# Patient Record
Sex: MALE | Race: WHITE | Employment: OTHER | URBAN - METROPOLITAN AREA
[De-identification: names, ages, dates, MRNs, and addresses within clinical notes are randomized per-mention and may not be internally consistent; named-entity substitution may affect disease eponyms.]

---

## 2018-04-07 ENCOUNTER — APPOINTMENT (OUTPATIENT)
Dept: GENERAL RADIOLOGY | Age: 53
DRG: 003 | End: 2018-04-07
Payer: MEDICARE

## 2018-04-07 ENCOUNTER — APPOINTMENT (OUTPATIENT)
Dept: CT IMAGING | Age: 53
DRG: 003 | End: 2018-04-07
Payer: MEDICARE

## 2018-04-07 ENCOUNTER — ANESTHESIA (OUTPATIENT)
Dept: OPERATING ROOM | Age: 53
DRG: 003 | End: 2018-04-07
Payer: MEDICARE

## 2018-04-07 ENCOUNTER — ANESTHESIA EVENT (OUTPATIENT)
Dept: OPERATING ROOM | Age: 53
DRG: 003 | End: 2018-04-07
Payer: MEDICARE

## 2018-04-07 ENCOUNTER — HOSPITAL ENCOUNTER (INPATIENT)
Age: 53
LOS: 20 days | Discharge: NURSING, REHAB OR LONG-TERM CARE | DRG: 003 | End: 2018-04-27
Attending: EMERGENCY MEDICINE | Admitting: SURGERY
Payer: MEDICARE

## 2018-04-07 VITALS
OXYGEN SATURATION: 93 % | SYSTOLIC BLOOD PRESSURE: 95 MMHG | DIASTOLIC BLOOD PRESSURE: 56 MMHG | RESPIRATION RATE: 8 BRPM | TEMPERATURE: 97 F

## 2018-04-07 DIAGNOSIS — R52 PAIN: ICD-10-CM
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
## 2018-04-07 DIAGNOSIS — F10.921 ALCOHOL INTOXICATION WITH DELIRIUM (HCC): ICD-10-CM

## 2018-04-07 DIAGNOSIS — S82.102A CLOSED FRACTURE OF PROXIMAL END OF LEFT TIBIA, UNSPECIFIED FRACTURE MORPHOLOGY, INITIAL ENCOUNTER: ICD-10-CM

## 2018-04-07 DIAGNOSIS — T14.8XXA FRACTURE: ICD-10-CM

## 2018-04-07 DIAGNOSIS — J96.00 ACUTE RESPIRATORY FAILURE, UNSPECIFIED WHETHER WITH HYPOXIA OR HYPERCAPNIA (HCC): ICD-10-CM

## 2018-04-07 DIAGNOSIS — T14.90XA TRAUMA: Primary | ICD-10-CM

## 2018-04-07 LAB
% INHIBITION AA: 95.4 %
% INHIBITION ADP: 64.6 %
ABO/RH: NORMAL
ACETAMINOPHEN LEVEL: <15 MCG/ML (ref 10–30)
ALBUMIN SERPL-MCNC: 4.2 G/DL (ref 3.5–5.2)
ALP BLD-CCNC: 89 U/L (ref 40–129)
ALT SERPL-CCNC: 61 U/L (ref 0–40)
ANGLE (CLOT STRENGTH): 68.7 DEGREE (ref 59–74)
ANION GAP SERPL CALCULATED.3IONS-SCNC: 14 MMOL/L (ref 7–16)
ANTIBODY SCREEN: NORMAL
APTT: 24.3 SEC (ref 24.5–35.1)
AST SERPL-CCNC: 43 U/L (ref 0–39)
B.E.: -3.2 MMOL/L (ref -3–3)
BILIRUB SERPL-MCNC: 0.2 MG/DL (ref 0–1.2)
BUN BLDV-MCNC: 11 MG/DL (ref 6–20)
CALCIUM SERPL-MCNC: 9 MG/DL (ref 8.6–10.2)
CHLORIDE BLD-SCNC: 100 MMOL/L (ref 98–107)
CO2: 24 MMOL/L (ref 22–29)
COHB: 8.1 % (ref 0–1.5)
CREAT SERPL-MCNC: 0.9 MG/DL (ref 0.7–1.2)
CRITICAL: ABNORMAL
DATE ANALYZED: ABNORMAL
DATE OF COLLECTION: ABNORMAL
EPL-TEG: 15.4 % (ref 0–15)
ETHANOL: 261 MG/DL (ref 0–0.08)
G-TEG: 5.9 K D/SC (ref 4.5–11)
GFR AFRICAN AMERICAN: >60
GFR NON-AFRICAN AMERICAN: >60 ML/MIN/1.73
GLUCOSE BLD-MCNC: 109 MG/DL (ref 74–109)
HCO3: 21.5 MMOL/L (ref 22–26)
HCT VFR BLD CALC: 39.7 % (ref 37–54)
HEMOGLOBIN: 13.3 G/DL (ref 12.5–16.5)
HHB: 0.6 % (ref 0–5)
INR BLD: 1.1
K (CLOTTING TIME): 1.6 MIN (ref 1–3)
LAB: ABNORMAL
LACTIC ACID: 1.9 MMOL/L (ref 0.5–2.2)
LY30 (FIBRINOLYSIS): 15.4 % (ref 0–8)
Lab: 1910
MA (MAX AMPLITUDE): 54.3 MM (ref 50–70)
MA-AA: 8.8 MM
MA-ACTIVATED: 6.6 MM
MA-ADP: 23.5 MM
MA-TEG BASELINE: 54.3 MM
MCH RBC QN AUTO: 30.9 PG (ref 26–35)
MCHC RBC AUTO-ENTMCNC: 33.5 % (ref 32–34.5)
MCV RBC AUTO: 92.3 FL (ref 80–99.9)
METHB: 0.3 % (ref 0–1.5)
MODE: ABNORMAL
O2 CONTENT: 19 ML/DL
O2 SATURATION: 99.3 % (ref 92–98.5)
O2HB: 91 % (ref 94–97)
OPERATOR ID: 5721
PATIENT TEMP: 37 C
PCO2: 37.4 MMHG (ref 35–45)
PDW BLD-RTO: 13.8 FL (ref 11.5–15)
PH BLOOD GAS: 7.38 (ref 7.35–7.45)
PLATELET # BLD: 275 E9/L (ref 130–450)
PMV BLD AUTO: 10 FL (ref 7–12)
PO2: 277.9 MMHG (ref 60–100)
POTASSIUM SERPL-SCNC: 4.12 MMOL/L (ref 3.3–5.1)
POTASSIUM SERPL-SCNC: 4.5 MMOL/L (ref 3.5–5)
PROTHROMBIN TIME: 11.4 SEC (ref 9.3–12.4)
R (REACTION TIME): 3.3 MIN (ref 5–10)
RBC # BLD: 4.3 E12/L (ref 3.8–5.8)
SALICYLATE, SERUM: 1.9 MG/DL (ref 0–30)
SODIUM BLD-SCNC: 138 MMOL/L (ref 132–146)
SOURCE, BLOOD GAS: ABNORMAL
THB: 14.3 G/DL (ref 11.5–16.5)
TIME ANALYZED: 1912
TOTAL PROTEIN: 6.7 G/DL (ref 6.4–8.3)
TRICYCLIC ANTIDEPRESSANTS SCREEN SERUM: NEGATIVE NG/ML
WBC # BLD: 10.8 E9/L (ref 4.5–11.5)

## 2018-04-07 PROCEDURE — 86850 RBC ANTIBODY SCREEN: CPT

## 2018-04-07 PROCEDURE — 73560 X-RAY EXAM OF KNEE 1 OR 2: CPT

## 2018-04-07 PROCEDURE — G0390 TRAUMA RESPONS W/HOSP CRITI: HCPCS

## 2018-04-07 PROCEDURE — 99222 1ST HOSP IP/OBS MODERATE 55: CPT | Performed by: ORTHOPAEDIC SURGERY

## 2018-04-07 PROCEDURE — 36415 COLL VENOUS BLD VENIPUNCTURE: CPT

## 2018-04-07 PROCEDURE — 77071 MNL APPL STRS JT RADIOGRAPHY: CPT | Performed by: ORTHOPAEDIC SURGERY

## 2018-04-07 PROCEDURE — 83605 ASSAY OF LACTIC ACID: CPT

## 2018-04-07 PROCEDURE — 85610 PROTHROMBIN TIME: CPT

## 2018-04-07 PROCEDURE — 86900 BLOOD TYPING SEROLOGIC ABO: CPT

## 2018-04-07 PROCEDURE — 3700000000 HC ANESTHESIA ATTENDED CARE: Performed by: ORTHOPAEDIC SURGERY

## 2018-04-07 PROCEDURE — 6360000002 HC RX W HCPCS: Performed by: EMERGENCY MEDICINE

## 2018-04-07 PROCEDURE — 0QSKXZZ REPOSITION LEFT FIBULA, EXTERNAL APPROACH: ICD-10-PCS | Performed by: ORTHOPAEDIC SURGERY

## 2018-04-07 PROCEDURE — 2580000003 HC RX 258: Performed by: STUDENT IN AN ORGANIZED HEALTH CARE EDUCATION/TRAINING PROGRAM

## 2018-04-07 PROCEDURE — 99285 EMERGENCY DEPT VISIT HI MDM: CPT

## 2018-04-07 PROCEDURE — 74177 CT ABD & PELVIS W/CONTRAST: CPT

## 2018-04-07 PROCEDURE — 80307 DRUG TEST PRSMV CHEM ANLYZR: CPT

## 2018-04-07 PROCEDURE — 6360000002 HC RX W HCPCS: Performed by: STUDENT IN AN ORGANIZED HEALTH CARE EDUCATION/TRAINING PROGRAM

## 2018-04-07 PROCEDURE — 71045 X-RAY EXAM CHEST 1 VIEW: CPT

## 2018-04-07 PROCEDURE — 3700000001 HC ADD 15 MINUTES (ANESTHESIA): Performed by: ORTHOPAEDIC SURGERY

## 2018-04-07 PROCEDURE — 73551 X-RAY EXAM OF FEMUR 1: CPT

## 2018-04-07 PROCEDURE — 90471 IMMUNIZATION ADMIN: CPT | Performed by: STUDENT IN AN ORGANIZED HEALTH CARE EDUCATION/TRAINING PROGRAM

## 2018-04-07 PROCEDURE — 7100000001 HC PACU RECOVERY - ADDTL 15 MIN: Performed by: ORTHOPAEDIC SURGERY

## 2018-04-07 PROCEDURE — 99223 1ST HOSP IP/OBS HIGH 75: CPT | Performed by: SURGERY

## 2018-04-07 PROCEDURE — 90715 TDAP VACCINE 7 YRS/> IM: CPT | Performed by: STUDENT IN AN ORGANIZED HEALTH CARE EDUCATION/TRAINING PROGRAM

## 2018-04-07 PROCEDURE — 96374 THER/PROPH/DIAG INJ IV PUSH: CPT

## 2018-04-07 PROCEDURE — 85384 FIBRINOGEN ACTIVITY: CPT

## 2018-04-07 PROCEDURE — 6360000002 HC RX W HCPCS

## 2018-04-07 PROCEDURE — 72125 CT NECK SPINE W/O DYE: CPT

## 2018-04-07 PROCEDURE — 2500000003 HC RX 250 WO HCPCS

## 2018-04-07 PROCEDURE — 3600000013 HC SURGERY LEVEL 3 ADDTL 15MIN: Performed by: ORTHOPAEDIC SURGERY

## 2018-04-07 PROCEDURE — 73000 X-RAY EXAM OF COLLAR BONE: CPT

## 2018-04-07 PROCEDURE — 7100000000 HC PACU RECOVERY - FIRST 15 MIN: Performed by: ORTHOPAEDIC SURGERY

## 2018-04-07 PROCEDURE — 2720000010 HC SURG SUPPLY STERILE: Performed by: ORTHOPAEDIC SURGERY

## 2018-04-07 PROCEDURE — 1200000000 HC SEMI PRIVATE

## 2018-04-07 PROCEDURE — 6360000002 HC RX W HCPCS: Performed by: ANESTHESIOLOGY

## 2018-04-07 PROCEDURE — 72170 X-RAY EXAM OF PELVIS: CPT

## 2018-04-07 PROCEDURE — 0QHH35Z INSERTION OF EXTERNAL FIXATION DEVICE INTO LEFT TIBIA, PERCUTANEOUS APPROACH: ICD-10-PCS | Performed by: ORTHOPAEDIC SURGERY

## 2018-04-07 PROCEDURE — 86901 BLOOD TYPING SEROLOGIC RH(D): CPT

## 2018-04-07 PROCEDURE — 70450 CT HEAD/BRAIN W/O DYE: CPT

## 2018-04-07 PROCEDURE — 85730 THROMBOPLASTIN TIME PARTIAL: CPT

## 2018-04-07 PROCEDURE — 36410 VNPNXR 3YR/> PHY/QHP DX/THER: CPT | Performed by: SURGERY

## 2018-04-07 PROCEDURE — 36600 WITHDRAWAL OF ARTERIAL BLOOD: CPT | Performed by: SURGERY

## 2018-04-07 PROCEDURE — 85576 BLOOD PLATELET AGGREGATION: CPT

## 2018-04-07 PROCEDURE — 76000 FLUOROSCOPY <1 HR PHYS/QHP: CPT

## 2018-04-07 PROCEDURE — 71260 CT THORAX DX C+: CPT

## 2018-04-07 PROCEDURE — 84132 ASSAY OF SERUM POTASSIUM: CPT

## 2018-04-07 PROCEDURE — 27780 TREATMENT OF FIBULA FRACTURE: CPT | Performed by: ORTHOPAEDIC SURGERY

## 2018-04-07 PROCEDURE — 2709999900 HC NON-CHARGEABLE SUPPLY: Performed by: ORTHOPAEDIC SURGERY

## 2018-04-07 PROCEDURE — 3600000003 HC SURGERY LEVEL 3 BASE: Performed by: ORTHOPAEDIC SURGERY

## 2018-04-07 PROCEDURE — 82805 BLOOD GASES W/O2 SATURATION: CPT

## 2018-04-07 PROCEDURE — 73590 X-RAY EXAM OF LOWER LEG: CPT

## 2018-04-07 PROCEDURE — 85027 COMPLETE CBC AUTOMATED: CPT

## 2018-04-07 PROCEDURE — 85347 COAGULATION TIME ACTIVATED: CPT

## 2018-04-07 PROCEDURE — C1713 ANCHOR/SCREW BN/BN,TIS/BN: HCPCS | Performed by: ORTHOPAEDIC SURGERY

## 2018-04-07 PROCEDURE — 0QSHXZZ REPOSITION LEFT TIBIA, EXTERNAL APPROACH: ICD-10-PCS | Performed by: ORTHOPAEDIC SURGERY

## 2018-04-07 PROCEDURE — 20690 APPL UNIPLN UNI EXT FIXJ SYS: CPT | Performed by: ORTHOPAEDIC SURGERY

## 2018-04-07 PROCEDURE — 94761 N-INVAS EAR/PLS OXIMETRY MLT: CPT

## 2018-04-07 PROCEDURE — 6360000004 HC RX CONTRAST MEDICATION: Performed by: RADIOLOGY

## 2018-04-07 PROCEDURE — 80053 COMPREHEN METABOLIC PANEL: CPT

## 2018-04-07 DEVICE — SCREW EXT FIX L175MM DIA5MM THRD L60MM S STL SELF DRL SCHNZ: Type: IMPLANTABLE DEVICE | Site: LEG | Status: FUNCTIONAL

## 2018-04-07 DEVICE — SCREW EXT FIX L150MM DIA5MM THRD L150MM S STL SELF DRL MR: Type: IMPLANTABLE DEVICE | Site: LEG | Status: FUNCTIONAL

## 2018-04-07 RX ORDER — GLYCOPYRROLATE 1 MG/5 ML
SYRINGE (ML) INTRAVENOUS PRN
Status: DISCONTINUED | OUTPATIENT
Start: 2018-04-07 | End: 2018-04-07 | Stop reason: SDUPTHER

## 2018-04-07 RX ORDER — ONDANSETRON 2 MG/ML
4 INJECTION INTRAMUSCULAR; INTRAVENOUS
Status: ACTIVE | OUTPATIENT
Start: 2018-04-07 | End: 2018-04-07

## 2018-04-07 RX ORDER — MIDAZOLAM HYDROCHLORIDE 1 MG/ML
INJECTION INTRAMUSCULAR; INTRAVENOUS
Status: COMPLETED
Start: 2018-04-07 | End: 2018-04-07

## 2018-04-07 RX ORDER — DEXAMETHASONE SODIUM PHOSPHATE 10 MG/ML
INJECTION, SOLUTION INTRAMUSCULAR; INTRAVENOUS PRN
Status: DISCONTINUED | OUTPATIENT
Start: 2018-04-07 | End: 2018-04-07 | Stop reason: SDUPTHER

## 2018-04-07 RX ORDER — FENTANYL CITRATE 50 UG/ML
25 INJECTION, SOLUTION INTRAMUSCULAR; INTRAVENOUS EVERY 5 MIN PRN
Status: DISCONTINUED | OUTPATIENT
Start: 2018-04-07 | End: 2018-04-08

## 2018-04-07 RX ORDER — SODIUM CHLORIDE 9 MG/ML
INJECTION, SOLUTION INTRAVENOUS CONTINUOUS
Status: DISCONTINUED | OUTPATIENT
Start: 2018-04-07 | End: 2018-04-08

## 2018-04-07 RX ORDER — FENTANYL CITRATE 50 UG/ML
INJECTION, SOLUTION INTRAMUSCULAR; INTRAVENOUS
Status: COMPLETED
Start: 2018-04-07 | End: 2018-04-07

## 2018-04-07 RX ORDER — DIPHENHYDRAMINE HYDROCHLORIDE 50 MG/ML
12.5 INJECTION INTRAMUSCULAR; INTRAVENOUS
Status: ACTIVE | OUTPATIENT
Start: 2018-04-07 | End: 2018-04-07

## 2018-04-07 RX ORDER — HYDROMORPHONE HCL 110MG/55ML
0.25 PATIENT CONTROLLED ANALGESIA SYRINGE INTRAVENOUS EVERY 5 MIN PRN
Status: DISCONTINUED | OUTPATIENT
Start: 2018-04-07 | End: 2018-04-08

## 2018-04-07 RX ORDER — MEPERIDINE HYDROCHLORIDE 50 MG/ML
12.5 INJECTION INTRAMUSCULAR; INTRAVENOUS; SUBCUTANEOUS EVERY 5 MIN PRN
Status: DISCONTINUED | OUTPATIENT
Start: 2018-04-07 | End: 2018-04-08

## 2018-04-07 RX ORDER — FENTANYL CITRATE 50 UG/ML
50 INJECTION, SOLUTION INTRAMUSCULAR; INTRAVENOUS EVERY 5 MIN PRN
Status: DISCONTINUED | OUTPATIENT
Start: 2018-04-07 | End: 2018-04-12

## 2018-04-07 RX ORDER — PROPOFOL 10 MG/ML
INJECTION, EMULSION INTRAVENOUS PRN
Status: DISCONTINUED | OUTPATIENT
Start: 2018-04-07 | End: 2018-04-07 | Stop reason: SDUPTHER

## 2018-04-07 RX ORDER — FENTANYL CITRATE 50 UG/ML
INJECTION, SOLUTION INTRAMUSCULAR; INTRAVENOUS PRN
Status: DISCONTINUED | OUTPATIENT
Start: 2018-04-07 | End: 2018-04-07 | Stop reason: SDUPTHER

## 2018-04-07 RX ORDER — LABETALOL HYDROCHLORIDE 5 MG/ML
5 INJECTION, SOLUTION INTRAVENOUS EVERY 10 MIN PRN
Status: DISCONTINUED | OUTPATIENT
Start: 2018-04-07 | End: 2018-04-08

## 2018-04-07 RX ORDER — SUCCINYLCHOLINE CHLORIDE 20 MG/ML
INJECTION INTRAMUSCULAR; INTRAVENOUS PRN
Status: DISCONTINUED | OUTPATIENT
Start: 2018-04-07 | End: 2018-04-07 | Stop reason: SDUPTHER

## 2018-04-07 RX ORDER — MORPHINE SULFATE 2 MG/ML
2 INJECTION, SOLUTION INTRAMUSCULAR; INTRAVENOUS
Status: DISCONTINUED | OUTPATIENT
Start: 2018-04-07 | End: 2018-04-08

## 2018-04-07 RX ORDER — ROCURONIUM BROMIDE 10 MG/ML
INJECTION, SOLUTION INTRAVENOUS PRN
Status: DISCONTINUED | OUTPATIENT
Start: 2018-04-07 | End: 2018-04-07 | Stop reason: SDUPTHER

## 2018-04-07 RX ORDER — MIDAZOLAM HYDROCHLORIDE 1 MG/ML
INJECTION INTRAMUSCULAR; INTRAVENOUS PRN
Status: DISCONTINUED | OUTPATIENT
Start: 2018-04-07 | End: 2018-04-07 | Stop reason: SDUPTHER

## 2018-04-07 RX ORDER — OXYCODONE HYDROCHLORIDE 5 MG/1
5 TABLET ORAL EVERY 6 HOURS PRN
Qty: 28 TABLET | Refills: 0 | Status: SHIPPED | OUTPATIENT
Start: 2018-04-07 | End: 2018-04-14

## 2018-04-07 RX ORDER — NEOSTIGMINE METHYLSULFATE 1 MG/ML
INJECTION, SOLUTION INTRAVENOUS PRN
Status: DISCONTINUED | OUTPATIENT
Start: 2018-04-07 | End: 2018-04-07 | Stop reason: SDUPTHER

## 2018-04-07 RX ORDER — FENTANYL CITRATE 50 UG/ML
INJECTION, SOLUTION INTRAMUSCULAR; INTRAVENOUS DAILY PRN
Status: COMPLETED | OUTPATIENT
Start: 2018-04-07 | End: 2018-04-07

## 2018-04-07 RX ORDER — ONDANSETRON 2 MG/ML
4 INJECTION INTRAMUSCULAR; INTRAVENOUS EVERY 6 HOURS PRN
Status: CANCELLED | OUTPATIENT
Start: 2018-04-07

## 2018-04-07 RX ORDER — ONDANSETRON 2 MG/ML
INJECTION INTRAMUSCULAR; INTRAVENOUS PRN
Status: DISCONTINUED | OUTPATIENT
Start: 2018-04-07 | End: 2018-04-07 | Stop reason: SDUPTHER

## 2018-04-07 RX ADMIN — ROCURONIUM BROMIDE 10 MG: 10 INJECTION, SOLUTION INTRAVENOUS at 21:36

## 2018-04-07 RX ADMIN — PHENYLEPHRINE HYDROCHLORIDE 100 MCG: 10 INJECTION INTRAVENOUS at 21:41

## 2018-04-07 RX ADMIN — PROPOFOL 160 MG: 10 INJECTION, EMULSION INTRAVENOUS at 21:36

## 2018-04-07 RX ADMIN — FENTANYL CITRATE 50 MCG: 50 INJECTION, SOLUTION INTRAMUSCULAR; INTRAVENOUS at 22:56

## 2018-04-07 RX ADMIN — FENTANYL CITRATE 100 MCG: 50 INJECTION, SOLUTION INTRAMUSCULAR; INTRAVENOUS at 19:13

## 2018-04-07 RX ADMIN — LIDOCAINE HYDROCHLORIDE 50 MG: 20 INJECTION, SOLUTION INTRAVENOUS at 21:36

## 2018-04-07 RX ADMIN — Medication 3 MG: at 22:02

## 2018-04-07 RX ADMIN — ONDANSETRON 4 MG: 2 INJECTION INTRAMUSCULAR; INTRAVENOUS at 21:36

## 2018-04-07 RX ADMIN — DEXAMETHASONE SODIUM PHOSPHATE 10 MG: 10 INJECTION, SOLUTION INTRAMUSCULAR; INTRAVENOUS at 21:36

## 2018-04-07 RX ADMIN — PHENYLEPHRINE HYDROCHLORIDE 200 MCG: 10 INJECTION INTRAVENOUS at 21:45

## 2018-04-07 RX ADMIN — PHENYLEPHRINE HYDROCHLORIDE 100 MCG: 10 INJECTION INTRAVENOUS at 21:47

## 2018-04-07 RX ADMIN — MIDAZOLAM 2 MG: 1 INJECTION INTRAMUSCULAR; INTRAVENOUS at 21:36

## 2018-04-07 RX ADMIN — SODIUM CHLORIDE: 9 INJECTION, SOLUTION INTRAVENOUS at 20:17

## 2018-04-07 RX ADMIN — MIDAZOLAM 2 MG: 1 INJECTION INTRAMUSCULAR; INTRAVENOUS at 22:29

## 2018-04-07 RX ADMIN — PHENYLEPHRINE HYDROCHLORIDE 100 MCG: 10 INJECTION INTRAVENOUS at 21:55

## 2018-04-07 RX ADMIN — SODIUM CHLORIDE: 9 INJECTION, SOLUTION INTRAVENOUS at 21:32

## 2018-04-07 RX ADMIN — PROPOFOL 40 MG: 10 INJECTION, EMULSION INTRAVENOUS at 21:51

## 2018-04-07 RX ADMIN — TETANUS TOXOID, REDUCED DIPHTHERIA TOXOID AND ACELLULAR PERTUSSIS VACCINE, ADSORBED 0.5 ML: 5; 2.5; 8; 8; 2.5 SUSPENSION INTRAMUSCULAR at 20:19

## 2018-04-07 RX ADMIN — PHENYLEPHRINE HYDROCHLORIDE 100 MCG: 10 INJECTION INTRAVENOUS at 22:03

## 2018-04-07 RX ADMIN — Medication 120 MG: at 21:36

## 2018-04-07 RX ADMIN — PHENYLEPHRINE HYDROCHLORIDE 200 MCG: 10 INJECTION INTRAVENOUS at 21:38

## 2018-04-07 RX ADMIN — PHENYLEPHRINE HYDROCHLORIDE 100 MCG: 10 INJECTION INTRAVENOUS at 21:53

## 2018-04-07 RX ADMIN — ROCURONIUM BROMIDE 10 MG: 10 INJECTION, SOLUTION INTRAVENOUS at 21:51

## 2018-04-07 RX ADMIN — Medication 0.6 MG: at 22:02

## 2018-04-07 RX ADMIN — IOPAMIDOL 110 ML: 755 INJECTION, SOLUTION INTRAVENOUS at 19:47

## 2018-04-07 RX ADMIN — FENTANYL CITRATE 50 MCG: 50 INJECTION, SOLUTION INTRAMUSCULAR; INTRAVENOUS at 22:51

## 2018-04-07 RX ADMIN — FENTANYL CITRATE 100 MCG: 50 INJECTION, SOLUTION INTRAMUSCULAR; INTRAVENOUS at 21:36

## 2018-04-07 ASSESSMENT — PULMONARY FUNCTION TESTS
PIF_VALUE: 14
PIF_VALUE: 18
PIF_VALUE: 4
PIF_VALUE: 23
PIF_VALUE: 23
PIF_VALUE: 17
PIF_VALUE: 24
PIF_VALUE: 3
PIF_VALUE: 22
PIF_VALUE: 41
PIF_VALUE: 25
PIF_VALUE: 23
PIF_VALUE: 3
PIF_VALUE: 2
PIF_VALUE: 5
PIF_VALUE: 27
PIF_VALUE: 16
PIF_VALUE: 24
PIF_VALUE: 25
PIF_VALUE: 3
PIF_VALUE: 23
PIF_VALUE: 0
PIF_VALUE: 14
PIF_VALUE: 25
PIF_VALUE: 17
PIF_VALUE: 3
PIF_VALUE: 23
PIF_VALUE: 24
PIF_VALUE: 13
PIF_VALUE: 3
PIF_VALUE: 1
PIF_VALUE: 19
PIF_VALUE: 24
PIF_VALUE: 0
PIF_VALUE: 23
PIF_VALUE: 1
PIF_VALUE: 25
PIF_VALUE: 26
PIF_VALUE: 17
PIF_VALUE: 0
PIF_VALUE: 23
PIF_VALUE: 4
PIF_VALUE: 25
PIF_VALUE: 20
PIF_VALUE: 21
PIF_VALUE: 21
PIF_VALUE: 2
PIF_VALUE: 0

## 2018-04-07 ASSESSMENT — PAIN DESCRIPTION - LOCATION
LOCATION: LEG
LOCATION: LEG;SHOULDER

## 2018-04-07 ASSESSMENT — PAIN DESCRIPTION - DESCRIPTORS: DESCRIPTORS: ACHING;DISCOMFORT;SORE

## 2018-04-07 ASSESSMENT — PAIN SCALES - GENERAL
PAINLEVEL_OUTOF10: 10
PAINLEVEL_OUTOF10: 6
PAINLEVEL_OUTOF10: 0
PAINLEVEL_OUTOF10: 10
PAINLEVEL_OUTOF10: 7

## 2018-04-07 ASSESSMENT — PAIN DESCRIPTION - PAIN TYPE
TYPE: ACUTE PAIN
TYPE: SURGICAL PAIN

## 2018-04-07 ASSESSMENT — PAIN DESCRIPTION - FREQUENCY: FREQUENCY: CONTINUOUS

## 2018-04-07 ASSESSMENT — PAIN DESCRIPTION - ORIENTATION
ORIENTATION: LEFT

## 2018-04-07 ASSESSMENT — PAIN DESCRIPTION - ONSET: ONSET: ON-GOING

## 2018-04-08 ENCOUNTER — APPOINTMENT (OUTPATIENT)
Dept: GENERAL RADIOLOGY | Age: 53
DRG: 003 | End: 2018-04-08
Payer: MEDICARE

## 2018-04-08 ENCOUNTER — APPOINTMENT (OUTPATIENT)
Dept: CT IMAGING | Age: 53
DRG: 003 | End: 2018-04-08
Payer: MEDICARE

## 2018-04-08 PROBLEM — F10.929 ALCOHOL INTOXICATION (HCC): Status: ACTIVE | Noted: 2018-04-08

## 2018-04-08 PROBLEM — S02.2XXA NASAL BONE FRACTURE: Status: ACTIVE | Noted: 2018-04-08

## 2018-04-08 PROBLEM — S82.202A TIBIA/FIBULA FRACTURE, LEFT, CLOSED, INITIAL ENCOUNTER: Status: ACTIVE | Noted: 2018-04-08

## 2018-04-08 PROBLEM — S82.402A TIBIA/FIBULA FRACTURE, LEFT, CLOSED, INITIAL ENCOUNTER: Status: ACTIVE | Noted: 2018-04-08

## 2018-04-08 PROBLEM — S42.009A CLAVICLE FRACTURE: Status: ACTIVE | Noted: 2018-04-08

## 2018-04-08 PROBLEM — S92.002A CLOSED FRACTURE OF LEFT CALCANEUS: Status: ACTIVE | Noted: 2018-04-08

## 2018-04-08 LAB
ANION GAP SERPL CALCULATED.3IONS-SCNC: 13 MMOL/L (ref 7–16)
BASOPHILS ABSOLUTE: 0.01 E9/L (ref 0–0.2)
BASOPHILS RELATIVE PERCENT: 0.1 % (ref 0–2)
BUN BLDV-MCNC: 10 MG/DL (ref 6–20)
CALCIUM SERPL-MCNC: 8.8 MG/DL (ref 8.6–10.2)
CHLORIDE BLD-SCNC: 97 MMOL/L (ref 98–107)
CO2: 24 MMOL/L (ref 22–29)
CREAT SERPL-MCNC: 0.7 MG/DL (ref 0.7–1.2)
EOSINOPHILS ABSOLUTE: 0.01 E9/L (ref 0.05–0.5)
EOSINOPHILS RELATIVE PERCENT: 0.1 % (ref 0–6)
GFR AFRICAN AMERICAN: >60
GFR NON-AFRICAN AMERICAN: >60 ML/MIN/1.73
GLUCOSE BLD-MCNC: 272 MG/DL (ref 74–109)
HCT VFR BLD CALC: 34.6 % (ref 37–54)
HEMOGLOBIN: 11.4 G/DL (ref 12.5–16.5)
IMMATURE GRANULOCYTES #: 0.1 E9/L
IMMATURE GRANULOCYTES %: 0.8 % (ref 0–5)
LYMPHOCYTES ABSOLUTE: 0.8 E9/L (ref 1.5–4)
LYMPHOCYTES RELATIVE PERCENT: 6.1 % (ref 20–42)
MCH RBC QN AUTO: 30.6 PG (ref 26–35)
MCHC RBC AUTO-ENTMCNC: 32.9 % (ref 32–34.5)
MCV RBC AUTO: 92.8 FL (ref 80–99.9)
METER GLUCOSE: 212 MG/DL (ref 70–110)
METER GLUCOSE: 241 MG/DL (ref 70–110)
METER GLUCOSE: 245 MG/DL (ref 70–110)
MONOCYTES ABSOLUTE: 0.7 E9/L (ref 0.1–0.95)
MONOCYTES RELATIVE PERCENT: 5.3 % (ref 2–12)
NEUTROPHILS ABSOLUTE: 11.6 E9/L (ref 1.8–7.3)
NEUTROPHILS RELATIVE PERCENT: 87.6 % (ref 43–80)
PDW BLD-RTO: 14.1 FL (ref 11.5–15)
PLATELET # BLD: 259 E9/L (ref 130–450)
PMV BLD AUTO: 10.5 FL (ref 7–12)
POTASSIUM REFLEX MAGNESIUM: 4.8 MMOL/L (ref 3.5–5)
RBC # BLD: 3.73 E12/L (ref 3.8–5.8)
SODIUM BLD-SCNC: 134 MMOL/L (ref 132–146)
WBC # BLD: 13.2 E9/L (ref 4.5–11.5)

## 2018-04-08 PROCEDURE — 36415 COLL VENOUS BLD VENIPUNCTURE: CPT

## 2018-04-08 PROCEDURE — 6360000002 HC RX W HCPCS: Performed by: STUDENT IN AN ORGANIZED HEALTH CARE EDUCATION/TRAINING PROGRAM

## 2018-04-08 PROCEDURE — 6370000000 HC RX 637 (ALT 250 FOR IP): Performed by: STUDENT IN AN ORGANIZED HEALTH CARE EDUCATION/TRAINING PROGRAM

## 2018-04-08 PROCEDURE — 73700 CT LOWER EXTREMITY W/O DYE: CPT

## 2018-04-08 PROCEDURE — 70450 CT HEAD/BRAIN W/O DYE: CPT

## 2018-04-08 PROCEDURE — 73590 X-RAY EXAM OF LOWER LEG: CPT

## 2018-04-08 PROCEDURE — 2580000003 HC RX 258: Performed by: STUDENT IN AN ORGANIZED HEALTH CARE EDUCATION/TRAINING PROGRAM

## 2018-04-08 PROCEDURE — 99232 SBSQ HOSP IP/OBS MODERATE 35: CPT | Performed by: SURGERY

## 2018-04-08 PROCEDURE — 6360000002 HC RX W HCPCS

## 2018-04-08 PROCEDURE — 1200000000 HC SEMI PRIVATE

## 2018-04-08 PROCEDURE — 82962 GLUCOSE BLOOD TEST: CPT

## 2018-04-08 PROCEDURE — 73610 X-RAY EXAM OF ANKLE: CPT

## 2018-04-08 PROCEDURE — 80048 BASIC METABOLIC PNL TOTAL CA: CPT

## 2018-04-08 PROCEDURE — 85025 COMPLETE CBC W/AUTO DIFF WBC: CPT

## 2018-04-08 RX ORDER — MORPHINE SULFATE 4 MG/ML
4 INJECTION, SOLUTION INTRAMUSCULAR; INTRAVENOUS EVERY 4 HOURS PRN
Status: DISCONTINUED | OUTPATIENT
Start: 2018-04-08 | End: 2018-04-09

## 2018-04-08 RX ORDER — DOCUSATE SODIUM 100 MG/1
100 CAPSULE, LIQUID FILLED ORAL 2 TIMES DAILY
Status: DISCONTINUED | OUTPATIENT
Start: 2018-04-08 | End: 2018-04-13

## 2018-04-08 RX ORDER — POLYETHYLENE GLYCOL 3350 17 G/17G
0.4 POWDER, FOR SOLUTION ORAL ONCE
Status: COMPLETED | OUTPATIENT
Start: 2018-04-08 | End: 2018-04-08

## 2018-04-08 RX ORDER — NICOTINE 21 MG/24HR
1 PATCH, TRANSDERMAL 24 HOURS TRANSDERMAL DAILY
Status: DISCONTINUED | OUTPATIENT
Start: 2018-04-08 | End: 2018-04-16 | Stop reason: SDUPTHER

## 2018-04-08 RX ORDER — OXYCODONE HYDROCHLORIDE AND ACETAMINOPHEN 5; 325 MG/1; MG/1
1 TABLET ORAL EVERY 4 HOURS PRN
Status: DISCONTINUED | OUTPATIENT
Start: 2018-04-08 | End: 2018-04-08 | Stop reason: SDUPTHER

## 2018-04-08 RX ORDER — OXYCODONE HYDROCHLORIDE 10 MG/1
10 TABLET ORAL EVERY 4 HOURS PRN
Status: DISCONTINUED | OUTPATIENT
Start: 2018-04-08 | End: 2018-04-10

## 2018-04-08 RX ORDER — SODIUM CHLORIDE 0.9 % (FLUSH) 0.9 %
10 SYRINGE (ML) INJECTION PRN
Status: DISCONTINUED | OUTPATIENT
Start: 2018-04-08 | End: 2018-04-08 | Stop reason: SDUPTHER

## 2018-04-08 RX ORDER — FOLIC ACID 1 MG/1
1 TABLET ORAL DAILY
Status: COMPLETED | OUTPATIENT
Start: 2018-04-08 | End: 2018-04-11

## 2018-04-08 RX ORDER — NICOTINE POLACRILEX 4 MG
15 LOZENGE BUCCAL PRN
Status: DISCONTINUED | OUTPATIENT
Start: 2018-04-08 | End: 2018-04-12 | Stop reason: SDUPTHER

## 2018-04-08 RX ORDER — ONDANSETRON 2 MG/ML
4 INJECTION INTRAMUSCULAR; INTRAVENOUS EVERY 6 HOURS PRN
Status: DISCONTINUED | OUTPATIENT
Start: 2018-04-08 | End: 2018-04-24 | Stop reason: SDUPTHER

## 2018-04-08 RX ORDER — MORPHINE SULFATE 2 MG/ML
2 INJECTION, SOLUTION INTRAMUSCULAR; INTRAVENOUS EVERY 4 HOURS PRN
Status: DISCONTINUED | OUTPATIENT
Start: 2018-04-08 | End: 2018-04-08 | Stop reason: CLARIF

## 2018-04-08 RX ORDER — ACETAMINOPHEN 325 MG/1
650 TABLET ORAL EVERY 4 HOURS PRN
Status: DISCONTINUED | OUTPATIENT
Start: 2018-04-08 | End: 2018-04-08 | Stop reason: SDUPTHER

## 2018-04-08 RX ORDER — MORPHINE SULFATE 2 MG/ML
2 INJECTION, SOLUTION INTRAMUSCULAR; INTRAVENOUS ONCE
Status: COMPLETED | OUTPATIENT
Start: 2018-04-08 | End: 2018-04-08

## 2018-04-08 RX ORDER — METHOCARBAMOL 500 MG/1
1000 TABLET, FILM COATED ORAL 4 TIMES DAILY
Status: DISCONTINUED | OUTPATIENT
Start: 2018-04-08 | End: 2018-04-27 | Stop reason: HOSPADM

## 2018-04-08 RX ORDER — SODIUM CHLORIDE 0.9 % (FLUSH) 0.9 %
10 SYRINGE (ML) INJECTION EVERY 12 HOURS SCHEDULED
Status: DISCONTINUED | OUTPATIENT
Start: 2018-04-08 | End: 2018-04-08 | Stop reason: SDUPTHER

## 2018-04-08 RX ORDER — MORPHINE SULFATE 4 MG/ML
4 INJECTION, SOLUTION INTRAMUSCULAR; INTRAVENOUS EVERY 4 HOURS PRN
Status: DISCONTINUED | OUTPATIENT
Start: 2018-04-08 | End: 2018-04-08 | Stop reason: CLARIF

## 2018-04-08 RX ORDER — DEXTROSE MONOHYDRATE 25 G/50ML
12.5 INJECTION, SOLUTION INTRAVENOUS PRN
Status: DISCONTINUED | OUTPATIENT
Start: 2018-04-08 | End: 2018-04-12 | Stop reason: SDUPTHER

## 2018-04-08 RX ORDER — MORPHINE SULFATE 4 MG/ML
INJECTION, SOLUTION INTRAMUSCULAR; INTRAVENOUS
Status: COMPLETED
Start: 2018-04-08 | End: 2018-04-08

## 2018-04-08 RX ORDER — M-VIT,TX,IRON,MINS/CALC/FOLIC 27MG-0.4MG
1 TABLET ORAL DAILY
Status: DISCONTINUED | OUTPATIENT
Start: 2018-04-08 | End: 2018-04-18

## 2018-04-08 RX ORDER — THIAMINE MONONITRATE (VIT B1) 100 MG
100 TABLET ORAL DAILY
Status: COMPLETED | OUTPATIENT
Start: 2018-04-08 | End: 2018-04-11

## 2018-04-08 RX ORDER — MORPHINE SULFATE 2 MG/ML
2 INJECTION, SOLUTION INTRAMUSCULAR; INTRAVENOUS
Status: DISCONTINUED | OUTPATIENT
Start: 2018-04-08 | End: 2018-04-08

## 2018-04-08 RX ORDER — ACETAMINOPHEN 325 MG/1
650 TABLET ORAL EVERY 6 HOURS PRN
Status: DISCONTINUED | OUTPATIENT
Start: 2018-04-08 | End: 2018-04-09

## 2018-04-08 RX ORDER — MORPHINE SULFATE 2 MG/ML
2 INJECTION, SOLUTION INTRAMUSCULAR; INTRAVENOUS EVERY 4 HOURS PRN
Status: DISCONTINUED | OUTPATIENT
Start: 2018-04-08 | End: 2018-04-09

## 2018-04-08 RX ORDER — DEXTROSE MONOHYDRATE 50 MG/ML
100 INJECTION, SOLUTION INTRAVENOUS PRN
Status: DISCONTINUED | OUTPATIENT
Start: 2018-04-08 | End: 2018-04-12 | Stop reason: SDUPTHER

## 2018-04-08 RX ORDER — SODIUM CHLORIDE 0.9 % (FLUSH) 0.9 %
10 SYRINGE (ML) INJECTION PRN
Status: DISCONTINUED | OUTPATIENT
Start: 2018-04-08 | End: 2018-04-16

## 2018-04-08 RX ORDER — OXYCODONE HYDROCHLORIDE 5 MG/1
5 TABLET ORAL EVERY 4 HOURS PRN
Status: DISCONTINUED | OUTPATIENT
Start: 2018-04-08 | End: 2018-04-12

## 2018-04-08 RX ORDER — SENNA AND DOCUSATE SODIUM 50; 8.6 MG/1; MG/1
1 TABLET, FILM COATED ORAL DAILY
Status: DISCONTINUED | OUTPATIENT
Start: 2018-04-08 | End: 2018-04-27 | Stop reason: HOSPADM

## 2018-04-08 RX ORDER — MORPHINE SULFATE 4 MG/ML
4 INJECTION, SOLUTION INTRAMUSCULAR; INTRAVENOUS
Status: DISCONTINUED | OUTPATIENT
Start: 2018-04-08 | End: 2018-04-08

## 2018-04-08 RX ORDER — SODIUM CHLORIDE 0.9 % (FLUSH) 0.9 %
10 SYRINGE (ML) INJECTION EVERY 12 HOURS SCHEDULED
Status: DISCONTINUED | OUTPATIENT
Start: 2018-04-08 | End: 2018-04-16

## 2018-04-08 RX ORDER — OXYCODONE HYDROCHLORIDE AND ACETAMINOPHEN 5; 325 MG/1; MG/1
2 TABLET ORAL EVERY 4 HOURS PRN
Status: DISCONTINUED | OUTPATIENT
Start: 2018-04-08 | End: 2018-04-08 | Stop reason: SDUPTHER

## 2018-04-08 RX ORDER — POLYETHYLENE GLYCOL 3350 17 G/17G
17 POWDER, FOR SOLUTION ORAL DAILY
Status: DISCONTINUED | OUTPATIENT
Start: 2018-04-09 | End: 2018-04-27 | Stop reason: HOSPADM

## 2018-04-08 RX ORDER — BISACODYL 10 MG
10 SUPPOSITORY, RECTAL RECTAL DAILY PRN
Status: DISCONTINUED | OUTPATIENT
Start: 2018-04-08 | End: 2018-04-27 | Stop reason: HOSPADM

## 2018-04-08 RX ORDER — BISACODYL 10 MG
10 SUPPOSITORY, RECTAL RECTAL DAILY
Status: DISCONTINUED | OUTPATIENT
Start: 2018-04-08 | End: 2018-04-21

## 2018-04-08 RX ORDER — SODIUM CHLORIDE 9 MG/ML
INJECTION, SOLUTION INTRAVENOUS CONTINUOUS
Status: DISCONTINUED | OUTPATIENT
Start: 2018-04-08 | End: 2018-04-08

## 2018-04-08 RX ADMIN — OXYCODONE HYDROCHLORIDE 10 MG: 10 TABLET ORAL at 22:36

## 2018-04-08 RX ADMIN — METHOCARBAMOL 1000 MG: 500 TABLET ORAL at 13:42

## 2018-04-08 RX ADMIN — Medication 100 MG: at 09:18

## 2018-04-08 RX ADMIN — CEFAZOLIN SODIUM 2 G: 2 SOLUTION INTRAVENOUS at 13:39

## 2018-04-08 RX ADMIN — Medication 10 ML: at 20:08

## 2018-04-08 RX ADMIN — DOCUSATE SODIUM 100 MG: 100 CAPSULE, LIQUID FILLED ORAL at 09:17

## 2018-04-08 RX ADMIN — METHOCARBAMOL 1000 MG: 500 TABLET ORAL at 16:02

## 2018-04-08 RX ADMIN — METHOCARBAMOL 1000 MG: 500 TABLET ORAL at 10:11

## 2018-04-08 RX ADMIN — MORPHINE SULFATE 2 MG: 2 INJECTION, SOLUTION INTRAMUSCULAR; INTRAVENOUS at 12:26

## 2018-04-08 RX ADMIN — MORPHINE SULFATE 2 MG: 2 INJECTION, SOLUTION INTRAMUSCULAR; INTRAVENOUS at 07:58

## 2018-04-08 RX ADMIN — OXYCODONE HYDROCHLORIDE 10 MG: 10 TABLET ORAL at 10:12

## 2018-04-08 RX ADMIN — MORPHINE SULFATE 2 MG: 2 INJECTION, SOLUTION INTRAMUSCULAR; INTRAVENOUS at 03:52

## 2018-04-08 RX ADMIN — STANDARDIZED SENNA CONCENTRATE AND DOCUSATE SODIUM 1 TABLET: 8.6; 5 TABLET, FILM COATED ORAL at 09:17

## 2018-04-08 RX ADMIN — OXYCODONE HYDROCHLORIDE 10 MG: 10 TABLET ORAL at 18:47

## 2018-04-08 RX ADMIN — INSULIN LISPRO 1 UNITS: 100 INJECTION, SOLUTION INTRAVENOUS; SUBCUTANEOUS at 20:11

## 2018-04-08 RX ADMIN — OXYCODONE HYDROCHLORIDE 10 MG: 10 TABLET ORAL at 01:03

## 2018-04-08 RX ADMIN — INSULIN LISPRO 3 UNITS: 100 INJECTION, SOLUTION INTRAVENOUS; SUBCUTANEOUS at 09:23

## 2018-04-08 RX ADMIN — Medication 10 ML: at 12:26

## 2018-04-08 RX ADMIN — MORPHINE SULFATE 2 MG: 2 INJECTION, SOLUTION INTRAMUSCULAR; INTRAVENOUS at 14:11

## 2018-04-08 RX ADMIN — INSULIN LISPRO 2 UNITS: 100 INJECTION, SOLUTION INTRAVENOUS; SUBCUTANEOUS at 12:41

## 2018-04-08 RX ADMIN — POLYETHYLENE GLYCOL 3350 34 G: 17 POWDER, FOR SOLUTION ORAL at 13:39

## 2018-04-08 RX ADMIN — INSULIN LISPRO 2 UNITS: 100 INJECTION, SOLUTION INTRAVENOUS; SUBCUTANEOUS at 16:00

## 2018-04-08 RX ADMIN — Medication 4 MG: at 23:17

## 2018-04-08 RX ADMIN — MULTIPLE VITAMINS W/ MINERALS TAB 1 TABLET: TAB at 09:18

## 2018-04-08 RX ADMIN — SODIUM CHLORIDE: 9 INJECTION, SOLUTION INTRAVENOUS at 06:33

## 2018-04-08 RX ADMIN — DOCUSATE SODIUM 100 MG: 100 CAPSULE, LIQUID FILLED ORAL at 01:03

## 2018-04-08 RX ADMIN — Medication 1 MG: at 09:18

## 2018-04-08 RX ADMIN — OXYCODONE HYDROCHLORIDE 10 MG: 10 TABLET ORAL at 05:54

## 2018-04-08 RX ADMIN — MORPHINE SULFATE 4 MG: 4 INJECTION, SOLUTION INTRAMUSCULAR; INTRAVENOUS at 15:44

## 2018-04-08 RX ADMIN — MORPHINE SULFATE 4 MG: 4 INJECTION, SOLUTION INTRAMUSCULAR; INTRAVENOUS at 19:05

## 2018-04-08 RX ADMIN — OXYCODONE HYDROCHLORIDE 10 MG: 10 TABLET ORAL at 14:44

## 2018-04-08 RX ADMIN — DOCUSATE SODIUM 100 MG: 100 CAPSULE, LIQUID FILLED ORAL at 20:08

## 2018-04-08 RX ADMIN — METHOCARBAMOL 1000 MG: 500 TABLET ORAL at 20:08

## 2018-04-08 RX ADMIN — CEFAZOLIN SODIUM 2 G: 2 SOLUTION INTRAVENOUS at 06:28

## 2018-04-08 RX ADMIN — ENOXAPARIN SODIUM 30 MG: 30 INJECTION SUBCUTANEOUS at 20:08

## 2018-04-08 ASSESSMENT — PAIN DESCRIPTION - PAIN TYPE
TYPE: SURGICAL PAIN
TYPE: ACUTE PAIN
TYPE: SURGICAL PAIN
TYPE: ACUTE PAIN

## 2018-04-08 ASSESSMENT — PAIN SCALES - GENERAL
PAINLEVEL_OUTOF10: 10
PAINLEVEL_OUTOF10: 6
PAINLEVEL_OUTOF10: 10
PAINLEVEL_OUTOF10: 7
PAINLEVEL_OUTOF10: 8
PAINLEVEL_OUTOF10: 7
PAINLEVEL_OUTOF10: 6
PAINLEVEL_OUTOF10: 6
PAINLEVEL_OUTOF10: 7
PAINLEVEL_OUTOF10: 10
PAINLEVEL_OUTOF10: 9
PAINLEVEL_OUTOF10: 8
PAINLEVEL_OUTOF10: 3
PAINLEVEL_OUTOF10: 10
PAINLEVEL_OUTOF10: 6
PAINLEVEL_OUTOF10: 9
PAINLEVEL_OUTOF10: 8

## 2018-04-08 ASSESSMENT — PAIN DESCRIPTION - LOCATION
LOCATION: ANKLE;FOOT;LEG
LOCATION: ARM;LEG;FOOT
LOCATION: LEG
LOCATION: LEG;FOOT
LOCATION: FOOT;LEG
LOCATION: LEG
LOCATION: ARM;LEG;FOOT
LOCATION: LEG

## 2018-04-08 ASSESSMENT — PAIN DESCRIPTION - ORIENTATION
ORIENTATION: RIGHT;LEFT
ORIENTATION: LEFT
ORIENTATION: RIGHT;LEFT
ORIENTATION: LEFT
ORIENTATION: RIGHT;LEFT
ORIENTATION: LEFT
ORIENTATION: RIGHT;LEFT
ORIENTATION: RIGHT;LEFT

## 2018-04-08 ASSESSMENT — PAIN DESCRIPTION - FREQUENCY
FREQUENCY: CONTINUOUS

## 2018-04-08 ASSESSMENT — PAIN DESCRIPTION - PROGRESSION
CLINICAL_PROGRESSION: NOT CHANGED
CLINICAL_PROGRESSION: GRADUALLY WORSENING
CLINICAL_PROGRESSION: GRADUALLY WORSENING

## 2018-04-08 ASSESSMENT — PAIN DESCRIPTION - ONSET
ONSET: ON-GOING

## 2018-04-08 ASSESSMENT — PAIN DESCRIPTION - DESCRIPTORS
DESCRIPTORS: CONSTANT;STABBING;THROBBING
DESCRIPTORS: CONSTANT;STABBING;THROBBING
DESCRIPTORS: BURNING;STABBING;TIGHTNESS
DESCRIPTORS: POUNDING;SHOOTING
DESCRIPTORS: BURNING;STABBING;TIGHTNESS
DESCRIPTORS: CONSTANT;STABBING;THROBBING
DESCRIPTORS: SHARP;SHOOTING

## 2018-04-09 LAB
BASOPHILS ABSOLUTE: 0.04 E9/L (ref 0–0.2)
BASOPHILS RELATIVE PERCENT: 0.3 % (ref 0–2)
EOSINOPHILS ABSOLUTE: 0.01 E9/L (ref 0.05–0.5)
EOSINOPHILS RELATIVE PERCENT: 0.1 % (ref 0–6)
HCT VFR BLD CALC: 26.9 % (ref 37–54)
HEMOGLOBIN: 9.1 G/DL (ref 12.5–16.5)
IMMATURE GRANULOCYTES #: 0.07 E9/L
IMMATURE GRANULOCYTES %: 0.5 % (ref 0–5)
LYMPHOCYTES ABSOLUTE: 2.76 E9/L (ref 1.5–4)
LYMPHOCYTES RELATIVE PERCENT: 20.7 % (ref 20–42)
MCH RBC QN AUTO: 30.8 PG (ref 26–35)
MCHC RBC AUTO-ENTMCNC: 33.8 % (ref 32–34.5)
MCV RBC AUTO: 91.2 FL (ref 80–99.9)
METER GLUCOSE: 138 MG/DL (ref 70–110)
METER GLUCOSE: 171 MG/DL (ref 70–110)
METER GLUCOSE: 177 MG/DL (ref 70–110)
METER GLUCOSE: 191 MG/DL (ref 70–110)
MONOCYTES ABSOLUTE: 1.28 E9/L (ref 0.1–0.95)
MONOCYTES RELATIVE PERCENT: 9.6 % (ref 2–12)
NEUTROPHILS ABSOLUTE: 9.2 E9/L (ref 1.8–7.3)
NEUTROPHILS RELATIVE PERCENT: 68.8 % (ref 43–80)
PDW BLD-RTO: 14.2 FL (ref 11.5–15)
PLATELET # BLD: 182 E9/L (ref 130–450)
PMV BLD AUTO: 10.5 FL (ref 7–12)
RBC # BLD: 2.95 E12/L (ref 3.8–5.8)
WBC # BLD: 13.4 E9/L (ref 4.5–11.5)

## 2018-04-09 PROCEDURE — 97530 THERAPEUTIC ACTIVITIES: CPT

## 2018-04-09 PROCEDURE — 2580000003 HC RX 258: Performed by: STUDENT IN AN ORGANIZED HEALTH CARE EDUCATION/TRAINING PROGRAM

## 2018-04-09 PROCEDURE — 82962 GLUCOSE BLOOD TEST: CPT

## 2018-04-09 PROCEDURE — 6370000000 HC RX 637 (ALT 250 FOR IP): Performed by: STUDENT IN AN ORGANIZED HEALTH CARE EDUCATION/TRAINING PROGRAM

## 2018-04-09 PROCEDURE — 6360000002 HC RX W HCPCS: Performed by: STUDENT IN AN ORGANIZED HEALTH CARE EDUCATION/TRAINING PROGRAM

## 2018-04-09 PROCEDURE — G8987 SELF CARE CURRENT STATUS: HCPCS

## 2018-04-09 PROCEDURE — 99232 SBSQ HOSP IP/OBS MODERATE 35: CPT | Performed by: SURGERY

## 2018-04-09 PROCEDURE — 1200000000 HC SEMI PRIVATE

## 2018-04-09 PROCEDURE — G8988 SELF CARE GOAL STATUS: HCPCS

## 2018-04-09 PROCEDURE — 85025 COMPLETE CBC W/AUTO DIFF WBC: CPT

## 2018-04-09 PROCEDURE — 97166 OT EVAL MOD COMPLEX 45 MIN: CPT

## 2018-04-09 PROCEDURE — G8979 MOBILITY GOAL STATUS: HCPCS

## 2018-04-09 PROCEDURE — 97162 PT EVAL MOD COMPLEX 30 MIN: CPT

## 2018-04-09 PROCEDURE — 36415 COLL VENOUS BLD VENIPUNCTURE: CPT

## 2018-04-09 PROCEDURE — G8978 MOBILITY CURRENT STATUS: HCPCS

## 2018-04-09 RX ORDER — LOSARTAN POTASSIUM 100 MG/1
100 TABLET ORAL DAILY
Status: ON HOLD | COMMUNITY
End: 2020-12-13

## 2018-04-09 RX ORDER — VERAPAMIL HYDROCHLORIDE 240 MG/1
240 CAPSULE, EXTENDED RELEASE ORAL
COMMUNITY
End: 2021-03-17

## 2018-04-09 RX ORDER — LORAZEPAM 0.5 MG/1
0.5 TABLET ORAL EVERY 4 HOURS
Status: DISCONTINUED | OUTPATIENT
Start: 2018-04-09 | End: 2018-04-10

## 2018-04-09 RX ORDER — ACETAMINOPHEN 500 MG
500 TABLET ORAL EVERY 4 HOURS
Status: DISCONTINUED | OUTPATIENT
Start: 2018-04-09 | End: 2018-04-14

## 2018-04-09 RX ORDER — PRAVASTATIN SODIUM 20 MG
20 TABLET ORAL NIGHTLY
Status: DISCONTINUED | OUTPATIENT
Start: 2018-04-09 | End: 2018-04-27 | Stop reason: HOSPADM

## 2018-04-09 RX ORDER — METHYLPHENIDATE HYDROCHLORIDE 10 MG/1
20 TABLET ORAL 2 TIMES DAILY
Status: ON HOLD | COMMUNITY
End: 2018-04-27 | Stop reason: HOSPADM

## 2018-04-09 RX ORDER — DIPHENHYDRAMINE HYDROCHLORIDE 50 MG/ML
25 INJECTION INTRAMUSCULAR; INTRAVENOUS EVERY 6 HOURS PRN
Status: DISCONTINUED | OUTPATIENT
Start: 2018-04-09 | End: 2018-04-16

## 2018-04-09 RX ORDER — CARVEDILOL 25 MG/1
25 TABLET ORAL 2 TIMES DAILY WITH MEALS
COMMUNITY
End: 2021-03-17 | Stop reason: SDUPTHER

## 2018-04-09 RX ORDER — PRAVASTATIN SODIUM 20 MG
20 TABLET ORAL NIGHTLY
Status: ON HOLD | COMMUNITY
End: 2020-12-15 | Stop reason: HOSPADM

## 2018-04-09 RX ORDER — CARVEDILOL 25 MG/1
25 TABLET ORAL 2 TIMES DAILY WITH MEALS
Status: DISCONTINUED | OUTPATIENT
Start: 2018-04-09 | End: 2018-04-27 | Stop reason: HOSPADM

## 2018-04-09 RX ORDER — VERAPAMIL HYDROCHLORIDE 240 MG/1
240 TABLET, FILM COATED, EXTENDED RELEASE ORAL
Status: DISCONTINUED | OUTPATIENT
Start: 2018-04-10 | End: 2018-04-16

## 2018-04-09 RX ORDER — MORPHINE SULFATE 2 MG/ML
2 INJECTION, SOLUTION INTRAMUSCULAR; INTRAVENOUS EVERY 4 HOURS PRN
Status: DISCONTINUED | OUTPATIENT
Start: 2018-04-09 | End: 2018-04-10

## 2018-04-09 RX ORDER — PHENOBARBITAL 32.4 MG/1
64.8 TABLET ORAL 3 TIMES DAILY
Status: DISCONTINUED | OUTPATIENT
Start: 2018-04-09 | End: 2018-04-10

## 2018-04-09 RX ORDER — LOSARTAN POTASSIUM 50 MG/1
100 TABLET ORAL DAILY
Status: DISCONTINUED | OUTPATIENT
Start: 2018-04-09 | End: 2018-04-27 | Stop reason: HOSPADM

## 2018-04-09 RX ORDER — PREGABALIN 75 MG/1
75 CAPSULE ORAL 2 TIMES DAILY
Status: DISCONTINUED | OUTPATIENT
Start: 2018-04-09 | End: 2018-04-21

## 2018-04-09 RX ADMIN — OXYCODONE HYDROCHLORIDE 10 MG: 10 TABLET ORAL at 21:40

## 2018-04-09 RX ADMIN — PREGABALIN 75 MG: 75 CAPSULE ORAL at 09:15

## 2018-04-09 RX ADMIN — PREGABALIN 75 MG: 75 CAPSULE ORAL at 21:40

## 2018-04-09 RX ADMIN — LORAZEPAM 0.5 MG: 0.5 TABLET ORAL at 14:32

## 2018-04-09 RX ADMIN — Medication 1 MG: at 09:16

## 2018-04-09 RX ADMIN — POLYETHYLENE GLYCOL 3350 17 G: 17 POWDER, FOR SOLUTION ORAL at 09:15

## 2018-04-09 RX ADMIN — ACETAMINOPHEN 500 MG: 500 TABLET, COATED ORAL at 07:06

## 2018-04-09 RX ADMIN — OXYCODONE HYDROCHLORIDE 10 MG: 10 TABLET ORAL at 02:31

## 2018-04-09 RX ADMIN — STANDARDIZED SENNA CONCENTRATE AND DOCUSATE SODIUM 1 TABLET: 8.6; 5 TABLET, FILM COATED ORAL at 09:15

## 2018-04-09 RX ADMIN — INSULIN LISPRO 1 UNITS: 100 INJECTION, SOLUTION INTRAVENOUS; SUBCUTANEOUS at 16:21

## 2018-04-09 RX ADMIN — Medication 10 ML: at 21:40

## 2018-04-09 RX ADMIN — ACETAMINOPHEN 500 MG: 500 TABLET, COATED ORAL at 09:15

## 2018-04-09 RX ADMIN — LOSARTAN POTASSIUM 100 MG: 50 TABLET, FILM COATED ORAL at 16:18

## 2018-04-09 RX ADMIN — BISACODYL 10 MG: 10 SUPPOSITORY RECTAL at 09:16

## 2018-04-09 RX ADMIN — METHOCARBAMOL 1000 MG: 500 TABLET ORAL at 13:32

## 2018-04-09 RX ADMIN — MORPHINE SULFATE 2 MG: 2 INJECTION, SOLUTION INTRAMUSCULAR; INTRAVENOUS at 09:18

## 2018-04-09 RX ADMIN — METHOCARBAMOL 1000 MG: 500 TABLET ORAL at 21:39

## 2018-04-09 RX ADMIN — Medication 10 ML: at 14:32

## 2018-04-09 RX ADMIN — CARVEDILOL 25 MG: 25 TABLET, FILM COATED ORAL at 16:18

## 2018-04-09 RX ADMIN — LORAZEPAM 0.5 MG: 0.5 TABLET ORAL at 21:40

## 2018-04-09 RX ADMIN — Medication 100 MG: at 09:16

## 2018-04-09 RX ADMIN — PRAVASTATIN SODIUM 20 MG: 20 TABLET ORAL at 21:39

## 2018-04-09 RX ADMIN — METHOCARBAMOL 1000 MG: 500 TABLET ORAL at 09:16

## 2018-04-09 RX ADMIN — PHENOBARBITAL 64.8 MG: 32.4 TABLET ORAL at 13:33

## 2018-04-09 RX ADMIN — ENOXAPARIN SODIUM 30 MG: 30 INJECTION SUBCUTANEOUS at 09:17

## 2018-04-09 RX ADMIN — DOCUSATE SODIUM 100 MG: 100 CAPSULE, LIQUID FILLED ORAL at 09:17

## 2018-04-09 RX ADMIN — ACETAMINOPHEN 500 MG: 500 TABLET, COATED ORAL at 21:39

## 2018-04-09 RX ADMIN — INSULIN LISPRO 1 UNITS: 100 INJECTION, SOLUTION INTRAVENOUS; SUBCUTANEOUS at 20:55

## 2018-04-09 RX ADMIN — OXYCODONE HYDROCHLORIDE 10 MG: 10 TABLET ORAL at 11:22

## 2018-04-09 RX ADMIN — Medication 10 ML: at 09:17

## 2018-04-09 RX ADMIN — LORAZEPAM 0.5 MG: 0.5 TABLET ORAL at 17:52

## 2018-04-09 RX ADMIN — ENOXAPARIN SODIUM 30 MG: 30 INJECTION SUBCUTANEOUS at 21:40

## 2018-04-09 RX ADMIN — MULTIPLE VITAMINS W/ MINERALS TAB 1 TABLET: TAB at 09:17

## 2018-04-09 RX ADMIN — PHENOBARBITAL 64.8 MG: 32.4 TABLET ORAL at 09:15

## 2018-04-09 RX ADMIN — MORPHINE SULFATE 2 MG: 2 INJECTION, SOLUTION INTRAMUSCULAR; INTRAVENOUS at 14:32

## 2018-04-09 RX ADMIN — ACETAMINOPHEN 500 MG: 500 TABLET, COATED ORAL at 14:32

## 2018-04-09 RX ADMIN — METHOCARBAMOL 1000 MG: 500 TABLET ORAL at 16:18

## 2018-04-09 RX ADMIN — DOCUSATE SODIUM 100 MG: 100 CAPSULE, LIQUID FILLED ORAL at 21:39

## 2018-04-09 RX ADMIN — INSULIN LISPRO 1 UNITS: 100 INJECTION, SOLUTION INTRAVENOUS; SUBCUTANEOUS at 09:45

## 2018-04-09 RX ADMIN — OXYCODONE HYDROCHLORIDE 10 MG: 10 TABLET ORAL at 16:19

## 2018-04-09 RX ADMIN — OXYCODONE HYDROCHLORIDE 10 MG: 10 TABLET ORAL at 07:08

## 2018-04-09 RX ADMIN — Medication 4 MG: at 03:48

## 2018-04-09 RX ADMIN — ACETAMINOPHEN 500 MG: 500 TABLET, COATED ORAL at 17:52

## 2018-04-09 RX ADMIN — PHENOBARBITAL 64.8 MG: 32.4 TABLET ORAL at 21:39

## 2018-04-09 ASSESSMENT — PAIN DESCRIPTION - LOCATION
LOCATION: LEG
LOCATION: LEG
LOCATION: ARM;FOOT;LEG
LOCATION: LEG
LOCATION: LEG
LOCATION: ARM;FOOT;LEG

## 2018-04-09 ASSESSMENT — PAIN SCALES - GENERAL
PAINLEVEL_OUTOF10: 8
PAINLEVEL_OUTOF10: 10
PAINLEVEL_OUTOF10: 8
PAINLEVEL_OUTOF10: 0
PAINLEVEL_OUTOF10: 10
PAINLEVEL_OUTOF10: 0
PAINLEVEL_OUTOF10: 8
PAINLEVEL_OUTOF10: 7
PAINLEVEL_OUTOF10: 7
PAINLEVEL_OUTOF10: 8
PAINLEVEL_OUTOF10: 0
PAINLEVEL_OUTOF10: 3
PAINLEVEL_OUTOF10: 6
PAINLEVEL_OUTOF10: 9
PAINLEVEL_OUTOF10: 0
PAINLEVEL_OUTOF10: 10
PAINLEVEL_OUTOF10: 10
PAINLEVEL_OUTOF10: 8

## 2018-04-09 ASSESSMENT — PAIN DESCRIPTION - PAIN TYPE
TYPE: SURGICAL PAIN
TYPE: SURGICAL PAIN
TYPE: ACUTE PAIN;SURGICAL PAIN

## 2018-04-09 ASSESSMENT — PAIN DESCRIPTION - ORIENTATION
ORIENTATION: RIGHT;LEFT

## 2018-04-09 ASSESSMENT — PAIN DESCRIPTION - DESCRIPTORS
DESCRIPTORS: CONSTANT;DISCOMFORT;THROBBING
DESCRIPTORS: ACHING;DISCOMFORT;SORE
DESCRIPTORS: CONSTANT;DISCOMFORT;THROBBING
DESCRIPTORS: ACHING;CONSTANT;DISCOMFORT;SHARP
DESCRIPTORS: ACHING;BURNING;CONSTANT;SHARP
DESCRIPTORS: CONSTANT;DISCOMFORT;THROBBING

## 2018-04-09 ASSESSMENT — PAIN DESCRIPTION - FREQUENCY
FREQUENCY: CONTINUOUS
FREQUENCY: CONTINUOUS

## 2018-04-10 LAB
BASOPHILS ABSOLUTE: 0.04 E9/L (ref 0–0.2)
BASOPHILS RELATIVE PERCENT: 0.4 % (ref 0–2)
EOSINOPHILS ABSOLUTE: 0.07 E9/L (ref 0.05–0.5)
EOSINOPHILS RELATIVE PERCENT: 0.7 % (ref 0–6)
HCT VFR BLD CALC: 22 % (ref 37–54)
HEMOGLOBIN: 7.4 G/DL (ref 12.5–16.5)
IMMATURE GRANULOCYTES #: 0.09 E9/L
IMMATURE GRANULOCYTES %: 0.9 % (ref 0–5)
LYMPHOCYTES ABSOLUTE: 2.62 E9/L (ref 1.5–4)
LYMPHOCYTES RELATIVE PERCENT: 26.7 % (ref 20–42)
MCH RBC QN AUTO: 31.2 PG (ref 26–35)
MCHC RBC AUTO-ENTMCNC: 33.6 % (ref 32–34.5)
MCV RBC AUTO: 92.8 FL (ref 80–99.9)
METER GLUCOSE: 129 MG/DL (ref 70–110)
METER GLUCOSE: 130 MG/DL (ref 70–110)
METER GLUCOSE: 131 MG/DL (ref 70–110)
METER GLUCOSE: 136 MG/DL (ref 70–110)
MONOCYTES ABSOLUTE: 0.99 E9/L (ref 0.1–0.95)
MONOCYTES RELATIVE PERCENT: 10.1 % (ref 2–12)
NEUTROPHILS ABSOLUTE: 6.02 E9/L (ref 1.8–7.3)
NEUTROPHILS RELATIVE PERCENT: 61.2 % (ref 43–80)
PDW BLD-RTO: 14.2 FL (ref 11.5–15)
PLATELET # BLD: 150 E9/L (ref 130–450)
PMV BLD AUTO: 10.7 FL (ref 7–12)
RBC # BLD: 2.37 E12/L (ref 3.8–5.8)
WBC # BLD: 9.8 E9/L (ref 4.5–11.5)

## 2018-04-10 PROCEDURE — 6370000000 HC RX 637 (ALT 250 FOR IP): Performed by: STUDENT IN AN ORGANIZED HEALTH CARE EDUCATION/TRAINING PROGRAM

## 2018-04-10 PROCEDURE — 1200000000 HC SEMI PRIVATE

## 2018-04-10 PROCEDURE — 6370000000 HC RX 637 (ALT 250 FOR IP): Performed by: NURSE PRACTITIONER

## 2018-04-10 PROCEDURE — 97530 THERAPEUTIC ACTIVITIES: CPT

## 2018-04-10 PROCEDURE — 6360000002 HC RX W HCPCS: Performed by: STUDENT IN AN ORGANIZED HEALTH CARE EDUCATION/TRAINING PROGRAM

## 2018-04-10 PROCEDURE — 82962 GLUCOSE BLOOD TEST: CPT

## 2018-04-10 PROCEDURE — 99232 SBSQ HOSP IP/OBS MODERATE 35: CPT | Performed by: SURGERY

## 2018-04-10 PROCEDURE — 97112 NEUROMUSCULAR REEDUCATION: CPT

## 2018-04-10 PROCEDURE — 85025 COMPLETE CBC W/AUTO DIFF WBC: CPT

## 2018-04-10 PROCEDURE — 2580000003 HC RX 258: Performed by: STUDENT IN AN ORGANIZED HEALTH CARE EDUCATION/TRAINING PROGRAM

## 2018-04-10 PROCEDURE — 36415 COLL VENOUS BLD VENIPUNCTURE: CPT

## 2018-04-10 PROCEDURE — 97535 SELF CARE MNGMENT TRAINING: CPT

## 2018-04-10 RX ORDER — ONDANSETRON 2 MG/ML
4 INJECTION INTRAMUSCULAR; INTRAVENOUS EVERY 6 HOURS PRN
Qty: 84 ML | Status: CANCELLED | DISCHARGE
Start: 2018-04-10

## 2018-04-10 RX ORDER — NICOTINE 21 MG/24HR
1 PATCH, TRANSDERMAL 24 HOURS TRANSDERMAL DAILY
Qty: 30 PATCH | Refills: 3 | Status: CANCELLED | DISCHARGE
Start: 2018-04-11

## 2018-04-10 RX ORDER — FOLIC ACID 1 MG/1
1 TABLET ORAL DAILY
Qty: 30 TABLET | Refills: 3 | Status: CANCELLED | DISCHARGE
Start: 2018-04-11

## 2018-04-10 RX ORDER — PSEUDOEPHEDRINE HCL 30 MG
100 TABLET ORAL 2 TIMES DAILY
Status: CANCELLED | DISCHARGE
Start: 2018-04-10

## 2018-04-10 RX ORDER — SENNA AND DOCUSATE SODIUM 50; 8.6 MG/1; MG/1
1 TABLET, FILM COATED ORAL DAILY
Status: CANCELLED | DISCHARGE
Start: 2018-04-11

## 2018-04-10 RX ORDER — ACETAMINOPHEN 500 MG
500 TABLET ORAL EVERY 4 HOURS
Qty: 120 TABLET | Refills: 3 | Status: CANCELLED | DISCHARGE
Start: 2018-04-10

## 2018-04-10 RX ORDER — BISACODYL 10 MG
10 SUPPOSITORY, RECTAL RECTAL DAILY PRN
Status: CANCELLED | DISCHARGE
Start: 2018-04-10 | End: 2018-05-10

## 2018-04-10 RX ORDER — PREGABALIN 75 MG/1
75 CAPSULE ORAL 2 TIMES DAILY
Qty: 60 CAPSULE | Refills: 0 | Status: CANCELLED | DISCHARGE
Start: 2018-04-10 | End: 2018-04-20

## 2018-04-10 RX ORDER — M-VIT,TX,IRON,MINS/CALC/FOLIC 27MG-0.4MG
1 TABLET ORAL DAILY
Status: CANCELLED | DISCHARGE
Start: 2018-04-11

## 2018-04-10 RX ORDER — PHENOBARBITAL 64.8 MG/1
130 TABLET ORAL 3 TIMES DAILY
Status: CANCELLED | DISCHARGE
Start: 2018-04-10 | End: 2018-04-24

## 2018-04-10 RX ORDER — PHENOBARBITAL 32.4 MG/1
130 TABLET ORAL 3 TIMES DAILY
Status: DISCONTINUED | OUTPATIENT
Start: 2018-04-10 | End: 2018-04-12

## 2018-04-10 RX ORDER — METHOCARBAMOL 500 MG/1
1000 TABLET, FILM COATED ORAL 4 TIMES DAILY
Qty: 80 TABLET | Refills: 0 | Status: CANCELLED | DISCHARGE
Start: 2018-04-10 | End: 2018-04-20

## 2018-04-10 RX ORDER — LANOLIN ALCOHOL/MO/W.PET/CERES
100 CREAM (GRAM) TOPICAL DAILY
Qty: 30 TABLET | Refills: 3 | Status: CANCELLED | DISCHARGE
Start: 2018-04-11

## 2018-04-10 RX ADMIN — OXYCODONE HYDROCHLORIDE 10 MG: 10 TABLET ORAL at 13:50

## 2018-04-10 RX ADMIN — ACETAMINOPHEN 500 MG: 500 TABLET, COATED ORAL at 02:18

## 2018-04-10 RX ADMIN — CARVEDILOL 25 MG: 25 TABLET, FILM COATED ORAL at 16:55

## 2018-04-10 RX ADMIN — PREGABALIN 75 MG: 75 CAPSULE ORAL at 21:47

## 2018-04-10 RX ADMIN — LORAZEPAM 0.5 MG: 0.5 TABLET ORAL at 02:19

## 2018-04-10 RX ADMIN — ENOXAPARIN SODIUM 30 MG: 30 INJECTION SUBCUTANEOUS at 09:24

## 2018-04-10 RX ADMIN — LORAZEPAM 0.5 MG: 0.5 TABLET ORAL at 13:50

## 2018-04-10 RX ADMIN — OXYCODONE HYDROCHLORIDE 10 MG: 10 TABLET ORAL at 06:42

## 2018-04-10 RX ADMIN — METHOCARBAMOL 1000 MG: 500 TABLET ORAL at 13:49

## 2018-04-10 RX ADMIN — ACETAMINOPHEN 500 MG: 500 TABLET, COATED ORAL at 13:50

## 2018-04-10 RX ADMIN — LOSARTAN POTASSIUM 100 MG: 50 TABLET, FILM COATED ORAL at 09:22

## 2018-04-10 RX ADMIN — PRAVASTATIN SODIUM 20 MG: 20 TABLET ORAL at 21:47

## 2018-04-10 RX ADMIN — Medication 1 MG: at 09:22

## 2018-04-10 RX ADMIN — METHOCARBAMOL 1000 MG: 500 TABLET ORAL at 21:47

## 2018-04-10 RX ADMIN — PREGABALIN 75 MG: 75 CAPSULE ORAL at 09:23

## 2018-04-10 RX ADMIN — ACETAMINOPHEN 500 MG: 500 TABLET, COATED ORAL at 10:11

## 2018-04-10 RX ADMIN — VERAPAMIL HYDROCHLORIDE 240 MG: 240 TABLET, FILM COATED, EXTENDED RELEASE ORAL at 09:23

## 2018-04-10 RX ADMIN — LORAZEPAM 0.5 MG: 0.5 TABLET ORAL at 10:11

## 2018-04-10 RX ADMIN — ENOXAPARIN SODIUM 30 MG: 30 INJECTION SUBCUTANEOUS at 21:48

## 2018-04-10 RX ADMIN — METHOCARBAMOL 1000 MG: 500 TABLET ORAL at 16:54

## 2018-04-10 RX ADMIN — Medication 100 MG: at 09:23

## 2018-04-10 RX ADMIN — DOCUSATE SODIUM 100 MG: 100 CAPSULE, LIQUID FILLED ORAL at 09:23

## 2018-04-10 RX ADMIN — MULTIPLE VITAMINS W/ MINERALS TAB 1 TABLET: TAB at 09:22

## 2018-04-10 RX ADMIN — Medication 10 ML: at 09:24

## 2018-04-10 RX ADMIN — ACETAMINOPHEN 500 MG: 500 TABLET, COATED ORAL at 21:47

## 2018-04-10 RX ADMIN — DOCUSATE SODIUM 100 MG: 100 CAPSULE, LIQUID FILLED ORAL at 21:48

## 2018-04-10 RX ADMIN — CARVEDILOL 25 MG: 25 TABLET, FILM COATED ORAL at 09:22

## 2018-04-10 RX ADMIN — PHENOBARBITAL 64.8 MG: 32.4 TABLET ORAL at 09:23

## 2018-04-10 RX ADMIN — Medication 10 ML: at 21:48

## 2018-04-10 RX ADMIN — PHENOBARBITAL 130 MG: 32.4 TABLET ORAL at 21:48

## 2018-04-10 RX ADMIN — PHENOBARBITAL 130 MG: 32.4 TABLET ORAL at 13:49

## 2018-04-10 RX ADMIN — OXYCODONE HYDROCHLORIDE 10 MG: 10 TABLET ORAL at 02:24

## 2018-04-10 RX ADMIN — STANDARDIZED SENNA CONCENTRATE AND DOCUSATE SODIUM 1 TABLET: 8.6; 5 TABLET, FILM COATED ORAL at 09:23

## 2018-04-10 RX ADMIN — ACETAMINOPHEN 500 MG: 500 TABLET, COATED ORAL at 06:40

## 2018-04-10 RX ADMIN — LORAZEPAM 0.5 MG: 0.5 TABLET ORAL at 06:41

## 2018-04-10 RX ADMIN — OXYCODONE HYDROCHLORIDE 5 MG: 5 TABLET ORAL at 19:26

## 2018-04-10 RX ADMIN — OXYCODONE HYDROCHLORIDE 5 MG: 5 TABLET ORAL at 23:39

## 2018-04-10 RX ADMIN — METHOCARBAMOL 1000 MG: 500 TABLET ORAL at 09:22

## 2018-04-10 RX ADMIN — ACETAMINOPHEN 500 MG: 500 TABLET, COATED ORAL at 19:26

## 2018-04-10 ASSESSMENT — PAIN DESCRIPTION - ONSET
ONSET: ON-GOING
ONSET: ON-GOING

## 2018-04-10 ASSESSMENT — PAIN DESCRIPTION - FREQUENCY
FREQUENCY: CONTINUOUS
FREQUENCY: CONTINUOUS

## 2018-04-10 ASSESSMENT — PAIN DESCRIPTION - PAIN TYPE
TYPE: ACUTE PAIN
TYPE: ACUTE PAIN

## 2018-04-10 ASSESSMENT — PAIN DESCRIPTION - ORIENTATION
ORIENTATION: RIGHT;LEFT
ORIENTATION: LEFT

## 2018-04-10 ASSESSMENT — PAIN SCALES - GENERAL
PAINLEVEL_OUTOF10: 0
PAINLEVEL_OUTOF10: 4
PAINLEVEL_OUTOF10: 8
PAINLEVEL_OUTOF10: 7
PAINLEVEL_OUTOF10: 4
PAINLEVEL_OUTOF10: 4
PAINLEVEL_OUTOF10: 8
PAINLEVEL_OUTOF10: 0
PAINLEVEL_OUTOF10: 0
PAINLEVEL_OUTOF10: 10
PAINLEVEL_OUTOF10: 7

## 2018-04-10 ASSESSMENT — PAIN DESCRIPTION - DESCRIPTORS
DESCRIPTORS: ACHING
DESCRIPTORS: ACHING;CONSTANT;DISCOMFORT

## 2018-04-10 ASSESSMENT — PAIN DESCRIPTION - LOCATION
LOCATION: SHOULDER
LOCATION: LEG

## 2018-04-10 ASSESSMENT — PAIN DESCRIPTION - PROGRESSION
CLINICAL_PROGRESSION: NOT CHANGED
CLINICAL_PROGRESSION: NOT CHANGED

## 2018-04-11 ENCOUNTER — APPOINTMENT (OUTPATIENT)
Dept: GENERAL RADIOLOGY | Age: 53
DRG: 003 | End: 2018-04-11
Payer: MEDICARE

## 2018-04-11 LAB
ALBUMIN SERPL-MCNC: 3.3 G/DL (ref 3.5–5.2)
ALP BLD-CCNC: 60 U/L (ref 40–129)
ALT SERPL-CCNC: 34 U/L (ref 0–40)
ANION GAP SERPL CALCULATED.3IONS-SCNC: 15 MMOL/L (ref 7–16)
APTT: 25.3 SEC (ref 24.5–35.1)
AST SERPL-CCNC: 63 U/L (ref 0–39)
BASOPHILS ABSOLUTE: 0.04 E9/L (ref 0–0.2)
BASOPHILS RELATIVE PERCENT: 0.4 % (ref 0–2)
BILIRUB SERPL-MCNC: 0.4 MG/DL (ref 0–1.2)
BUN BLDV-MCNC: 16 MG/DL (ref 6–20)
CALCIUM SERPL-MCNC: 8.8 MG/DL (ref 8.6–10.2)
CHLORIDE BLD-SCNC: 95 MMOL/L (ref 98–107)
CO2: 28 MMOL/L (ref 22–29)
CREAT SERPL-MCNC: 1 MG/DL (ref 0.7–1.2)
EOSINOPHILS ABSOLUTE: 0.08 E9/L (ref 0.05–0.5)
EOSINOPHILS RELATIVE PERCENT: 0.8 % (ref 0–6)
GFR AFRICAN AMERICAN: >60
GFR NON-AFRICAN AMERICAN: >60 ML/MIN/1.73
GLUCOSE BLD-MCNC: 144 MG/DL (ref 74–109)
HCT VFR BLD CALC: 21.1 % (ref 37–54)
HCT VFR BLD CALC: 24.3 % (ref 37–54)
HEMOGLOBIN: 7.1 G/DL (ref 12.5–16.5)
HEMOGLOBIN: 7.9 G/DL (ref 12.5–16.5)
IMMATURE GRANULOCYTES #: 0.12 E9/L
IMMATURE GRANULOCYTES %: 1.1 % (ref 0–5)
INR BLD: 1.1
LYMPHOCYTES ABSOLUTE: 1.83 E9/L (ref 1.5–4)
LYMPHOCYTES RELATIVE PERCENT: 17.4 % (ref 20–42)
MCH RBC QN AUTO: 30.9 PG (ref 26–35)
MCH RBC QN AUTO: 31.1 PG (ref 26–35)
MCHC RBC AUTO-ENTMCNC: 32.5 % (ref 32–34.5)
MCHC RBC AUTO-ENTMCNC: 33.6 % (ref 32–34.5)
MCV RBC AUTO: 92.5 FL (ref 80–99.9)
MCV RBC AUTO: 94.9 FL (ref 80–99.9)
METER GLUCOSE: 123 MG/DL (ref 70–110)
METER GLUCOSE: 135 MG/DL (ref 70–110)
METER GLUCOSE: 149 MG/DL (ref 70–110)
METER GLUCOSE: 158 MG/DL (ref 70–110)
MONOCYTES ABSOLUTE: 0.93 E9/L (ref 0.1–0.95)
MONOCYTES RELATIVE PERCENT: 8.8 % (ref 2–12)
NEUTROPHILS ABSOLUTE: 7.54 E9/L (ref 1.8–7.3)
NEUTROPHILS RELATIVE PERCENT: 71.5 % (ref 43–80)
PDW BLD-RTO: 14.2 FL (ref 11.5–15)
PDW BLD-RTO: 14.4 FL (ref 11.5–15)
PLATELET # BLD: 186 E9/L (ref 130–450)
PLATELET # BLD: 189 E9/L (ref 130–450)
PMV BLD AUTO: 10 FL (ref 7–12)
PMV BLD AUTO: 10.4 FL (ref 7–12)
POTASSIUM SERPL-SCNC: 4.2 MMOL/L (ref 3.5–5)
PROTHROMBIN TIME: 12 SEC (ref 9.3–12.4)
RBC # BLD: 2.28 E12/L (ref 3.8–5.8)
RBC # BLD: 2.56 E12/L (ref 3.8–5.8)
SODIUM BLD-SCNC: 138 MMOL/L (ref 132–146)
TOTAL PROTEIN: 5.8 G/DL (ref 6.4–8.3)
WBC # BLD: 10.5 E9/L (ref 4.5–11.5)
WBC # BLD: 7.4 E9/L (ref 4.5–11.5)

## 2018-04-11 PROCEDURE — 99232 SBSQ HOSP IP/OBS MODERATE 35: CPT | Performed by: SURGERY

## 2018-04-11 PROCEDURE — 6360000002 HC RX W HCPCS: Performed by: STUDENT IN AN ORGANIZED HEALTH CARE EDUCATION/TRAINING PROGRAM

## 2018-04-11 PROCEDURE — 85025 COMPLETE CBC W/AUTO DIFF WBC: CPT

## 2018-04-11 PROCEDURE — 36415 COLL VENOUS BLD VENIPUNCTURE: CPT

## 2018-04-11 PROCEDURE — 80053 COMPREHEN METABOLIC PANEL: CPT

## 2018-04-11 PROCEDURE — 86901 BLOOD TYPING SEROLOGIC RH(D): CPT

## 2018-04-11 PROCEDURE — 85610 PROTHROMBIN TIME: CPT

## 2018-04-11 PROCEDURE — 1200000000 HC SEMI PRIVATE

## 2018-04-11 PROCEDURE — 86900 BLOOD TYPING SEROLOGIC ABO: CPT

## 2018-04-11 PROCEDURE — 85730 THROMBOPLASTIN TIME PARTIAL: CPT

## 2018-04-11 PROCEDURE — 6370000000 HC RX 637 (ALT 250 FOR IP)

## 2018-04-11 PROCEDURE — 6360000002 HC RX W HCPCS: Performed by: NURSE ANESTHETIST, CERTIFIED REGISTERED

## 2018-04-11 PROCEDURE — 6360000002 HC RX W HCPCS

## 2018-04-11 PROCEDURE — 82962 GLUCOSE BLOOD TEST: CPT

## 2018-04-11 PROCEDURE — 6370000000 HC RX 637 (ALT 250 FOR IP): Performed by: STUDENT IN AN ORGANIZED HEALTH CARE EDUCATION/TRAINING PROGRAM

## 2018-04-11 PROCEDURE — 86850 RBC ANTIBODY SCREEN: CPT

## 2018-04-11 PROCEDURE — 6370000000 HC RX 637 (ALT 250 FOR IP): Performed by: NURSE PRACTITIONER

## 2018-04-11 PROCEDURE — 6360000002 HC RX W HCPCS: Performed by: ORTHOPAEDIC SURGERY

## 2018-04-11 PROCEDURE — 71045 X-RAY EXAM CHEST 1 VIEW: CPT

## 2018-04-11 PROCEDURE — 85027 COMPLETE CBC AUTOMATED: CPT

## 2018-04-11 RX ORDER — MORPHINE SULFATE 2 MG/ML
INJECTION, SOLUTION INTRAMUSCULAR; INTRAVENOUS
Status: COMPLETED
Start: 2018-04-11 | End: 2018-04-11

## 2018-04-11 RX ORDER — LORAZEPAM 1 MG/1
1 TABLET ORAL EVERY 6 HOURS PRN
Status: DISCONTINUED | OUTPATIENT
Start: 2018-04-11 | End: 2018-04-12

## 2018-04-11 RX ORDER — PROMETHAZINE HYDROCHLORIDE 25 MG/ML
25 INJECTION, SOLUTION INTRAMUSCULAR; INTRAVENOUS ONCE
Status: COMPLETED | OUTPATIENT
Start: 2018-04-11 | End: 2018-04-11

## 2018-04-11 RX ORDER — MORPHINE SULFATE 4 MG/ML
5 INJECTION, SOLUTION INTRAMUSCULAR; INTRAVENOUS ONCE
Status: COMPLETED | OUTPATIENT
Start: 2018-04-11 | End: 2018-04-11

## 2018-04-11 RX ORDER — PROMETHAZINE HYDROCHLORIDE 25 MG/ML
INJECTION, SOLUTION INTRAMUSCULAR; INTRAVENOUS
Status: COMPLETED
Start: 2018-04-11 | End: 2018-04-11

## 2018-04-11 RX ORDER — LORAZEPAM 1 MG/1
TABLET ORAL
Status: COMPLETED
Start: 2018-04-11 | End: 2018-04-11

## 2018-04-11 RX ORDER — HALOPERIDOL 5 MG/ML
2 INJECTION INTRAMUSCULAR EVERY 6 HOURS PRN
Status: DISCONTINUED | OUTPATIENT
Start: 2018-04-11 | End: 2018-04-12

## 2018-04-11 RX ORDER — LORAZEPAM 0.5 MG/1
0.5 TABLET ORAL EVERY 6 HOURS PRN
Status: DISCONTINUED | OUTPATIENT
Start: 2018-04-11 | End: 2018-04-11

## 2018-04-11 RX ADMIN — HALOPERIDOL LACTATE 5 MG: 5 INJECTION INTRAMUSCULAR at 23:51

## 2018-04-11 RX ADMIN — PREGABALIN 75 MG: 75 CAPSULE ORAL at 19:48

## 2018-04-11 RX ADMIN — PHENOBARBITAL 130 MG: 32.4 TABLET ORAL at 09:14

## 2018-04-11 RX ADMIN — LORAZEPAM 0.5 MG: 0.5 TABLET ORAL at 05:37

## 2018-04-11 RX ADMIN — PRAVASTATIN SODIUM 20 MG: 20 TABLET ORAL at 19:48

## 2018-04-11 RX ADMIN — MORPHINE SULFATE 5 MG: 4 INJECTION, SOLUTION INTRAMUSCULAR; INTRAVENOUS at 06:22

## 2018-04-11 RX ADMIN — METHOCARBAMOL 1000 MG: 500 TABLET ORAL at 16:31

## 2018-04-11 RX ADMIN — OXYCODONE HYDROCHLORIDE 5 MG: 5 TABLET ORAL at 08:59

## 2018-04-11 RX ADMIN — Medication 100 MG: at 09:01

## 2018-04-11 RX ADMIN — MULTIPLE VITAMINS W/ MINERALS TAB 1 TABLET: TAB at 09:00

## 2018-04-11 RX ADMIN — PROMETHAZINE HYDROCHLORIDE 25 MG: 25 INJECTION INTRAMUSCULAR; INTRAVENOUS at 06:23

## 2018-04-11 RX ADMIN — ENOXAPARIN SODIUM 30 MG: 30 INJECTION SUBCUTANEOUS at 09:00

## 2018-04-11 RX ADMIN — CARVEDILOL 25 MG: 25 TABLET, FILM COATED ORAL at 16:28

## 2018-04-11 RX ADMIN — ACETAMINOPHEN 500 MG: 500 TABLET, COATED ORAL at 12:49

## 2018-04-11 RX ADMIN — OXYCODONE HYDROCHLORIDE 5 MG: 5 TABLET ORAL at 20:34

## 2018-04-11 RX ADMIN — OXYCODONE HYDROCHLORIDE 5 MG: 5 TABLET ORAL at 03:54

## 2018-04-11 RX ADMIN — VERAPAMIL HYDROCHLORIDE 240 MG: 240 TABLET, FILM COATED, EXTENDED RELEASE ORAL at 09:01

## 2018-04-11 RX ADMIN — STANDARDIZED SENNA CONCENTRATE AND DOCUSATE SODIUM 1 TABLET: 8.6; 5 TABLET, FILM COATED ORAL at 09:02

## 2018-04-11 RX ADMIN — POLYETHYLENE GLYCOL 3350 17 G: 17 POWDER, FOR SOLUTION ORAL at 09:00

## 2018-04-11 RX ADMIN — PREGABALIN 75 MG: 75 CAPSULE ORAL at 09:00

## 2018-04-11 RX ADMIN — METHOCARBAMOL 1000 MG: 500 TABLET ORAL at 14:19

## 2018-04-11 RX ADMIN — DOCUSATE SODIUM 100 MG: 100 CAPSULE, LIQUID FILLED ORAL at 19:47

## 2018-04-11 RX ADMIN — DOCUSATE SODIUM 100 MG: 100 CAPSULE, LIQUID FILLED ORAL at 09:00

## 2018-04-11 RX ADMIN — SUCCINYLCHOLINE CHLORIDE 170 MG: 20 INJECTION, SOLUTION INTRAMUSCULAR; INTRAVENOUS at 23:55

## 2018-04-11 RX ADMIN — LOSARTAN POTASSIUM 100 MG: 50 TABLET, FILM COATED ORAL at 09:00

## 2018-04-11 RX ADMIN — MORPHINE SULFATE 5 MG: 2 INJECTION, SOLUTION INTRAMUSCULAR; INTRAVENOUS at 06:22

## 2018-04-11 RX ADMIN — PHENOBARBITAL 130 MG: 32.4 TABLET ORAL at 15:19

## 2018-04-11 RX ADMIN — INSULIN LISPRO 1 UNITS: 100 INJECTION, SOLUTION INTRAVENOUS; SUBCUTANEOUS at 11:44

## 2018-04-11 RX ADMIN — LORAZEPAM 0.5 MG: 0.5 TABLET ORAL at 12:53

## 2018-04-11 RX ADMIN — ACETAMINOPHEN 500 MG: 500 TABLET, COATED ORAL at 16:26

## 2018-04-11 RX ADMIN — METHOCARBAMOL 1000 MG: 500 TABLET ORAL at 19:48

## 2018-04-11 RX ADMIN — LORAZEPAM 1 MG: 1 TABLET ORAL at 22:22

## 2018-04-11 RX ADMIN — INSULIN LISPRO 2 UNITS: 100 INJECTION, SOLUTION INTRAVENOUS; SUBCUTANEOUS at 19:56

## 2018-04-11 RX ADMIN — LORAZEPAM 0.5 MG: 0.5 TABLET ORAL at 18:55

## 2018-04-11 RX ADMIN — PROPOFOL 100 MG: 10 INJECTION, EMULSION INTRAVENOUS at 23:55

## 2018-04-11 RX ADMIN — METHOCARBAMOL 1000 MG: 500 TABLET ORAL at 09:01

## 2018-04-11 RX ADMIN — ACETAMINOPHEN 500 MG: 500 TABLET, COATED ORAL at 09:00

## 2018-04-11 RX ADMIN — Medication 1 MG: at 09:01

## 2018-04-11 RX ADMIN — OXYCODONE HYDROCHLORIDE 5 MG: 5 TABLET ORAL at 16:38

## 2018-04-11 RX ADMIN — PHENOBARBITAL 130 MG: 32.4 TABLET ORAL at 19:48

## 2018-04-11 RX ADMIN — ACETAMINOPHEN 500 MG: 500 TABLET, COATED ORAL at 03:54

## 2018-04-11 RX ADMIN — ACETAMINOPHEN 500 MG: 500 TABLET, COATED ORAL at 19:48

## 2018-04-11 RX ADMIN — CARVEDILOL 25 MG: 25 TABLET, FILM COATED ORAL at 09:01

## 2018-04-11 ASSESSMENT — PAIN DESCRIPTION - DESCRIPTORS
DESCRIPTORS: ACHING
DESCRIPTORS: ACHING;CONSTANT;DISCOMFORT
DESCRIPTORS: ACHING;DISCOMFORT;DULL
DESCRIPTORS: ACHING;DISCOMFORT;DULL

## 2018-04-11 ASSESSMENT — PAIN SCALES - GENERAL
PAINLEVEL_OUTOF10: 8
PAINLEVEL_OUTOF10: 7
PAINLEVEL_OUTOF10: 10
PAINLEVEL_OUTOF10: 10
PAINLEVEL_OUTOF10: 6
PAINLEVEL_OUTOF10: 10
PAINLEVEL_OUTOF10: 10
PAINLEVEL_OUTOF10: 7
PAINLEVEL_OUTOF10: 0
PAINLEVEL_OUTOF10: 10
PAINLEVEL_OUTOF10: 8

## 2018-04-11 ASSESSMENT — PAIN DESCRIPTION - FREQUENCY
FREQUENCY: CONTINUOUS

## 2018-04-11 ASSESSMENT — PAIN DESCRIPTION - ORIENTATION
ORIENTATION: LEFT
ORIENTATION: RIGHT;LEFT

## 2018-04-11 ASSESSMENT — PAIN DESCRIPTION - ONSET
ONSET: ON-GOING

## 2018-04-11 ASSESSMENT — PAIN DESCRIPTION - PAIN TYPE
TYPE: ACUTE PAIN
TYPE: SURGICAL PAIN
TYPE: SURGICAL PAIN

## 2018-04-11 ASSESSMENT — PAIN DESCRIPTION - PROGRESSION
CLINICAL_PROGRESSION: GRADUALLY WORSENING
CLINICAL_PROGRESSION: GRADUALLY WORSENING
CLINICAL_PROGRESSION: NOT CHANGED

## 2018-04-11 ASSESSMENT — PAIN DESCRIPTION - LOCATION
LOCATION: LEG

## 2018-04-12 ENCOUNTER — APPOINTMENT (OUTPATIENT)
Dept: GENERAL RADIOLOGY | Age: 53
DRG: 003 | End: 2018-04-12
Payer: MEDICARE

## 2018-04-12 ENCOUNTER — ANESTHESIA EVENT (OUTPATIENT)
Dept: OPERATING ROOM | Age: 53
DRG: 003 | End: 2018-04-12
Payer: MEDICARE

## 2018-04-12 ENCOUNTER — ANESTHESIA (OUTPATIENT)
Dept: SURGICAL ICU | Age: 53
DRG: 003 | End: 2018-04-12
Payer: MEDICARE

## 2018-04-12 ENCOUNTER — ANESTHESIA (OUTPATIENT)
Dept: OPERATING ROOM | Age: 53
DRG: 003 | End: 2018-04-12
Payer: MEDICARE

## 2018-04-12 ENCOUNTER — ANESTHESIA EVENT (OUTPATIENT)
Dept: SURGICAL ICU | Age: 53
DRG: 003 | End: 2018-04-12
Payer: MEDICARE

## 2018-04-12 VITALS
SYSTOLIC BLOOD PRESSURE: 115 MMHG | RESPIRATION RATE: 14 BRPM | OXYGEN SATURATION: 100 % | DIASTOLIC BLOOD PRESSURE: 64 MMHG | TEMPERATURE: 99.3 F

## 2018-04-12 LAB
AADO2: 93.9 MMHG
ABO/RH: NORMAL
ALBUMIN SERPL-MCNC: 2.8 G/DL (ref 3.5–5.2)
ALP BLD-CCNC: 53 U/L (ref 40–129)
ALT SERPL-CCNC: 31 U/L (ref 0–40)
ANION GAP SERPL CALCULATED.3IONS-SCNC: 12 MMOL/L (ref 7–16)
ANION GAP SERPL CALCULATED.3IONS-SCNC: 14 MMOL/L (ref 7–16)
ANTIBODY SCREEN: NORMAL
AST SERPL-CCNC: 53 U/L (ref 0–39)
B.E.: 3.7 MMOL/L (ref -3–3)
BASOPHILS ABSOLUTE: 0.02 E9/L (ref 0–0.2)
BASOPHILS RELATIVE PERCENT: 0.3 % (ref 0–2)
BILIRUB SERPL-MCNC: 0.4 MG/DL (ref 0–1.2)
BUN BLDV-MCNC: 11 MG/DL (ref 6–20)
BUN BLDV-MCNC: 14 MG/DL (ref 6–20)
CALCIUM IONIZED: 1.16 MMOL/L (ref 1.15–1.33)
CALCIUM SERPL-MCNC: 8 MG/DL (ref 8.6–10.2)
CALCIUM SERPL-MCNC: 8.3 MG/DL (ref 8.6–10.2)
CHLORIDE BLD-SCNC: 97 MMOL/L (ref 98–107)
CHLORIDE BLD-SCNC: 98 MMOL/L (ref 98–107)
CO2: 26 MMOL/L (ref 22–29)
CO2: 28 MMOL/L (ref 22–29)
COHB: 2.3 % (ref 0–1.5)
CREAT SERPL-MCNC: 0.6 MG/DL (ref 0.7–1.2)
CREAT SERPL-MCNC: 0.8 MG/DL (ref 0.7–1.2)
CRITICAL: ABNORMAL
DATE ANALYZED: ABNORMAL
DATE OF COLLECTION: ABNORMAL
EKG ATRIAL RATE: 83 BPM
EKG P AXIS: 63 DEGREES
EKG P-R INTERVAL: 154 MS
EKG Q-T INTERVAL: 380 MS
EKG QRS DURATION: 102 MS
EKG QTC CALCULATION (BAZETT): 446 MS
EKG R AXIS: 17 DEGREES
EKG T AXIS: 12 DEGREES
EKG VENTRICULAR RATE: 83 BPM
EOSINOPHILS ABSOLUTE: 0.06 E9/L (ref 0.05–0.5)
EOSINOPHILS RELATIVE PERCENT: 0.8 % (ref 0–6)
FIO2: 40 %
GFR AFRICAN AMERICAN: >60
GFR AFRICAN AMERICAN: >60
GFR NON-AFRICAN AMERICAN: >60 ML/MIN/1.73
GFR NON-AFRICAN AMERICAN: >60 ML/MIN/1.73
GLUCOSE BLD-MCNC: 118 MG/DL (ref 74–109)
GLUCOSE BLD-MCNC: 147 MG/DL (ref 74–109)
HCO3: 28.5 MMOL/L (ref 22–26)
HCT VFR BLD CALC: 20.2 % (ref 37–54)
HCT VFR BLD CALC: 26 % (ref 37–54)
HEMOGLOBIN: 6.4 G/DL (ref 12.5–16.5)
HEMOGLOBIN: 8.5 G/DL (ref 12.5–16.5)
HHB: 0.6 % (ref 0–5)
IMMATURE GRANULOCYTES #: 0.08 E9/L
IMMATURE GRANULOCYTES %: 1.1 % (ref 0–5)
LAB: ABNORMAL
LACTIC ACID: 0.9 MMOL/L (ref 0.5–2.2)
LYMPHOCYTES ABSOLUTE: 1.43 E9/L (ref 1.5–4)
LYMPHOCYTES RELATIVE PERCENT: 19.4 % (ref 20–42)
Lab: 620
MAGNESIUM: 2.2 MG/DL (ref 1.6–2.6)
MCH RBC QN AUTO: 30 PG (ref 26–35)
MCH RBC QN AUTO: 30.5 PG (ref 26–35)
MCHC RBC AUTO-ENTMCNC: 31.7 % (ref 32–34.5)
MCHC RBC AUTO-ENTMCNC: 32.7 % (ref 32–34.5)
MCV RBC AUTO: 91.9 FL (ref 80–99.9)
MCV RBC AUTO: 96.2 FL (ref 80–99.9)
METER GLUCOSE: 103 MG/DL (ref 70–110)
METER GLUCOSE: 120 MG/DL (ref 70–110)
METER GLUCOSE: 138 MG/DL (ref 70–110)
METER GLUCOSE: 138 MG/DL (ref 70–110)
METER GLUCOSE: 155 MG/DL (ref 70–110)
METER GLUCOSE: 157 MG/DL (ref 70–110)
METER GLUCOSE: 180 MG/DL (ref 70–110)
METHB: 0.3 % (ref 0–1.5)
MODE: AC
MONOCYTES ABSOLUTE: 0.79 E9/L (ref 0.1–0.95)
MONOCYTES RELATIVE PERCENT: 10.7 % (ref 2–12)
NEUTROPHILS ABSOLUTE: 4.99 E9/L (ref 1.8–7.3)
NEUTROPHILS RELATIVE PERCENT: 67.7 % (ref 43–80)
O2 CONTENT: 9.7 ML/DL
O2 SATURATION: 99.4 % (ref 92–98.5)
O2HB: 96.8 % (ref 94–97)
OPERATOR ID: ABNORMAL
PATIENT TEMP: 37 C
PCO2: 44.8 MMHG (ref 35–45)
PDW BLD-RTO: 14.4 FL (ref 11.5–15)
PDW BLD-RTO: 15.2 FL (ref 11.5–15)
PEEP/CPAP: 5 CMH?O
PFO2: 3.23 MMHG/%
PH BLOOD GAS: 7.42 (ref 7.35–7.45)
PHOSPHORUS: 3.1 MG/DL (ref 2.5–4.5)
PLATELET # BLD: 184 E9/L (ref 130–450)
PLATELET # BLD: 185 E9/L (ref 130–450)
PMV BLD AUTO: 9.4 FL (ref 7–12)
PMV BLD AUTO: 9.5 FL (ref 7–12)
PO2: 129.4 MMHG (ref 60–100)
POTASSIUM REFLEX MAGNESIUM: 4.2 MMOL/L (ref 3.5–5)
POTASSIUM SERPL-SCNC: 4.2 MMOL/L (ref 3.5–5)
RBC # BLD: 2.1 E12/L (ref 3.8–5.8)
RBC # BLD: 2.83 E12/L (ref 3.8–5.8)
RI(T): 73 %
RR MECHANICAL: 14 B/MIN
SODIUM BLD-SCNC: 136 MMOL/L (ref 132–146)
SODIUM BLD-SCNC: 139 MMOL/L (ref 132–146)
SOURCE, BLOOD GAS: ABNORMAL
THB: 6.9 G/DL (ref 11.5–16.5)
TIME ANALYZED: 623
TOTAL PROTEIN: 5.5 G/DL (ref 6.4–8.3)
VT MECHANICAL: 550 ML
WBC # BLD: 7.4 E9/L (ref 4.5–11.5)
WBC # BLD: 8.6 E9/L (ref 4.5–11.5)

## 2018-04-12 PROCEDURE — 31500 INSERT EMERGENCY AIRWAY: CPT | Performed by: ANESTHESIOLOGY

## 2018-04-12 PROCEDURE — 93010 ELECTROCARDIOGRAM REPORT: CPT | Performed by: INTERNAL MEDICINE

## 2018-04-12 PROCEDURE — 86923 COMPATIBILITY TEST ELECTRIC: CPT

## 2018-04-12 PROCEDURE — 3600000015 HC SURGERY LEVEL 5 ADDTL 15MIN: Performed by: ORTHOPAEDIC SURGERY

## 2018-04-12 PROCEDURE — 87081 CULTURE SCREEN ONLY: CPT

## 2018-04-12 PROCEDURE — 76000 FLUOROSCOPY <1 HR PHYS/QHP: CPT

## 2018-04-12 PROCEDURE — 6360000002 HC RX W HCPCS

## 2018-04-12 PROCEDURE — P9016 RBC LEUKOCYTES REDUCED: HCPCS

## 2018-04-12 PROCEDURE — 36415 COLL VENOUS BLD VENIPUNCTURE: CPT

## 2018-04-12 PROCEDURE — 0QPHX5Z REMOVAL OF EXTERNAL FIXATION DEVICE FROM LEFT TIBIA, EXTERNAL APPROACH: ICD-10-PCS | Performed by: ORTHOPAEDIC SURGERY

## 2018-04-12 PROCEDURE — 6370000000 HC RX 637 (ALT 250 FOR IP): Performed by: STUDENT IN AN ORGANIZED HEALTH CARE EDUCATION/TRAINING PROGRAM

## 2018-04-12 PROCEDURE — 71045 X-RAY EXAM CHEST 1 VIEW: CPT

## 2018-04-12 PROCEDURE — 2580000003 HC RX 258: Performed by: STUDENT IN AN ORGANIZED HEALTH CARE EDUCATION/TRAINING PROGRAM

## 2018-04-12 PROCEDURE — 94002 VENT MGMT INPAT INIT DAY: CPT

## 2018-04-12 PROCEDURE — 27540 TREAT KNEE FRACTURE: CPT | Performed by: ORTHOPAEDIC SURGERY

## 2018-04-12 PROCEDURE — 73562 X-RAY EXAM OF KNEE 3: CPT

## 2018-04-12 PROCEDURE — 2500000003 HC RX 250 WO HCPCS

## 2018-04-12 PROCEDURE — 73000 X-RAY EXAM OF COLLAR BONE: CPT

## 2018-04-12 PROCEDURE — 36556 INSERT NON-TUNNEL CV CATH: CPT

## 2018-04-12 PROCEDURE — 2000000000 HC ICU R&B

## 2018-04-12 PROCEDURE — 0PSB04Z REPOSITION LEFT CLAVICLE WITH INTERNAL FIXATION DEVICE, OPEN APPROACH: ICD-10-PCS | Performed by: ORTHOPAEDIC SURGERY

## 2018-04-12 PROCEDURE — 6360000002 HC RX W HCPCS: Performed by: NURSE ANESTHETIST, CERTIFIED REGISTERED

## 2018-04-12 PROCEDURE — 6360000002 HC RX W HCPCS: Performed by: STUDENT IN AN ORGANIZED HEALTH CARE EDUCATION/TRAINING PROGRAM

## 2018-04-12 PROCEDURE — 3700000001 HC ADD 15 MINUTES (ANESTHESIA): Performed by: ORTHOPAEDIC SURGERY

## 2018-04-12 PROCEDURE — 82962 GLUCOSE BLOOD TEST: CPT

## 2018-04-12 PROCEDURE — 0QSH04Z REPOSITION LEFT TIBIA WITH INTERNAL FIXATION DEVICE, OPEN APPROACH: ICD-10-PCS | Performed by: ORTHOPAEDIC SURGERY

## 2018-04-12 PROCEDURE — 23515 OPTX CLAVICULAR FX W/INT FIX: CPT | Performed by: ORTHOPAEDIC SURGERY

## 2018-04-12 PROCEDURE — 36430 TRANSFUSION BLD/BLD COMPNT: CPT

## 2018-04-12 PROCEDURE — C1713 ANCHOR/SCREW BN/BN,TIS/BN: HCPCS | Performed by: ORTHOPAEDIC SURGERY

## 2018-04-12 PROCEDURE — 84100 ASSAY OF PHOSPHORUS: CPT

## 2018-04-12 PROCEDURE — 85025 COMPLETE CBC W/AUTO DIFF WBC: CPT

## 2018-04-12 PROCEDURE — 93005 ELECTROCARDIOGRAM TRACING: CPT | Performed by: STUDENT IN AN ORGANIZED HEALTH CARE EDUCATION/TRAINING PROGRAM

## 2018-04-12 PROCEDURE — 80053 COMPREHEN METABOLIC PANEL: CPT

## 2018-04-12 PROCEDURE — 2500000003 HC RX 250 WO HCPCS: Performed by: STUDENT IN AN ORGANIZED HEALTH CARE EDUCATION/TRAINING PROGRAM

## 2018-04-12 PROCEDURE — 27536 TREAT KNEE FRACTURE: CPT | Performed by: ORTHOPAEDIC SURGERY

## 2018-04-12 PROCEDURE — 73590 X-RAY EXAM OF LOWER LEG: CPT

## 2018-04-12 PROCEDURE — 6370000000 HC RX 637 (ALT 250 FOR IP): Performed by: NURSE PRACTITIONER

## 2018-04-12 PROCEDURE — 0BH17EZ INSERTION OF ENDOTRACHEAL AIRWAY INTO TRACHEA, VIA NATURAL OR ARTIFICIAL OPENING: ICD-10-PCS | Performed by: SURGERY

## 2018-04-12 PROCEDURE — 82330 ASSAY OF CALCIUM: CPT

## 2018-04-12 PROCEDURE — 2580000003 HC RX 258

## 2018-04-12 PROCEDURE — 5A1955Z RESPIRATORY VENTILATION, GREATER THAN 96 CONSECUTIVE HOURS: ICD-10-PCS | Performed by: SURGERY

## 2018-04-12 PROCEDURE — 85027 COMPLETE CBC AUTOMATED: CPT

## 2018-04-12 PROCEDURE — 31500 INSERT EMERGENCY AIRWAY: CPT

## 2018-04-12 PROCEDURE — 27758 TREATMENT OF TIBIA FRACTURE: CPT | Performed by: ORTHOPAEDIC SURGERY

## 2018-04-12 PROCEDURE — 82805 BLOOD GASES W/O2 SATURATION: CPT

## 2018-04-12 PROCEDURE — 3700000000 HC ANESTHESIA ATTENDED CARE: Performed by: ORTHOPAEDIC SURGERY

## 2018-04-12 PROCEDURE — C1769 GUIDE WIRE: HCPCS | Performed by: ORTHOPAEDIC SURGERY

## 2018-04-12 PROCEDURE — 2500000003 HC RX 250 WO HCPCS: Performed by: ORTHOPAEDIC SURGERY

## 2018-04-12 PROCEDURE — 83735 ASSAY OF MAGNESIUM: CPT

## 2018-04-12 PROCEDURE — 20694 RMVL EXT FIXJ SYS UNDER ANES: CPT | Performed by: ORTHOPAEDIC SURGERY

## 2018-04-12 PROCEDURE — C9113 INJ PANTOPRAZOLE SODIUM, VIA: HCPCS | Performed by: STUDENT IN AN ORGANIZED HEALTH CARE EDUCATION/TRAINING PROGRAM

## 2018-04-12 PROCEDURE — 2720000010 HC SURG SUPPLY STERILE: Performed by: ORTHOPAEDIC SURGERY

## 2018-04-12 PROCEDURE — 80048 BASIC METABOLIC PNL TOTAL CA: CPT

## 2018-04-12 PROCEDURE — 83605 ASSAY OF LACTIC ACID: CPT

## 2018-04-12 PROCEDURE — 3600000005 HC SURGERY LEVEL 5 BASE: Performed by: ORTHOPAEDIC SURGERY

## 2018-04-12 DEVICE — IMPLANTABLE DEVICE: Type: IMPLANTABLE DEVICE | Status: FUNCTIONAL

## 2018-04-12 DEVICE — SCREW BNE L26MM DIA4.5MM PROX CORT TIB S STL ST LOK FULL: Type: IMPLANTABLE DEVICE | Status: FUNCTIONAL

## 2018-04-12 DEVICE — SCREW BNE L34MM DIA4.5MM PROX CORT TIB S STL ST LOK FULL: Type: IMPLANTABLE DEVICE | Status: FUNCTIONAL

## 2018-04-12 DEVICE — SCREW BNE L40MM DIA4.5MM PROX CORT TIB S STL ST LOK FULL: Type: IMPLANTABLE DEVICE | Status: FUNCTIONAL

## 2018-04-12 DEVICE — SCREW BNE L80MM DIA5MM S STL ST LOK FULL THRD T25 STARDRV: Type: IMPLANTABLE DEVICE | Status: FUNCTIONAL

## 2018-04-12 DEVICE — SCREW BNE L50MM DIA4.5MM PROX CORT TIB S STL ST LOK FULL: Type: IMPLANTABLE DEVICE | Status: FUNCTIONAL

## 2018-04-12 DEVICE — SCREW BNE L14MM DIA3.5MM CORT S STL ST NONCANNULATED LOK: Type: IMPLANTABLE DEVICE | Status: FUNCTIONAL

## 2018-04-12 DEVICE — SCREW CORTCL SLFTP FTHRD 2.7X24MM: Type: IMPLANTABLE DEVICE | Status: FUNCTIONAL

## 2018-04-12 DEVICE — SCREW BNE L85MM DIA5MM S STL ST LOK FULL THRD T25 STARDRV: Type: IMPLANTABLE DEVICE | Status: FUNCTIONAL

## 2018-04-12 DEVICE — SCREW BNE L44MM DIA3.5MM CORT S STL ST NONCANNULATED LOK: Type: IMPLANTABLE DEVICE | Status: FUNCTIONAL

## 2018-04-12 DEVICE — SCREW BNE L16MM DIA3.5MM CORT S STL ST NONCANNULATED LOK: Type: IMPLANTABLE DEVICE | Status: FUNCTIONAL

## 2018-04-12 DEVICE — SCREW CORTX SLFTP FTHRD 3.5X18MM: Type: IMPLANTABLE DEVICE | Status: FUNCTIONAL

## 2018-04-12 DEVICE — SCREW BNE L90MM DIA5MM S STL ST LOK FULL THRD T25 STARDRV: Type: IMPLANTABLE DEVICE | Status: FUNCTIONAL

## 2018-04-12 DEVICE — SCREW BNE L70MM DIA5MM S STL ST LOK FULL THRD T25 STARDRV: Type: IMPLANTABLE DEVICE | Status: FUNCTIONAL

## 2018-04-12 RX ORDER — VECURONIUM BROMIDE 1 MG/ML
INJECTION, POWDER, LYOPHILIZED, FOR SOLUTION INTRAVENOUS
Status: COMPLETED
Start: 2018-04-12 | End: 2018-04-12

## 2018-04-12 RX ORDER — MORPHINE SULFATE 2 MG/ML
2 INJECTION, SOLUTION INTRAMUSCULAR; INTRAVENOUS
Status: DISCONTINUED | OUTPATIENT
Start: 2018-04-12 | End: 2018-04-16

## 2018-04-12 RX ORDER — PROPOFOL 10 MG/ML
10 INJECTION, EMULSION INTRAVENOUS
Status: DISCONTINUED | OUTPATIENT
Start: 2018-04-12 | End: 2018-04-16

## 2018-04-12 RX ORDER — VECURONIUM BROMIDE 1 MG/ML
10 INJECTION, POWDER, LYOPHILIZED, FOR SOLUTION INTRAVENOUS
Status: COMPLETED | OUTPATIENT
Start: 2018-04-12 | End: 2018-04-12

## 2018-04-12 RX ORDER — SODIUM CHLORIDE 9 MG/ML
INJECTION, SOLUTION INTRAVENOUS CONTINUOUS
Status: DISCONTINUED | OUTPATIENT
Start: 2018-04-12 | End: 2018-04-12

## 2018-04-12 RX ORDER — BUPIVACAINE HYDROCHLORIDE AND EPINEPHRINE 5; 5 MG/ML; UG/ML
INJECTION, SOLUTION EPIDURAL; INTRACAUDAL; PERINEURAL PRN
Status: DISCONTINUED | OUTPATIENT
Start: 2018-04-12 | End: 2018-04-12 | Stop reason: HOSPADM

## 2018-04-12 RX ORDER — NICOTINE POLACRILEX 4 MG
15 LOZENGE BUCCAL PRN
Status: DISCONTINUED | OUTPATIENT
Start: 2018-04-12 | End: 2018-04-27 | Stop reason: HOSPADM

## 2018-04-12 RX ORDER — 0.9 % SODIUM CHLORIDE 0.9 %
250 INTRAVENOUS SOLUTION INTRAVENOUS ONCE
Status: COMPLETED | OUTPATIENT
Start: 2018-04-12 | End: 2018-04-13

## 2018-04-12 RX ORDER — CHLORHEXIDINE GLUCONATE 0.12 MG/ML
15 RINSE ORAL 2 TIMES DAILY
Status: DISCONTINUED | OUTPATIENT
Start: 2018-04-12 | End: 2018-04-23

## 2018-04-12 RX ORDER — SODIUM CHLORIDE 0.9 % (FLUSH) 0.9 %
10 SYRINGE (ML) INJECTION PRN
Status: DISCONTINUED | OUTPATIENT
Start: 2018-04-12 | End: 2018-04-27 | Stop reason: HOSPADM

## 2018-04-12 RX ORDER — SUCCINYLCHOLINE CHLORIDE 20 MG/ML
INJECTION INTRAMUSCULAR; INTRAVENOUS
Status: COMPLETED
Start: 2018-04-12 | End: 2018-04-11

## 2018-04-12 RX ORDER — LORAZEPAM 2 MG/ML
INJECTION INTRAMUSCULAR
Status: COMPLETED
Start: 2018-04-12 | End: 2018-04-12

## 2018-04-12 RX ORDER — PROPOFOL 10 MG/ML
10 INJECTION, EMULSION INTRAVENOUS
Status: DISCONTINUED | OUTPATIENT
Start: 2018-04-12 | End: 2018-04-12 | Stop reason: CLARIF

## 2018-04-12 RX ORDER — SODIUM CHLORIDE 0.9 % (FLUSH) 0.9 %
10 SYRINGE (ML) INJECTION EVERY 12 HOURS SCHEDULED
Status: DISCONTINUED | OUTPATIENT
Start: 2018-04-12 | End: 2018-04-24 | Stop reason: SDUPTHER

## 2018-04-12 RX ORDER — FENTANYL CITRATE 50 UG/ML
INJECTION, SOLUTION INTRAMUSCULAR; INTRAVENOUS PRN
Status: DISCONTINUED | OUTPATIENT
Start: 2018-04-12 | End: 2018-04-12 | Stop reason: SDUPTHER

## 2018-04-12 RX ORDER — MINERAL OIL AND WHITE PETROLATUM 150; 830 MG/G; MG/G
OINTMENT OPHTHALMIC
Status: DISCONTINUED | OUTPATIENT
Start: 2018-04-12 | End: 2018-04-26

## 2018-04-12 RX ORDER — DEXAMETHASONE SODIUM PHOSPHATE 10 MG/ML
INJECTION, SOLUTION INTRAMUSCULAR; INTRAVENOUS PRN
Status: DISCONTINUED | OUTPATIENT
Start: 2018-04-12 | End: 2018-04-12 | Stop reason: SDUPTHER

## 2018-04-12 RX ORDER — DEXTROSE MONOHYDRATE 50 MG/ML
100 INJECTION, SOLUTION INTRAVENOUS PRN
Status: DISCONTINUED | OUTPATIENT
Start: 2018-04-12 | End: 2018-04-27 | Stop reason: HOSPADM

## 2018-04-12 RX ORDER — PROPOFOL 10 MG/ML
INJECTION, EMULSION INTRAVENOUS
Status: COMPLETED
Start: 2018-04-12 | End: 2018-04-11

## 2018-04-12 RX ORDER — ACETAMINOPHEN 325 MG/1
650 TABLET ORAL EVERY 6 HOURS PRN
Status: DISCONTINUED | OUTPATIENT
Start: 2018-04-12 | End: 2018-04-19 | Stop reason: SDUPTHER

## 2018-04-12 RX ORDER — SUCCINYLCHOLINE CHLORIDE 20 MG/ML
INJECTION INTRAMUSCULAR; INTRAVENOUS PRN
Status: DISCONTINUED | OUTPATIENT
Start: 2018-04-11 | End: 2018-04-12 | Stop reason: SDUPTHER

## 2018-04-12 RX ORDER — 0.9 % SODIUM CHLORIDE 0.9 %
10 VIAL (ML) INJECTION DAILY
Status: DISCONTINUED | OUTPATIENT
Start: 2018-04-12 | End: 2018-04-16

## 2018-04-12 RX ORDER — OXYCODONE HCL 5 MG/5 ML
5 SOLUTION, ORAL ORAL EVERY 4 HOURS PRN
Status: DISCONTINUED | OUTPATIENT
Start: 2018-04-12 | End: 2018-04-20

## 2018-04-12 RX ORDER — PHENOBARBITAL SODIUM 130 MG/ML
260 INJECTION INTRAMUSCULAR EVERY 6 HOURS SCHEDULED
Status: COMPLETED | OUTPATIENT
Start: 2018-04-12 | End: 2018-04-13

## 2018-04-12 RX ORDER — ONDANSETRON 2 MG/ML
INJECTION INTRAMUSCULAR; INTRAVENOUS PRN
Status: DISCONTINUED | OUTPATIENT
Start: 2018-04-12 | End: 2018-04-12 | Stop reason: SDUPTHER

## 2018-04-12 RX ORDER — SUCCINYLCHOLINE CHLORIDE 20 MG/ML
200 INJECTION INTRAMUSCULAR; INTRAVENOUS ONCE
Status: COMPLETED | OUTPATIENT
Start: 2018-04-12 | End: 2018-04-12

## 2018-04-12 RX ORDER — PANTOPRAZOLE SODIUM 40 MG/10ML
40 INJECTION, POWDER, LYOPHILIZED, FOR SOLUTION INTRAVENOUS DAILY
Status: DISCONTINUED | OUTPATIENT
Start: 2018-04-12 | End: 2018-04-16

## 2018-04-12 RX ORDER — PHENOBARBITAL SODIUM 130 MG/ML
130 INJECTION INTRAMUSCULAR EVERY 6 HOURS SCHEDULED
Status: DISCONTINUED | OUTPATIENT
Start: 2018-04-13 | End: 2018-04-16

## 2018-04-12 RX ORDER — LORAZEPAM 2 MG/ML
2 INJECTION INTRAMUSCULAR EVERY 4 HOURS
Status: DISCONTINUED | OUTPATIENT
Start: 2018-04-12 | End: 2018-04-15

## 2018-04-12 RX ORDER — DEXTROSE MONOHYDRATE 25 G/50ML
12.5 INJECTION, SOLUTION INTRAVENOUS PRN
Status: DISCONTINUED | OUTPATIENT
Start: 2018-04-12 | End: 2018-04-27 | Stop reason: HOSPADM

## 2018-04-12 RX ADMIN — PHENOBARBITAL SODIUM 260 MG: 130 INJECTION INTRAMUSCULAR; INTRAVENOUS at 18:18

## 2018-04-12 RX ADMIN — VECURONIUM BROMIDE 4 MG: 1 INJECTION, POWDER, LYOPHILIZED, FOR SOLUTION INTRAVENOUS at 14:55

## 2018-04-12 RX ADMIN — INSULIN LISPRO 1 UNITS: 100 INJECTION, SOLUTION INTRAVENOUS; SUBCUTANEOUS at 23:43

## 2018-04-12 RX ADMIN — INSULIN LISPRO 1 UNITS: 100 INJECTION, SOLUTION INTRAVENOUS; SUBCUTANEOUS at 01:48

## 2018-04-12 RX ADMIN — PHENOBARBITAL 130 MG: 32.4 TABLET ORAL at 09:09

## 2018-04-12 RX ADMIN — PROPOFOL 30 MCG/KG/MIN: 10 INJECTION, EMULSION INTRAVENOUS at 04:59

## 2018-04-12 RX ADMIN — FENTANYL CITRATE 150 MCG: 50 INJECTION, SOLUTION INTRAMUSCULAR; INTRAVENOUS at 13:45

## 2018-04-12 RX ADMIN — LORAZEPAM 2 MG: 2 INJECTION INTRAMUSCULAR; INTRAVENOUS at 16:42

## 2018-04-12 RX ADMIN — Medication 10 ML: at 20:52

## 2018-04-12 RX ADMIN — BISACODYL 10 MG: 10 SUPPOSITORY RECTAL at 09:09

## 2018-04-12 RX ADMIN — FENTANYL CITRATE 50 MCG: 50 INJECTION, SOLUTION INTRAMUSCULAR; INTRAVENOUS at 14:45

## 2018-04-12 RX ADMIN — PROPOFOL 40 MCG/KG/MIN: 10 INJECTION, EMULSION INTRAVENOUS at 09:13

## 2018-04-12 RX ADMIN — SODIUM CHLORIDE: 9 INJECTION, SOLUTION INTRAVENOUS at 16:38

## 2018-04-12 RX ADMIN — LORAZEPAM 2 MG: 2 INJECTION INTRAMUSCULAR; INTRAVENOUS at 05:03

## 2018-04-12 RX ADMIN — INSULIN LISPRO 1 UNITS: 100 INJECTION, SOLUTION INTRAVENOUS; SUBCUTANEOUS at 19:58

## 2018-04-12 RX ADMIN — METHOCARBAMOL 1000 MG: 500 TABLET ORAL at 16:39

## 2018-04-12 RX ADMIN — ACETAMINOPHEN 500 MG: 500 TABLET, COATED ORAL at 16:33

## 2018-04-12 RX ADMIN — ACETAMINOPHEN 500 MG: 500 TABLET, COATED ORAL at 09:20

## 2018-04-12 RX ADMIN — DOCUSATE SODIUM 100 MG: 100 CAPSULE, LIQUID FILLED ORAL at 20:00

## 2018-04-12 RX ADMIN — PREGABALIN 75 MG: 75 CAPSULE ORAL at 20:00

## 2018-04-12 RX ADMIN — Medication 10 ML: at 09:10

## 2018-04-12 RX ADMIN — STANDARDIZED SENNA CONCENTRATE AND DOCUSATE SODIUM 1 TABLET: 8.6; 5 TABLET, FILM COATED ORAL at 09:21

## 2018-04-12 RX ADMIN — CEFAZOLIN SODIUM 2 G: 2 SOLUTION INTRAVENOUS at 20:01

## 2018-04-12 RX ADMIN — DEXAMETHASONE SODIUM PHOSPHATE 10 MG: 10 INJECTION, SOLUTION INTRAMUSCULAR; INTRAVENOUS at 14:55

## 2018-04-12 RX ADMIN — PREGABALIN 75 MG: 75 CAPSULE ORAL at 09:21

## 2018-04-12 RX ADMIN — PROPOFOL 30 MCG/KG/MIN: 10 INJECTION, EMULSION INTRAVENOUS at 23:52

## 2018-04-12 RX ADMIN — FENTANYL CITRATE 50 MCG: 50 INJECTION, SOLUTION INTRAMUSCULAR; INTRAVENOUS at 14:40

## 2018-04-12 RX ADMIN — WATER 10 ML: 1 INJECTION INTRAMUSCULAR; INTRAVENOUS; SUBCUTANEOUS at 01:00

## 2018-04-12 RX ADMIN — PROPOFOL 30 MCG/KG/MIN: 10 INJECTION, EMULSION INTRAVENOUS at 01:36

## 2018-04-12 RX ADMIN — ENOXAPARIN SODIUM 30 MG: 30 INJECTION SUBCUTANEOUS at 20:01

## 2018-04-12 RX ADMIN — CHLORHEXIDINE GLUCONATE 15 ML: 1.2 RINSE ORAL at 09:09

## 2018-04-12 RX ADMIN — Medication 2 MG: at 00:21

## 2018-04-12 RX ADMIN — ACETAMINOPHEN 500 MG: 500 TABLET, COATED ORAL at 04:28

## 2018-04-12 RX ADMIN — FENTANYL CITRATE 100 MCG: 50 INJECTION, SOLUTION INTRAMUSCULAR; INTRAVENOUS at 13:06

## 2018-04-12 RX ADMIN — PHENOBARBITAL SODIUM 260 MG: 130 INJECTION INTRAMUSCULAR; INTRAVENOUS at 11:28

## 2018-04-12 RX ADMIN — PANTOPRAZOLE SODIUM 40 MG: 40 INJECTION, POWDER, FOR SOLUTION INTRAVENOUS at 09:06

## 2018-04-12 RX ADMIN — ACETAMINOPHEN 500 MG: 500 TABLET, COATED ORAL at 20:01

## 2018-04-12 RX ADMIN — VECURONIUM BROMIDE 10 MG: 1 INJECTION, POWDER, LYOPHILIZED, FOR SOLUTION INTRAVENOUS at 13:06

## 2018-04-12 RX ADMIN — PHENOBARBITAL SODIUM 260 MG: 130 INJECTION INTRAMUSCULAR; INTRAVENOUS at 23:43

## 2018-04-12 RX ADMIN — MORPHINE SULFATE 2 MG: 2 INJECTION, SOLUTION INTRAMUSCULAR; INTRAVENOUS at 10:52

## 2018-04-12 RX ADMIN — ONDANSETRON 4 MG: 2 INJECTION INTRAMUSCULAR; INTRAVENOUS at 14:55

## 2018-04-12 RX ADMIN — LORAZEPAM 2 MG: 2 INJECTION INTRAMUSCULAR; INTRAVENOUS at 20:02

## 2018-04-12 RX ADMIN — CEFAZOLIN SODIUM 2 G: 2 SOLUTION INTRAVENOUS at 13:04

## 2018-04-12 RX ADMIN — CHLORHEXIDINE GLUCONATE 15 ML: 1.2 RINSE ORAL at 01:41

## 2018-04-12 RX ADMIN — METHOCARBAMOL 1000 MG: 500 TABLET ORAL at 09:21

## 2018-04-12 RX ADMIN — Medication 10 ML: at 20:53

## 2018-04-12 RX ADMIN — Medication 10 ML: at 09:16

## 2018-04-12 RX ADMIN — PRAVASTATIN SODIUM 20 MG: 20 TABLET ORAL at 20:52

## 2018-04-12 RX ADMIN — SODIUM CHLORIDE: 9 INJECTION, SOLUTION INTRAVENOUS at 01:11

## 2018-04-12 RX ADMIN — MULTIPLE VITAMINS W/ MINERALS TAB 1 TABLET: TAB at 09:20

## 2018-04-12 RX ADMIN — LORAZEPAM 2 MG: 2 INJECTION INTRAMUSCULAR; INTRAVENOUS at 09:05

## 2018-04-12 RX ADMIN — VECURONIUM BROMIDE 10 MG: 1 INJECTION, POWDER, LYOPHILIZED, FOR SOLUTION INTRAVENOUS at 01:00

## 2018-04-12 RX ADMIN — METHOCARBAMOL 1000 MG: 500 TABLET ORAL at 20:52

## 2018-04-12 RX ADMIN — SODIUM CHLORIDE 250 ML: 9 INJECTION, SOLUTION INTRAVENOUS at 11:36

## 2018-04-12 RX ADMIN — ENOXAPARIN SODIUM 30 MG: 30 INJECTION SUBCUTANEOUS at 09:06

## 2018-04-12 RX ADMIN — Medication 4 MG: at 00:05

## 2018-04-12 RX ADMIN — SUCCINYLCHOLINE CHLORIDE 200 MG: 20 INJECTION, SOLUTION INTRAMUSCULAR; INTRAVENOUS at 00:45

## 2018-04-12 RX ADMIN — CARVEDILOL 25 MG: 25 TABLET, FILM COATED ORAL at 16:39

## 2018-04-12 RX ADMIN — CHLORHEXIDINE GLUCONATE 15 ML: 1.2 RINSE ORAL at 20:01

## 2018-04-12 ASSESSMENT — PULMONARY FUNCTION TESTS
PIF_VALUE: 22
PIF_VALUE: 21
PIF_VALUE: 24
PIF_VALUE: 21
PIF_VALUE: 24
PIF_VALUE: 24
PIF_VALUE: 25
PIF_VALUE: 24
PIF_VALUE: 23
PIF_VALUE: 21
PIF_VALUE: 20
PIF_VALUE: 23
PIF_VALUE: 23
PIF_VALUE: 2
PIF_VALUE: 20
PIF_VALUE: 24
PIF_VALUE: 23
PIF_VALUE: 21
PIF_VALUE: 21
PIF_VALUE: 22
PIF_VALUE: 24
PIF_VALUE: 23
PIF_VALUE: 3
PIF_VALUE: 22
PIF_VALUE: 21
PIF_VALUE: 22
PIF_VALUE: 25
PIF_VALUE: 22
PIF_VALUE: 21
PIF_VALUE: 22
PIF_VALUE: 24
PIF_VALUE: 24
PIF_VALUE: 23
PIF_VALUE: 22
PIF_VALUE: 22
PIF_VALUE: 24
PIF_VALUE: 22
PIF_VALUE: 23
PIF_VALUE: 22
PIF_VALUE: 23
PIF_VALUE: 25
PIF_VALUE: 22
PIF_VALUE: 22
PIF_VALUE: 21
PIF_VALUE: 24
PIF_VALUE: 3
PIF_VALUE: 25
PIF_VALUE: 22
PIF_VALUE: 25
PIF_VALUE: 24
PIF_VALUE: 23
PIF_VALUE: 22
PIF_VALUE: 21
PIF_VALUE: 24
PIF_VALUE: 24
PIF_VALUE: 27
PIF_VALUE: 24
PIF_VALUE: 24
PIF_VALUE: 21
PIF_VALUE: 22
PIF_VALUE: 24
PIF_VALUE: 21
PIF_VALUE: 22
PIF_VALUE: 23
PIF_VALUE: 25
PIF_VALUE: 22
PIF_VALUE: 24
PIF_VALUE: 21
PIF_VALUE: 22
PIF_VALUE: 22
PIF_VALUE: 24
PIF_VALUE: 25
PIF_VALUE: 24
PIF_VALUE: 24
PIF_VALUE: 21
PIF_VALUE: 22
PIF_VALUE: 24
PIF_VALUE: 24
PIF_VALUE: 21
PIF_VALUE: 24
PIF_VALUE: 23
PIF_VALUE: 24
PIF_VALUE: 24
PIF_VALUE: 23
PIF_VALUE: 22
PIF_VALUE: 21
PIF_VALUE: 22
PIF_VALUE: 23
PIF_VALUE: 25
PIF_VALUE: 20
PIF_VALUE: 22
PIF_VALUE: 23
PIF_VALUE: 22
PIF_VALUE: 24
PIF_VALUE: 21
PIF_VALUE: 24
PIF_VALUE: 25
PIF_VALUE: 22
PIF_VALUE: 23
PIF_VALUE: 22
PIF_VALUE: 3
PIF_VALUE: 24
PIF_VALUE: 22
PIF_VALUE: 22
PIF_VALUE: 3
PIF_VALUE: 22
PIF_VALUE: 24
PIF_VALUE: 23
PIF_VALUE: 25
PIF_VALUE: 24
PIF_VALUE: 24
PIF_VALUE: 22
PIF_VALUE: 23
PIF_VALUE: 22
PIF_VALUE: 24
PIF_VALUE: 22
PIF_VALUE: 24
PIF_VALUE: 25
PIF_VALUE: 21
PIF_VALUE: 22
PIF_VALUE: 22
PIF_VALUE: 21
PIF_VALUE: 21
PIF_VALUE: 3
PIF_VALUE: 25
PIF_VALUE: 24
PIF_VALUE: 24
PIF_VALUE: 25
PIF_VALUE: 22
PIF_VALUE: 25
PIF_VALUE: 22
PIF_VALUE: 24
PIF_VALUE: 20
PIF_VALUE: 3
PIF_VALUE: 3
PIF_VALUE: 22
PIF_VALUE: 25
PIF_VALUE: 1
PIF_VALUE: 25
PIF_VALUE: 22
PIF_VALUE: 23
PIF_VALUE: 22

## 2018-04-12 ASSESSMENT — PAIN SCALES - GENERAL
PAINLEVEL_OUTOF10: 10
PAINLEVEL_OUTOF10: 0

## 2018-04-13 ENCOUNTER — APPOINTMENT (OUTPATIENT)
Dept: GENERAL RADIOLOGY | Age: 53
DRG: 003 | End: 2018-04-13
Payer: MEDICARE

## 2018-04-13 PROBLEM — J96.01 ACUTE RESPIRATORY FAILURE WITH HYPOXIA AND HYPERCAPNIA (HCC): Status: ACTIVE | Noted: 2018-04-13

## 2018-04-13 PROBLEM — J96.02 ACUTE RESPIRATORY FAILURE WITH HYPOXIA AND HYPERCAPNIA (HCC): Status: ACTIVE | Noted: 2018-04-13

## 2018-04-13 LAB
AADO2: 116.7 MMHG
ALBUMIN SERPL-MCNC: 2.6 G/DL (ref 3.5–5.2)
ALP BLD-CCNC: 50 U/L (ref 40–129)
ALT SERPL-CCNC: 28 U/L (ref 0–40)
ANION GAP SERPL CALCULATED.3IONS-SCNC: 13 MMOL/L (ref 7–16)
AST SERPL-CCNC: 40 U/L (ref 0–39)
B.E.: 2.2 MMOL/L (ref -3–3)
BASOPHILIC STIPPLING: ABNORMAL
BASOPHILS ABSOLUTE: 0 E9/L (ref 0–0.2)
BASOPHILS RELATIVE PERCENT: 0.1 % (ref 0–2)
BILIRUB SERPL-MCNC: 0.5 MG/DL (ref 0–1.2)
BUN BLDV-MCNC: 11 MG/DL (ref 6–20)
CALCIUM IONIZED: 1.19 MMOL/L (ref 1.15–1.33)
CALCIUM SERPL-MCNC: 7.7 MG/DL (ref 8.6–10.2)
CHLORIDE BLD-SCNC: 98 MMOL/L (ref 98–107)
CO2: 26 MMOL/L (ref 22–29)
COHB: 0.2 % (ref 0–1.5)
CREAT SERPL-MCNC: 0.7 MG/DL (ref 0.7–1.2)
CRITICAL: ABNORMAL
DATE ANALYZED: ABNORMAL
DATE OF COLLECTION: ABNORMAL
EOSINOPHILS ABSOLUTE: 0 E9/L (ref 0.05–0.5)
EOSINOPHILS RELATIVE PERCENT: 0 % (ref 0–6)
FIO2: 40 %
GFR AFRICAN AMERICAN: >60
GFR NON-AFRICAN AMERICAN: >60 ML/MIN/1.73
GLUCOSE BLD-MCNC: 135 MG/DL (ref 74–109)
HCO3: 26.1 MMOL/L (ref 22–26)
HCT VFR BLD CALC: 22.5 % (ref 37–54)
HEMOGLOBIN: 7.4 G/DL (ref 12.5–16.5)
HHB: 1.8 % (ref 0–5)
HYPOCHROMIA: ABNORMAL
LAB: ABNORMAL
LYMPHOCYTES ABSOLUTE: 0.44 E9/L (ref 1.5–4)
LYMPHOCYTES RELATIVE PERCENT: 6.1 % (ref 20–42)
Lab: 509
MAGNESIUM: 2.5 MG/DL (ref 1.6–2.6)
MCH RBC QN AUTO: 30.2 PG (ref 26–35)
MCHC RBC AUTO-ENTMCNC: 32.9 % (ref 32–34.5)
MCV RBC AUTO: 91.8 FL (ref 80–99.9)
METER GLUCOSE: 101 MG/DL (ref 70–110)
METER GLUCOSE: 128 MG/DL (ref 70–110)
METER GLUCOSE: 142 MG/DL (ref 70–110)
METER GLUCOSE: 149 MG/DL (ref 70–110)
METER GLUCOSE: 80 MG/DL (ref 70–110)
METHB: 0.3 % (ref 0–1.5)
MODE: AC
MONOCYTES ABSOLUTE: 0.44 E9/L (ref 0.1–0.95)
MONOCYTES RELATIVE PERCENT: 6.1 % (ref 2–12)
MRSA CULTURE ONLY: NORMAL
MYELOCYTE PERCENT: 0.9 % (ref 0–0)
NEUTROPHILS ABSOLUTE: 6.51 E9/L (ref 1.8–7.3)
NEUTROPHILS RELATIVE PERCENT: 87 % (ref 43–80)
O2 CONTENT: 11.6 ML/DL
O2 SATURATION: 98.2 % (ref 92–98.5)
O2HB: 97.7 % (ref 94–97)
OPERATOR ID: 2464
PATIENT TEMP: 37 C
PCO2: 37.5 MMHG (ref 35–45)
PDW BLD-RTO: 15 FL (ref 11.5–15)
PEEP/CPAP: 5 CMH?O
PFO2: 2.88 MMHG/%
PH BLOOD GAS: 7.46 (ref 7.35–7.45)
PHOSPHORUS: 2.8 MG/DL (ref 2.5–4.5)
PLATELET # BLD: 193 E9/L (ref 130–450)
PMV BLD AUTO: 10.1 FL (ref 7–12)
PO2: 115.4 MMHG (ref 60–100)
POLYCHROMASIA: ABNORMAL
POTASSIUM REFLEX MAGNESIUM: 4.2 MMOL/L (ref 3.5–5)
RBC # BLD: 2.45 E12/L (ref 3.8–5.8)
RI(T): 101 %
RR MECHANICAL: 14 B/MIN
SODIUM BLD-SCNC: 137 MMOL/L (ref 132–146)
SOURCE, BLOOD GAS: ABNORMAL
TARGET CELLS: ABNORMAL
THB: 8.3 G/DL (ref 11.5–16.5)
TIME ANALYZED: 510
TOTAL PROTEIN: 5.3 G/DL (ref 6.4–8.3)
VT MECHANICAL: 550 ML
WBC # BLD: 7.4 E9/L (ref 4.5–11.5)

## 2018-04-13 PROCEDURE — 94003 VENT MGMT INPAT SUBQ DAY: CPT

## 2018-04-13 PROCEDURE — 83735 ASSAY OF MAGNESIUM: CPT

## 2018-04-13 PROCEDURE — 82330 ASSAY OF CALCIUM: CPT

## 2018-04-13 PROCEDURE — 71045 X-RAY EXAM CHEST 1 VIEW: CPT

## 2018-04-13 PROCEDURE — 6370000000 HC RX 637 (ALT 250 FOR IP): Performed by: STUDENT IN AN ORGANIZED HEALTH CARE EDUCATION/TRAINING PROGRAM

## 2018-04-13 PROCEDURE — 84100 ASSAY OF PHOSPHORUS: CPT

## 2018-04-13 PROCEDURE — 2580000003 HC RX 258: Performed by: STUDENT IN AN ORGANIZED HEALTH CARE EDUCATION/TRAINING PROGRAM

## 2018-04-13 PROCEDURE — 82805 BLOOD GASES W/O2 SATURATION: CPT

## 2018-04-13 PROCEDURE — C9113 INJ PANTOPRAZOLE SODIUM, VIA: HCPCS | Performed by: STUDENT IN AN ORGANIZED HEALTH CARE EDUCATION/TRAINING PROGRAM

## 2018-04-13 PROCEDURE — 85025 COMPLETE CBC W/AUTO DIFF WBC: CPT

## 2018-04-13 PROCEDURE — 2000000000 HC ICU R&B

## 2018-04-13 PROCEDURE — 6360000002 HC RX W HCPCS: Performed by: STUDENT IN AN ORGANIZED HEALTH CARE EDUCATION/TRAINING PROGRAM

## 2018-04-13 PROCEDURE — 82962 GLUCOSE BLOOD TEST: CPT

## 2018-04-13 PROCEDURE — 99291 CRITICAL CARE FIRST HOUR: CPT | Performed by: SURGERY

## 2018-04-13 PROCEDURE — 80053 COMPREHEN METABOLIC PANEL: CPT

## 2018-04-13 PROCEDURE — 36415 COLL VENOUS BLD VENIPUNCTURE: CPT

## 2018-04-13 RX ORDER — DOCUSATE SODIUM 50 MG/5ML
100 LIQUID ORAL 2 TIMES DAILY
Status: DISCONTINUED | OUTPATIENT
Start: 2018-04-13 | End: 2018-04-26

## 2018-04-13 RX ADMIN — PHENOBARBITAL SODIUM 130 MG: 130 INJECTION INTRAMUSCULAR; INTRAVENOUS at 18:31

## 2018-04-13 RX ADMIN — PROPOFOL 35 MCG/KG/MIN: 10 INJECTION, EMULSION INTRAVENOUS at 23:57

## 2018-04-13 RX ADMIN — Medication 10 ML: at 09:05

## 2018-04-13 RX ADMIN — PHENOBARBITAL SODIUM 130 MG: 130 INJECTION INTRAMUSCULAR; INTRAVENOUS at 11:59

## 2018-04-13 RX ADMIN — PHENOBARBITAL SODIUM 260 MG: 130 INJECTION INTRAMUSCULAR; INTRAVENOUS at 06:23

## 2018-04-13 RX ADMIN — METHOCARBAMOL 1000 MG: 500 TABLET ORAL at 20:26

## 2018-04-13 RX ADMIN — ACETAMINOPHEN 500 MG: 500 TABLET, COATED ORAL at 20:28

## 2018-04-13 RX ADMIN — PROPOFOL 40 MCG/KG/MIN: 10 INJECTION, EMULSION INTRAVENOUS at 10:42

## 2018-04-13 RX ADMIN — ACETAMINOPHEN 500 MG: 500 TABLET, COATED ORAL at 04:46

## 2018-04-13 RX ADMIN — DOCUSATE SODIUM 100 MG: 50 LIQUID ORAL at 09:09

## 2018-04-13 RX ADMIN — LORAZEPAM 2 MG: 2 INJECTION INTRAMUSCULAR; INTRAVENOUS at 16:33

## 2018-04-13 RX ADMIN — INSULIN LISPRO 1 UNITS: 100 INJECTION, SOLUTION INTRAVENOUS; SUBCUTANEOUS at 20:23

## 2018-04-13 RX ADMIN — PREGABALIN 75 MG: 75 CAPSULE ORAL at 09:04

## 2018-04-13 RX ADMIN — DOCUSATE SODIUM 100 MG: 50 LIQUID ORAL at 20:25

## 2018-04-13 RX ADMIN — PREGABALIN 75 MG: 75 CAPSULE ORAL at 20:28

## 2018-04-13 RX ADMIN — STANDARDIZED SENNA CONCENTRATE AND DOCUSATE SODIUM 1 TABLET: 8.6; 5 TABLET, FILM COATED ORAL at 09:02

## 2018-04-13 RX ADMIN — METHOCARBAMOL 1000 MG: 500 TABLET ORAL at 12:41

## 2018-04-13 RX ADMIN — POLYETHYLENE GLYCOL 3350 17 G: 17 POWDER, FOR SOLUTION ORAL at 09:03

## 2018-04-13 RX ADMIN — INSULIN LISPRO 1 UNITS: 100 INJECTION, SOLUTION INTRAVENOUS; SUBCUTANEOUS at 04:48

## 2018-04-13 RX ADMIN — PRAVASTATIN SODIUM 20 MG: 20 TABLET ORAL at 20:29

## 2018-04-13 RX ADMIN — METHOCARBAMOL 1000 MG: 500 TABLET ORAL at 16:28

## 2018-04-13 RX ADMIN — ACETAMINOPHEN 500 MG: 500 TABLET, COATED ORAL at 07:55

## 2018-04-13 RX ADMIN — CHLORHEXIDINE GLUCONATE 15 ML: 1.2 RINSE ORAL at 20:20

## 2018-04-13 RX ADMIN — OXYCODONE HYDROCHLORIDE 5 MG: 5 SOLUTION ORAL at 08:56

## 2018-04-13 RX ADMIN — LORAZEPAM 2 MG: 2 INJECTION INTRAMUSCULAR; INTRAVENOUS at 20:27

## 2018-04-13 RX ADMIN — PROPOFOL 35 MCG/KG/MIN: 10 INJECTION, EMULSION INTRAVENOUS at 14:42

## 2018-04-13 RX ADMIN — LORAZEPAM 2 MG: 2 INJECTION INTRAMUSCULAR; INTRAVENOUS at 12:41

## 2018-04-13 RX ADMIN — Medication 10 ML: at 09:03

## 2018-04-13 RX ADMIN — ACETAMINOPHEN 500 MG: 500 TABLET, COATED ORAL at 11:59

## 2018-04-13 RX ADMIN — CHLORHEXIDINE GLUCONATE 15 ML: 1.2 RINSE ORAL at 09:03

## 2018-04-13 RX ADMIN — METHOCARBAMOL 1000 MG: 500 TABLET ORAL at 09:04

## 2018-04-13 RX ADMIN — ENOXAPARIN SODIUM 30 MG: 30 INJECTION SUBCUTANEOUS at 09:04

## 2018-04-13 RX ADMIN — CARVEDILOL 25 MG: 25 TABLET, FILM COATED ORAL at 16:27

## 2018-04-13 RX ADMIN — Medication 10 ML: at 20:29

## 2018-04-13 RX ADMIN — ENOXAPARIN SODIUM 30 MG: 30 INJECTION SUBCUTANEOUS at 20:25

## 2018-04-13 RX ADMIN — LOSARTAN POTASSIUM 100 MG: 50 TABLET, FILM COATED ORAL at 09:03

## 2018-04-13 RX ADMIN — ACETAMINOPHEN 500 MG: 500 TABLET, COATED ORAL at 16:26

## 2018-04-13 RX ADMIN — LORAZEPAM 2 MG: 2 INJECTION INTRAMUSCULAR; INTRAVENOUS at 09:02

## 2018-04-13 RX ADMIN — MULTIPLE VITAMINS W/ MINERALS TAB 1 TABLET: TAB at 09:02

## 2018-04-13 RX ADMIN — PROPOFOL 40 MCG/KG/MIN: 10 INJECTION, EMULSION INTRAVENOUS at 19:30

## 2018-04-13 RX ADMIN — PANTOPRAZOLE SODIUM 40 MG: 40 INJECTION, POWDER, FOR SOLUTION INTRAVENOUS at 09:02

## 2018-04-13 RX ADMIN — CEFAZOLIN SODIUM 2 G: 2 SOLUTION INTRAVENOUS at 04:47

## 2018-04-13 RX ADMIN — LORAZEPAM 2 MG: 2 INJECTION INTRAMUSCULAR; INTRAVENOUS at 04:47

## 2018-04-13 RX ADMIN — LORAZEPAM 2 MG: 2 INJECTION INTRAMUSCULAR; INTRAVENOUS at 01:54

## 2018-04-13 RX ADMIN — ACETAMINOPHEN 500 MG: 500 TABLET, COATED ORAL at 01:50

## 2018-04-13 ASSESSMENT — PAIN SCALES - GENERAL
PAINLEVEL_OUTOF10: 0
PAINLEVEL_OUTOF10: 10
PAINLEVEL_OUTOF10: 0

## 2018-04-13 ASSESSMENT — PULMONARY FUNCTION TESTS
PIF_VALUE: 23
PIF_VALUE: 25
PIF_VALUE: 24
PIF_VALUE: 21
PIF_VALUE: 28
PIF_VALUE: 23

## 2018-04-14 ENCOUNTER — APPOINTMENT (OUTPATIENT)
Dept: GENERAL RADIOLOGY | Age: 53
DRG: 003 | End: 2018-04-14
Payer: MEDICARE

## 2018-04-14 LAB
AADO2: 150.3 MMHG
ALBUMIN SERPL-MCNC: 2.6 G/DL (ref 3.5–5.2)
ALP BLD-CCNC: 61 U/L (ref 40–129)
ALT SERPL-CCNC: 28 U/L (ref 0–40)
ANION GAP SERPL CALCULATED.3IONS-SCNC: 13 MMOL/L (ref 7–16)
AST SERPL-CCNC: 39 U/L (ref 0–39)
B.E.: 4.1 MMOL/L (ref -3–3)
BASOPHILS ABSOLUTE: 0.02 E9/L (ref 0–0.2)
BASOPHILS RELATIVE PERCENT: 0.2 % (ref 0–2)
BILIRUB SERPL-MCNC: 0.4 MG/DL (ref 0–1.2)
BUN BLDV-MCNC: 12 MG/DL (ref 6–20)
CALCIUM IONIZED: 1.19 MMOL/L (ref 1.15–1.33)
CALCIUM SERPL-MCNC: 7.9 MG/DL (ref 8.6–10.2)
CHLORIDE BLD-SCNC: 98 MMOL/L (ref 98–107)
CO2: 28 MMOL/L (ref 22–29)
COHB: 1.3 % (ref 0–1.5)
CREAT SERPL-MCNC: 0.6 MG/DL (ref 0.7–1.2)
CRITICAL: ABNORMAL
DATE ANALYZED: ABNORMAL
DATE OF COLLECTION: ABNORMAL
EOSINOPHILS ABSOLUTE: 0.04 E9/L (ref 0.05–0.5)
EOSINOPHILS RELATIVE PERCENT: 0.3 % (ref 0–6)
FIO2: 40 %
GFR AFRICAN AMERICAN: >60
GFR NON-AFRICAN AMERICAN: >60 ML/MIN/1.73
GLUCOSE BLD-MCNC: 115 MG/DL (ref 74–109)
HCO3: 27 MMOL/L (ref 22–26)
HCT VFR BLD CALC: 25.2 % (ref 37–54)
HEMOGLOBIN: 8.2 G/DL (ref 12.5–16.5)
HHB: 2.5 % (ref 0–5)
IMMATURE GRANULOCYTES #: 0.1 E9/L
IMMATURE GRANULOCYTES %: 0.9 % (ref 0–5)
LAB: ABNORMAL
LYMPHOCYTES ABSOLUTE: 1.56 E9/L (ref 1.5–4)
LYMPHOCYTES RELATIVE PERCENT: 13.6 % (ref 20–42)
Lab: 610
MAGNESIUM: 2.4 MG/DL (ref 1.6–2.6)
MCH RBC QN AUTO: 30.3 PG (ref 26–35)
MCHC RBC AUTO-ENTMCNC: 32.5 % (ref 32–34.5)
MCV RBC AUTO: 93 FL (ref 80–99.9)
METER GLUCOSE: 123 MG/DL (ref 70–110)
METER GLUCOSE: 138 MG/DL (ref 70–110)
METER GLUCOSE: 142 MG/DL (ref 70–110)
METER GLUCOSE: 155 MG/DL (ref 70–110)
METER GLUCOSE: 156 MG/DL (ref 70–110)
METHB: 0.1 % (ref 0–1.5)
MODE: AC
MONOCYTES ABSOLUTE: 1.33 E9/L (ref 0.1–0.95)
MONOCYTES RELATIVE PERCENT: 11.6 % (ref 2–12)
NEUTROPHILS ABSOLUTE: 8.45 E9/L (ref 1.8–7.3)
NEUTROPHILS RELATIVE PERCENT: 73.4 % (ref 43–80)
O2 CONTENT: 11.6 ML/DL
O2 SATURATION: 97.5 % (ref 92–98.5)
O2HB: 96.1 % (ref 94–97)
OPERATOR ID: ABNORMAL
PATIENT TEMP: 37 C
PCO2: 33.4 MMHG (ref 35–45)
PDW BLD-RTO: 15 FL (ref 11.5–15)
PEEP/CPAP: 5 CMH?O
PFO2: 2.13 MMHG/%
PH BLOOD GAS: 7.53 (ref 7.35–7.45)
PHOSPHORUS: 2.1 MG/DL (ref 2.5–4.5)
PLATELET # BLD: 260 E9/L (ref 130–450)
PMV BLD AUTO: 9.9 FL (ref 7–12)
PO2: 85.3 MMHG (ref 60–100)
POTASSIUM REFLEX MAGNESIUM: 4 MMOL/L (ref 3.5–5)
RBC # BLD: 2.71 E12/L (ref 3.8–5.8)
RI(T): 176 %
RR MECHANICAL: 14 B/MIN
SODIUM BLD-SCNC: 139 MMOL/L (ref 132–146)
SOURCE, BLOOD GAS: ABNORMAL
THB: 8.5 G/DL (ref 11.5–16.5)
TIME ANALYZED: 613
TOTAL PROTEIN: 5.6 G/DL (ref 6.4–8.3)
VT MECHANICAL: 550 ML
WBC # BLD: 11.5 E9/L (ref 4.5–11.5)

## 2018-04-14 PROCEDURE — 6360000002 HC RX W HCPCS: Performed by: STUDENT IN AN ORGANIZED HEALTH CARE EDUCATION/TRAINING PROGRAM

## 2018-04-14 PROCEDURE — 36415 COLL VENOUS BLD VENIPUNCTURE: CPT

## 2018-04-14 PROCEDURE — 6370000000 HC RX 637 (ALT 250 FOR IP): Performed by: STUDENT IN AN ORGANIZED HEALTH CARE EDUCATION/TRAINING PROGRAM

## 2018-04-14 PROCEDURE — 83735 ASSAY OF MAGNESIUM: CPT

## 2018-04-14 PROCEDURE — 2000000000 HC ICU R&B

## 2018-04-14 PROCEDURE — 99291 CRITICAL CARE FIRST HOUR: CPT | Performed by: SURGERY

## 2018-04-14 PROCEDURE — 84100 ASSAY OF PHOSPHORUS: CPT

## 2018-04-14 PROCEDURE — 82330 ASSAY OF CALCIUM: CPT

## 2018-04-14 PROCEDURE — C9113 INJ PANTOPRAZOLE SODIUM, VIA: HCPCS | Performed by: STUDENT IN AN ORGANIZED HEALTH CARE EDUCATION/TRAINING PROGRAM

## 2018-04-14 PROCEDURE — 71045 X-RAY EXAM CHEST 1 VIEW: CPT

## 2018-04-14 PROCEDURE — 2580000003 HC RX 258: Performed by: STUDENT IN AN ORGANIZED HEALTH CARE EDUCATION/TRAINING PROGRAM

## 2018-04-14 PROCEDURE — 82805 BLOOD GASES W/O2 SATURATION: CPT

## 2018-04-14 PROCEDURE — 85025 COMPLETE CBC W/AUTO DIFF WBC: CPT

## 2018-04-14 PROCEDURE — 80053 COMPREHEN METABOLIC PANEL: CPT

## 2018-04-14 PROCEDURE — 82962 GLUCOSE BLOOD TEST: CPT

## 2018-04-14 PROCEDURE — 94003 VENT MGMT INPAT SUBQ DAY: CPT

## 2018-04-14 RX ORDER — ACETAMINOPHEN 160 MG/5ML
650 SOLUTION ORAL
Status: DISCONTINUED | OUTPATIENT
Start: 2018-04-14 | End: 2018-04-16

## 2018-04-14 RX ADMIN — ENOXAPARIN SODIUM 30 MG: 30 INJECTION SUBCUTANEOUS at 08:08

## 2018-04-14 RX ADMIN — INSULIN LISPRO 1 UNITS: 100 INJECTION, SOLUTION INTRAVENOUS; SUBCUTANEOUS at 12:43

## 2018-04-14 RX ADMIN — ACETAMINOPHEN 500 MG: 500 TABLET, COATED ORAL at 12:33

## 2018-04-14 RX ADMIN — METHOCARBAMOL 1000 MG: 500 TABLET ORAL at 16:31

## 2018-04-14 RX ADMIN — Medication 10 ML: at 12:34

## 2018-04-14 RX ADMIN — Medication 10 ML: at 20:33

## 2018-04-14 RX ADMIN — MULTIPLE VITAMINS W/ MINERALS TAB 1 TABLET: TAB at 08:07

## 2018-04-14 RX ADMIN — PHENOBARBITAL SODIUM 130 MG: 130 INJECTION INTRAMUSCULAR; INTRAVENOUS at 00:52

## 2018-04-14 RX ADMIN — ACETAMINOPHEN 500 MG: 500 TABLET, COATED ORAL at 08:08

## 2018-04-14 RX ADMIN — PREGABALIN 75 MG: 75 CAPSULE ORAL at 20:31

## 2018-04-14 RX ADMIN — PROPOFOL 30 MCG/KG/MIN: 10 INJECTION, EMULSION INTRAVENOUS at 10:53

## 2018-04-14 RX ADMIN — PROPOFOL 30 MCG/KG/MIN: 10 INJECTION, EMULSION INTRAVENOUS at 04:20

## 2018-04-14 RX ADMIN — POLYETHYLENE GLYCOL 3350 17 G: 17 POWDER, FOR SOLUTION ORAL at 08:07

## 2018-04-14 RX ADMIN — ACETAMINOPHEN 500 MG: 500 TABLET, COATED ORAL at 00:28

## 2018-04-14 RX ADMIN — CARVEDILOL 25 MG: 25 TABLET, FILM COATED ORAL at 16:31

## 2018-04-14 RX ADMIN — PHENOBARBITAL SODIUM 130 MG: 130 INJECTION INTRAMUSCULAR; INTRAVENOUS at 23:54

## 2018-04-14 RX ADMIN — Medication 10 ML: at 23:54

## 2018-04-14 RX ADMIN — ACETAMINOPHEN 650 MG: 650 SOLUTION ORAL at 20:31

## 2018-04-14 RX ADMIN — LORAZEPAM 2 MG: 2 INJECTION INTRAMUSCULAR; INTRAVENOUS at 16:31

## 2018-04-14 RX ADMIN — PHENOBARBITAL SODIUM 130 MG: 130 INJECTION INTRAMUSCULAR; INTRAVENOUS at 06:03

## 2018-04-14 RX ADMIN — PREGABALIN 75 MG: 75 CAPSULE ORAL at 08:08

## 2018-04-14 RX ADMIN — METHOCARBAMOL 1000 MG: 500 TABLET ORAL at 08:06

## 2018-04-14 RX ADMIN — LORAZEPAM 2 MG: 2 INJECTION INTRAMUSCULAR; INTRAVENOUS at 20:32

## 2018-04-14 RX ADMIN — CHLORHEXIDINE GLUCONATE 15 ML: 1.2 RINSE ORAL at 20:31

## 2018-04-14 RX ADMIN — PHENOBARBITAL SODIUM 130 MG: 130 INJECTION INTRAMUSCULAR; INTRAVENOUS at 12:21

## 2018-04-14 RX ADMIN — MINERAL OIL AND WHITE PETROLATUM: 150; 830 OINTMENT OPHTHALMIC at 00:01

## 2018-04-14 RX ADMIN — PHENOBARBITAL SODIUM 130 MG: 130 INJECTION INTRAMUSCULAR; INTRAVENOUS at 17:01

## 2018-04-14 RX ADMIN — DOCUSATE SODIUM 100 MG: 50 LIQUID ORAL at 20:31

## 2018-04-14 RX ADMIN — Medication 10 ML: at 00:54

## 2018-04-14 RX ADMIN — CARVEDILOL 25 MG: 25 TABLET, FILM COATED ORAL at 08:06

## 2018-04-14 RX ADMIN — METHOCARBAMOL 1000 MG: 500 TABLET ORAL at 20:31

## 2018-04-14 RX ADMIN — INSULIN LISPRO 1 UNITS: 100 INJECTION, SOLUTION INTRAVENOUS; SUBCUTANEOUS at 00:49

## 2018-04-14 RX ADMIN — INSULIN LISPRO 1 UNITS: 100 INJECTION, SOLUTION INTRAVENOUS; SUBCUTANEOUS at 08:14

## 2018-04-14 RX ADMIN — ACETAMINOPHEN 650 MG: 650 SOLUTION ORAL at 23:54

## 2018-04-14 RX ADMIN — PROPOFOL 30 MCG/KG/MIN: 10 INJECTION, EMULSION INTRAVENOUS at 18:31

## 2018-04-14 RX ADMIN — LORAZEPAM 2 MG: 2 INJECTION INTRAMUSCULAR; INTRAVENOUS at 08:47

## 2018-04-14 RX ADMIN — ENOXAPARIN SODIUM 30 MG: 30 INJECTION SUBCUTANEOUS at 20:32

## 2018-04-14 RX ADMIN — DOCUSATE SODIUM 100 MG: 50 LIQUID ORAL at 08:07

## 2018-04-14 RX ADMIN — PRAVASTATIN SODIUM 20 MG: 20 TABLET ORAL at 20:31

## 2018-04-14 RX ADMIN — BISACODYL 10 MG: 10 SUPPOSITORY RECTAL at 08:07

## 2018-04-14 RX ADMIN — LORAZEPAM 2 MG: 2 INJECTION INTRAMUSCULAR; INTRAVENOUS at 12:35

## 2018-04-14 RX ADMIN — VERAPAMIL HYDROCHLORIDE 240 MG: 240 TABLET, FILM COATED, EXTENDED RELEASE ORAL at 08:06

## 2018-04-14 RX ADMIN — METHOCARBAMOL 1000 MG: 500 TABLET ORAL at 12:33

## 2018-04-14 RX ADMIN — LOSARTAN POTASSIUM 100 MG: 50 TABLET, FILM COATED ORAL at 08:09

## 2018-04-14 RX ADMIN — ACETAMINOPHEN 650 MG: 650 SOLUTION ORAL at 16:37

## 2018-04-14 RX ADMIN — CHLORHEXIDINE GLUCONATE 15 ML: 1.2 RINSE ORAL at 08:08

## 2018-04-14 RX ADMIN — Medication 10 ML: at 08:08

## 2018-04-14 RX ADMIN — STANDARDIZED SENNA CONCENTRATE AND DOCUSATE SODIUM 1 TABLET: 8.6; 5 TABLET, FILM COATED ORAL at 08:11

## 2018-04-14 RX ADMIN — PANTOPRAZOLE SODIUM 40 MG: 40 INJECTION, POWDER, FOR SOLUTION INTRAVENOUS at 08:08

## 2018-04-14 RX ADMIN — LORAZEPAM 2 MG: 2 INJECTION INTRAMUSCULAR; INTRAVENOUS at 05:28

## 2018-04-14 RX ADMIN — LORAZEPAM 2 MG: 2 INJECTION INTRAMUSCULAR; INTRAVENOUS at 00:52

## 2018-04-14 ASSESSMENT — PAIN SCALES - GENERAL
PAINLEVEL_OUTOF10: 0
PAINLEVEL_OUTOF10: 2

## 2018-04-14 ASSESSMENT — PULMONARY FUNCTION TESTS
PIF_VALUE: 19
PIF_VALUE: 18
PIF_VALUE: 13
PIF_VALUE: 20
PIF_VALUE: 20

## 2018-04-15 ENCOUNTER — APPOINTMENT (OUTPATIENT)
Dept: GENERAL RADIOLOGY | Age: 53
DRG: 003 | End: 2018-04-15
Payer: MEDICARE

## 2018-04-15 LAB
AADO2: 159.4 MMHG
ALBUMIN SERPL-MCNC: 2.8 G/DL (ref 3.5–5.2)
ALP BLD-CCNC: 64 U/L (ref 40–129)
ALT SERPL-CCNC: 30 U/L (ref 0–40)
ANION GAP SERPL CALCULATED.3IONS-SCNC: 11 MMOL/L (ref 7–16)
AST SERPL-CCNC: 33 U/L (ref 0–39)
B.E.: 1.4 MMOL/L (ref -3–3)
BASOPHILS ABSOLUTE: 0.02 E9/L (ref 0–0.2)
BASOPHILS RELATIVE PERCENT: 0.3 % (ref 0–2)
BILIRUB SERPL-MCNC: 0.4 MG/DL (ref 0–1.2)
BLOOD BANK DISPENSE STATUS: NORMAL
BLOOD BANK PRODUCT CODE: NORMAL
BPU ID: NORMAL
BUN BLDV-MCNC: 15 MG/DL (ref 6–20)
CALCIUM IONIZED: 1.21 MMOL/L (ref 1.15–1.33)
CALCIUM SERPL-MCNC: 8 MG/DL (ref 8.6–10.2)
CHLORIDE BLD-SCNC: 93 MMOL/L (ref 98–107)
CO2: 28 MMOL/L (ref 22–29)
COHB: 0.7 % (ref 0–1.5)
CREAT SERPL-MCNC: 0.6 MG/DL (ref 0.7–1.2)
CRITICAL: ABNORMAL
DATE ANALYZED: ABNORMAL
DATE OF COLLECTION: ABNORMAL
DESCRIPTION BLOOD BANK: NORMAL
EOSINOPHILS ABSOLUTE: 0.05 E9/L (ref 0.05–0.5)
EOSINOPHILS RELATIVE PERCENT: 0.7 % (ref 0–6)
FIO2: 40 %
GFR AFRICAN AMERICAN: >60
GFR NON-AFRICAN AMERICAN: >60 ML/MIN/1.73
GLUCOSE BLD-MCNC: 131 MG/DL (ref 74–109)
HCO3: 25.1 MMOL/L (ref 22–26)
HCT VFR BLD CALC: 24.4 % (ref 37–54)
HEMOGLOBIN: 7.9 G/DL (ref 12.5–16.5)
HHB: 4.8 % (ref 0–5)
IMMATURE GRANULOCYTES #: 0.13 E9/L
IMMATURE GRANULOCYTES %: 1.7 % (ref 0–5)
LAB: ABNORMAL
LYMPHOCYTES ABSOLUTE: 1.3 E9/L (ref 1.5–4)
LYMPHOCYTES RELATIVE PERCENT: 16.9 % (ref 20–42)
Lab: 522
MAGNESIUM: 2.3 MG/DL (ref 1.6–2.6)
MCH RBC QN AUTO: 30.3 PG (ref 26–35)
MCHC RBC AUTO-ENTMCNC: 32.4 % (ref 32–34.5)
MCV RBC AUTO: 93.5 FL (ref 80–99.9)
METER GLUCOSE: 132 MG/DL (ref 70–110)
METER GLUCOSE: 136 MG/DL (ref 70–110)
METER GLUCOSE: 138 MG/DL (ref 70–110)
METER GLUCOSE: 143 MG/DL (ref 70–110)
METER GLUCOSE: 146 MG/DL (ref 70–110)
METER GLUCOSE: 173 MG/DL (ref 70–110)
METHB: 0.2 % (ref 0–1.5)
MODE: ABNORMAL
MONOCYTES ABSOLUTE: 0.83 E9/L (ref 0.1–0.95)
MONOCYTES RELATIVE PERCENT: 10.8 % (ref 2–12)
NEUTROPHILS ABSOLUTE: 5.34 E9/L (ref 1.8–7.3)
NEUTROPHILS RELATIVE PERCENT: 69.6 % (ref 43–80)
O2 CONTENT: 12.9 ML/DL
O2 SATURATION: 95.2 % (ref 92–98.5)
O2HB: 94.3 % (ref 94–97)
OPERATOR ID: 2577
PATIENT TEMP: 37 C
PCO2: 35.8 MMHG (ref 35–45)
PDW BLD-RTO: 14.6 FL (ref 11.5–15)
PEEP/CPAP: 5 CMH?O
PFO2: 1.87 MMHG/%
PH BLOOD GAS: 7.46 (ref 7.35–7.45)
PHENOBARBITAL LEVEL: 35.9 MCG/ML (ref 15–40)
PHOSPHORUS: 3 MG/DL (ref 2.5–4.5)
PLATELET # BLD: 271 E9/L (ref 130–450)
PMV BLD AUTO: 9.7 FL (ref 7–12)
PO2: 74.6 MMHG (ref 60–100)
POTASSIUM REFLEX MAGNESIUM: 3.7 MMOL/L (ref 3.5–5)
PS: 8 CMH20
RBC # BLD: 2.61 E12/L (ref 3.8–5.8)
RI(T): 214 %
SODIUM BLD-SCNC: 132 MMOL/L (ref 132–146)
SOURCE, BLOOD GAS: ABNORMAL
THB: 9.7 G/DL (ref 11.5–16.5)
TIME ANALYZED: 522
TOTAL PROTEIN: 5.8 G/DL (ref 6.4–8.3)
WBC # BLD: 7.7 E9/L (ref 4.5–11.5)

## 2018-04-15 PROCEDURE — 82962 GLUCOSE BLOOD TEST: CPT

## 2018-04-15 PROCEDURE — 6360000002 HC RX W HCPCS: Performed by: STUDENT IN AN ORGANIZED HEALTH CARE EDUCATION/TRAINING PROGRAM

## 2018-04-15 PROCEDURE — 6370000000 HC RX 637 (ALT 250 FOR IP): Performed by: STUDENT IN AN ORGANIZED HEALTH CARE EDUCATION/TRAINING PROGRAM

## 2018-04-15 PROCEDURE — 84100 ASSAY OF PHOSPHORUS: CPT

## 2018-04-15 PROCEDURE — 94003 VENT MGMT INPAT SUBQ DAY: CPT

## 2018-04-15 PROCEDURE — 82805 BLOOD GASES W/O2 SATURATION: CPT

## 2018-04-15 PROCEDURE — 83735 ASSAY OF MAGNESIUM: CPT

## 2018-04-15 PROCEDURE — 2580000003 HC RX 258: Performed by: STUDENT IN AN ORGANIZED HEALTH CARE EDUCATION/TRAINING PROGRAM

## 2018-04-15 PROCEDURE — 36415 COLL VENOUS BLD VENIPUNCTURE: CPT

## 2018-04-15 PROCEDURE — C9113 INJ PANTOPRAZOLE SODIUM, VIA: HCPCS | Performed by: STUDENT IN AN ORGANIZED HEALTH CARE EDUCATION/TRAINING PROGRAM

## 2018-04-15 PROCEDURE — 99291 CRITICAL CARE FIRST HOUR: CPT | Performed by: SURGERY

## 2018-04-15 PROCEDURE — 80184 ASSAY OF PHENOBARBITAL: CPT

## 2018-04-15 PROCEDURE — 82330 ASSAY OF CALCIUM: CPT

## 2018-04-15 PROCEDURE — 85025 COMPLETE CBC W/AUTO DIFF WBC: CPT

## 2018-04-15 PROCEDURE — 2000000000 HC ICU R&B

## 2018-04-15 PROCEDURE — 71045 X-RAY EXAM CHEST 1 VIEW: CPT

## 2018-04-15 PROCEDURE — 80053 COMPREHEN METABOLIC PANEL: CPT

## 2018-04-15 RX ORDER — OXYCODONE HCL 5 MG/5 ML
5 SOLUTION, ORAL ORAL EVERY 4 HOURS
Status: DISCONTINUED | OUTPATIENT
Start: 2018-04-15 | End: 2018-04-16

## 2018-04-15 RX ORDER — LORAZEPAM 2 MG/ML
2 INJECTION INTRAMUSCULAR EVERY 6 HOURS PRN
Status: DISCONTINUED | OUTPATIENT
Start: 2018-04-15 | End: 2018-04-17

## 2018-04-15 RX ADMIN — PHENOBARBITAL SODIUM 130 MG: 130 INJECTION INTRAMUSCULAR; INTRAVENOUS at 17:15

## 2018-04-15 RX ADMIN — LORAZEPAM 2 MG: 2 INJECTION INTRAMUSCULAR; INTRAVENOUS at 14:01

## 2018-04-15 RX ADMIN — ENOXAPARIN SODIUM 30 MG: 30 INJECTION SUBCUTANEOUS at 20:43

## 2018-04-15 RX ADMIN — Medication 10 ML: at 08:23

## 2018-04-15 RX ADMIN — OXYCODONE HYDROCHLORIDE 5 MG: 5 SOLUTION ORAL at 11:44

## 2018-04-15 RX ADMIN — LORAZEPAM 2 MG: 2 INJECTION INTRAMUSCULAR; INTRAVENOUS at 04:38

## 2018-04-15 RX ADMIN — PHENOBARBITAL SODIUM 130 MG: 130 INJECTION INTRAMUSCULAR; INTRAVENOUS at 11:01

## 2018-04-15 RX ADMIN — LORAZEPAM 2 MG: 2 INJECTION INTRAMUSCULAR; INTRAVENOUS at 01:23

## 2018-04-15 RX ADMIN — DOCUSATE SODIUM 100 MG: 50 LIQUID ORAL at 20:39

## 2018-04-15 RX ADMIN — PRAVASTATIN SODIUM 20 MG: 20 TABLET ORAL at 21:48

## 2018-04-15 RX ADMIN — LORAZEPAM 2 MG: 2 INJECTION INTRAMUSCULAR; INTRAVENOUS at 23:41

## 2018-04-15 RX ADMIN — PANTOPRAZOLE SODIUM 40 MG: 40 INJECTION, POWDER, FOR SOLUTION INTRAVENOUS at 08:22

## 2018-04-15 RX ADMIN — Medication 10 ML: at 20:42

## 2018-04-15 RX ADMIN — METHOCARBAMOL 1000 MG: 500 TABLET ORAL at 16:41

## 2018-04-15 RX ADMIN — LORAZEPAM 2 MG: 2 INJECTION INTRAMUSCULAR; INTRAVENOUS at 08:22

## 2018-04-15 RX ADMIN — OXYCODONE HYDROCHLORIDE 5 MG: 5 SOLUTION ORAL at 23:41

## 2018-04-15 RX ADMIN — ACETAMINOPHEN 650 MG: 650 SOLUTION ORAL at 23:41

## 2018-04-15 RX ADMIN — ACETAMINOPHEN 650 MG: 650 SOLUTION ORAL at 14:02

## 2018-04-15 RX ADMIN — DOCUSATE SODIUM 100 MG: 50 LIQUID ORAL at 08:15

## 2018-04-15 RX ADMIN — ACETAMINOPHEN 650 MG: 650 SOLUTION ORAL at 08:22

## 2018-04-15 RX ADMIN — BISACODYL 10 MG: 10 SUPPOSITORY RECTAL at 08:22

## 2018-04-15 RX ADMIN — METHOCARBAMOL 1000 MG: 500 TABLET ORAL at 20:39

## 2018-04-15 RX ADMIN — PREGABALIN 75 MG: 75 CAPSULE ORAL at 20:39

## 2018-04-15 RX ADMIN — ENOXAPARIN SODIUM 30 MG: 30 INJECTION SUBCUTANEOUS at 08:22

## 2018-04-15 RX ADMIN — Medication 10 ML: at 23:43

## 2018-04-15 RX ADMIN — CARVEDILOL 25 MG: 25 TABLET, FILM COATED ORAL at 16:41

## 2018-04-15 RX ADMIN — METHOCARBAMOL 1000 MG: 500 TABLET ORAL at 12:59

## 2018-04-15 RX ADMIN — PHENOBARBITAL SODIUM 130 MG: 130 INJECTION INTRAMUSCULAR; INTRAVENOUS at 05:59

## 2018-04-15 RX ADMIN — POLYETHYLENE GLYCOL 3350 17 G: 17 POWDER, FOR SOLUTION ORAL at 08:22

## 2018-04-15 RX ADMIN — INSULIN LISPRO 1 UNITS: 100 INJECTION, SOLUTION INTRAVENOUS; SUBCUTANEOUS at 11:43

## 2018-04-15 RX ADMIN — STANDARDIZED SENNA CONCENTRATE AND DOCUSATE SODIUM 1 TABLET: 8.6; 5 TABLET, FILM COATED ORAL at 08:16

## 2018-04-15 RX ADMIN — INSULIN LISPRO 1 UNITS: 100 INJECTION, SOLUTION INTRAVENOUS; SUBCUTANEOUS at 16:41

## 2018-04-15 RX ADMIN — MULTIPLE VITAMINS W/ MINERALS TAB 1 TABLET: TAB at 08:15

## 2018-04-15 RX ADMIN — METHOCARBAMOL 1000 MG: 500 TABLET ORAL at 08:18

## 2018-04-15 RX ADMIN — OXYCODONE HYDROCHLORIDE 5 MG: 5 SOLUTION ORAL at 15:34

## 2018-04-15 RX ADMIN — CHLORHEXIDINE GLUCONATE 15 ML: 1.2 RINSE ORAL at 08:22

## 2018-04-15 RX ADMIN — ACETAMINOPHEN 650 MG: 650 SOLUTION ORAL at 04:30

## 2018-04-15 RX ADMIN — PROPOFOL 20 MCG/KG/MIN: 10 INJECTION, EMULSION INTRAVENOUS at 14:09

## 2018-04-15 RX ADMIN — CHLORHEXIDINE GLUCONATE 15 ML: 1.2 RINSE ORAL at 20:39

## 2018-04-15 RX ADMIN — OXYCODONE HYDROCHLORIDE 5 MG: 5 SOLUTION ORAL at 19:50

## 2018-04-15 RX ADMIN — INSULIN LISPRO 1 UNITS: 100 INJECTION, SOLUTION INTRAVENOUS; SUBCUTANEOUS at 19:50

## 2018-04-15 RX ADMIN — PROPOFOL 20 MCG/KG/MIN: 10 INJECTION, EMULSION INTRAVENOUS at 20:48

## 2018-04-15 RX ADMIN — PROPOFOL 30 MCG/KG/MIN: 10 INJECTION, EMULSION INTRAVENOUS at 05:58

## 2018-04-15 RX ADMIN — ACETAMINOPHEN 650 MG: 650 SOLUTION ORAL at 19:48

## 2018-04-15 RX ADMIN — PHENOBARBITAL SODIUM 130 MG: 130 INJECTION INTRAMUSCULAR; INTRAVENOUS at 23:41

## 2018-04-15 RX ADMIN — PREGABALIN 75 MG: 75 CAPSULE ORAL at 08:22

## 2018-04-15 RX ADMIN — Medication 10 ML: at 20:41

## 2018-04-15 ASSESSMENT — PULMONARY FUNCTION TESTS
PIF_VALUE: 14
PIF_VALUE: 14
PIF_VALUE: 15
PIF_VALUE: 14
PIF_VALUE: 13
PIF_VALUE: 14

## 2018-04-15 ASSESSMENT — PAIN SCALES - GENERAL
PAINLEVEL_OUTOF10: 0

## 2018-04-16 ENCOUNTER — APPOINTMENT (OUTPATIENT)
Dept: GENERAL RADIOLOGY | Age: 53
DRG: 003 | End: 2018-04-16
Payer: MEDICARE

## 2018-04-16 LAB
AADO2: 144.3 MMHG
ALBUMIN SERPL-MCNC: 2.7 G/DL (ref 3.5–5.2)
ALP BLD-CCNC: 70 U/L (ref 40–129)
ALT SERPL-CCNC: 32 U/L (ref 0–40)
ANION GAP SERPL CALCULATED.3IONS-SCNC: 14 MMOL/L (ref 7–16)
AST SERPL-CCNC: 28 U/L (ref 0–39)
B.E.: 3.1 MMOL/L (ref -3–3)
BASOPHILS ABSOLUTE: 0.02 E9/L (ref 0–0.2)
BASOPHILS RELATIVE PERCENT: 0.2 % (ref 0–2)
BILIRUB SERPL-MCNC: 0.4 MG/DL (ref 0–1.2)
BUN BLDV-MCNC: 15 MG/DL (ref 6–20)
CALCIUM IONIZED: 1.25 MMOL/L (ref 1.15–1.33)
CALCIUM SERPL-MCNC: 8.3 MG/DL (ref 8.6–10.2)
CHLORIDE BLD-SCNC: 98 MMOL/L (ref 98–107)
CO2: 26 MMOL/L (ref 22–29)
COHB: 1.6 % (ref 0–1.5)
CREAT SERPL-MCNC: 0.6 MG/DL (ref 0.7–1.2)
CRITICAL: ABNORMAL
DATE ANALYZED: ABNORMAL
DATE OF COLLECTION: ABNORMAL
EOSINOPHILS ABSOLUTE: 0.07 E9/L (ref 0.05–0.5)
EOSINOPHILS RELATIVE PERCENT: 0.8 % (ref 0–6)
FIO2: 40 %
GFR AFRICAN AMERICAN: >60
GFR NON-AFRICAN AMERICAN: >60 ML/MIN/1.73
GLUCOSE BLD-MCNC: 134 MG/DL (ref 74–109)
HCO3: 27.1 MMOL/L (ref 22–26)
HCT VFR BLD CALC: 24.3 % (ref 37–54)
HEMOGLOBIN: 7.9 G/DL (ref 12.5–16.5)
HHB: 3.2 % (ref 0–5)
IMMATURE GRANULOCYTES #: 0.17 E9/L
IMMATURE GRANULOCYTES %: 2 % (ref 0–5)
LAB: ABNORMAL
LYMPHOCYTES ABSOLUTE: 1.22 E9/L (ref 1.5–4)
LYMPHOCYTES RELATIVE PERCENT: 14.3 % (ref 20–42)
Lab: 649
MAGNESIUM: 2.1 MG/DL (ref 1.6–2.6)
MCH RBC QN AUTO: 30.2 PG (ref 26–35)
MCHC RBC AUTO-ENTMCNC: 32.5 % (ref 32–34.5)
MCV RBC AUTO: 92.7 FL (ref 80–99.9)
METER GLUCOSE: 133 MG/DL (ref 70–110)
METER GLUCOSE: 137 MG/DL (ref 70–110)
METER GLUCOSE: 149 MG/DL (ref 70–110)
METER GLUCOSE: 154 MG/DL (ref 70–110)
METER GLUCOSE: 156 MG/DL (ref 70–110)
METER GLUCOSE: 158 MG/DL (ref 70–110)
METHB: 0.3 % (ref 0–1.5)
MODE: ABNORMAL
MONOCYTES ABSOLUTE: 0.93 E9/L (ref 0.1–0.95)
MONOCYTES RELATIVE PERCENT: 10.9 % (ref 2–12)
NEUTROPHILS ABSOLUTE: 6.13 E9/L (ref 1.8–7.3)
NEUTROPHILS RELATIVE PERCENT: 71.8 % (ref 43–80)
O2 CONTENT: 11.9 ML/DL
O2 SATURATION: 96.7 % (ref 92–98.5)
O2HB: 94.9 % (ref 94–97)
OPERATOR ID: 1088
PATIENT TEMP: 37 C
PCO2: 38.7 MMHG (ref 35–45)
PDW BLD-RTO: 14.2 FL (ref 11.5–15)
PEEP/CPAP: 5 CMH?O
PFO2: 2.07 MMHG/%
PH BLOOD GAS: 7.46 (ref 7.35–7.45)
PHOSPHORUS: 3.4 MG/DL (ref 2.5–4.5)
PLATELET # BLD: 323 E9/L (ref 130–450)
PMV BLD AUTO: 9.2 FL (ref 7–12)
PO2: 82.8 MMHG (ref 60–100)
POTASSIUM REFLEX MAGNESIUM: 4 MMOL/L (ref 3.5–5)
PS: 8 CMH20
RBC # BLD: 2.62 E12/L (ref 3.8–5.8)
RI(T): 174 %
SODIUM BLD-SCNC: 138 MMOL/L (ref 132–146)
SOURCE, BLOOD GAS: ABNORMAL
THB: 8.8 G/DL (ref 11.5–16.5)
TIME ANALYZED: 650
TOTAL PROTEIN: 6.1 G/DL (ref 6.4–8.3)
WBC # BLD: 8.5 E9/L (ref 4.5–11.5)

## 2018-04-16 PROCEDURE — 71045 X-RAY EXAM CHEST 1 VIEW: CPT

## 2018-04-16 PROCEDURE — 6360000002 HC RX W HCPCS: Performed by: STUDENT IN AN ORGANIZED HEALTH CARE EDUCATION/TRAINING PROGRAM

## 2018-04-16 PROCEDURE — 80053 COMPREHEN METABOLIC PANEL: CPT

## 2018-04-16 PROCEDURE — 6370000000 HC RX 637 (ALT 250 FOR IP): Performed by: STUDENT IN AN ORGANIZED HEALTH CARE EDUCATION/TRAINING PROGRAM

## 2018-04-16 PROCEDURE — 2000000000 HC ICU R&B

## 2018-04-16 PROCEDURE — 2580000003 HC RX 258: Performed by: STUDENT IN AN ORGANIZED HEALTH CARE EDUCATION/TRAINING PROGRAM

## 2018-04-16 PROCEDURE — 6370000000 HC RX 637 (ALT 250 FOR IP): Performed by: SURGERY

## 2018-04-16 PROCEDURE — 0BB10ZZ EXCISION OF TRACHEA, OPEN APPROACH: ICD-10-PCS | Performed by: SURGERY

## 2018-04-16 PROCEDURE — C9113 INJ PANTOPRAZOLE SODIUM, VIA: HCPCS | Performed by: STUDENT IN AN ORGANIZED HEALTH CARE EDUCATION/TRAINING PROGRAM

## 2018-04-16 PROCEDURE — 82962 GLUCOSE BLOOD TEST: CPT

## 2018-04-16 PROCEDURE — 31605 EMER TRACHEOSTOMY CTHYR MEM: CPT | Performed by: SURGERY

## 2018-04-16 PROCEDURE — 31500 INSERT EMERGENCY AIRWAY: CPT

## 2018-04-16 PROCEDURE — 85025 COMPLETE CBC W/AUTO DIFF WBC: CPT

## 2018-04-16 PROCEDURE — 99291 CRITICAL CARE FIRST HOUR: CPT | Performed by: SURGERY

## 2018-04-16 PROCEDURE — 5A1955Z RESPIRATORY VENTILATION, GREATER THAN 96 CONSECUTIVE HOURS: ICD-10-PCS | Performed by: SURGERY

## 2018-04-16 PROCEDURE — 36415 COLL VENOUS BLD VENIPUNCTURE: CPT

## 2018-04-16 PROCEDURE — 94664 DEMO&/EVAL PT USE INHALER: CPT

## 2018-04-16 PROCEDURE — 84100 ASSAY OF PHOSPHORUS: CPT

## 2018-04-16 PROCEDURE — 82805 BLOOD GASES W/O2 SATURATION: CPT

## 2018-04-16 PROCEDURE — 94003 VENT MGMT INPAT SUBQ DAY: CPT

## 2018-04-16 PROCEDURE — 6370000000 HC RX 637 (ALT 250 FOR IP)

## 2018-04-16 PROCEDURE — 83735 ASSAY OF MAGNESIUM: CPT

## 2018-04-16 PROCEDURE — 82330 ASSAY OF CALCIUM: CPT

## 2018-04-16 PROCEDURE — 6360000002 HC RX W HCPCS

## 2018-04-16 RX ORDER — PHENOBARBITAL 32.4 MG/1
97.2 TABLET ORAL 2 TIMES DAILY
Status: DISCONTINUED | OUTPATIENT
Start: 2018-04-16 | End: 2018-04-19

## 2018-04-16 RX ORDER — RISPERIDONE 0.5 MG/1
0.5 TABLET, FILM COATED ORAL 2 TIMES DAILY
Status: DISCONTINUED | OUTPATIENT
Start: 2018-04-16 | End: 2018-04-19

## 2018-04-16 RX ORDER — SUCCINYLCHOLINE CHLORIDE 20 MG/ML
140 INJECTION INTRAMUSCULAR; INTRAVENOUS ONCE
Status: COMPLETED | OUTPATIENT
Start: 2018-04-16 | End: 2018-04-16

## 2018-04-16 RX ORDER — PHENOBARBITAL 32.4 MG/1
97.2 TABLET ORAL 2 TIMES DAILY
Status: DISCONTINUED | OUTPATIENT
Start: 2018-04-16 | End: 2018-04-16

## 2018-04-16 RX ORDER — DEXAMETHASONE SODIUM PHOSPHATE 4 MG/ML
INJECTION, SOLUTION INTRA-ARTICULAR; INTRALESIONAL; INTRAMUSCULAR; INTRAVENOUS; SOFT TISSUE
Status: COMPLETED
Start: 2018-04-16 | End: 2018-04-16

## 2018-04-16 RX ORDER — ACETAMINOPHEN 160 MG/5ML
SOLUTION ORAL
Status: COMPLETED
Start: 2018-04-16 | End: 2018-04-16

## 2018-04-16 RX ORDER — PANTOPRAZOLE SODIUM 40 MG/1
40 TABLET, DELAYED RELEASE ORAL
Status: DISCONTINUED | OUTPATIENT
Start: 2018-04-17 | End: 2018-04-17

## 2018-04-16 RX ORDER — OXYCODONE HCL 5 MG/5 ML
10 SOLUTION, ORAL ORAL EVERY 4 HOURS
Status: DISCONTINUED | OUTPATIENT
Start: 2018-04-16 | End: 2018-04-18

## 2018-04-16 RX ORDER — DEXAMETHASONE SODIUM PHOSPHATE 10 MG/ML
10 INJECTION INTRAMUSCULAR; INTRAVENOUS EVERY 6 HOURS
Status: DISCONTINUED | OUTPATIENT
Start: 2018-04-16 | End: 2018-04-18

## 2018-04-16 RX ORDER — SUCCINYLCHOLINE CHLORIDE 20 MG/ML
INJECTION INTRAMUSCULAR; INTRAVENOUS
Status: COMPLETED
Start: 2018-04-16 | End: 2018-04-16

## 2018-04-16 RX ORDER — PROPOFOL 10 MG/ML
10 INJECTION, EMULSION INTRAVENOUS
Status: DISCONTINUED | OUTPATIENT
Start: 2018-04-16 | End: 2018-04-18

## 2018-04-16 RX ORDER — PROPOFOL 10 MG/ML
10 INJECTION, EMULSION INTRAVENOUS
Status: DISCONTINUED | OUTPATIENT
Start: 2018-04-16 | End: 2018-04-16 | Stop reason: SDUPTHER

## 2018-04-16 RX ORDER — VERAPAMIL HYDROCHLORIDE 80 MG/1
80 TABLET ORAL EVERY 8 HOURS SCHEDULED
Status: DISCONTINUED | OUTPATIENT
Start: 2018-04-16 | End: 2018-04-27 | Stop reason: HOSPADM

## 2018-04-16 RX ORDER — PROPOFOL 10 MG/ML
INJECTION, EMULSION INTRAVENOUS
Status: COMPLETED
Start: 2018-04-16 | End: 2018-04-16

## 2018-04-16 RX ORDER — DEXAMETHASONE SODIUM PHOSPHATE 10 MG/ML
10 INJECTION INTRAMUSCULAR; INTRAVENOUS ONCE
Status: DISCONTINUED | OUTPATIENT
Start: 2018-04-16 | End: 2018-04-16

## 2018-04-16 RX ADMIN — PROPOFOL 30 MCG/KG/MIN: 10 INJECTION, EMULSION INTRAVENOUS at 18:48

## 2018-04-16 RX ADMIN — INSULIN LISPRO 1 UNITS: 100 INJECTION, SOLUTION INTRAVENOUS; SUBCUTANEOUS at 21:46

## 2018-04-16 RX ADMIN — ACETAMINOPHEN 650 MG: 650 SOLUTION ORAL at 21:03

## 2018-04-16 RX ADMIN — METHOCARBAMOL 1000 MG: 500 TABLET ORAL at 09:32

## 2018-04-16 RX ADMIN — DEXAMETHASONE SODIUM PHOSPHATE 12 MG: 4 INJECTION, SOLUTION INTRAMUSCULAR; INTRAVENOUS at 18:46

## 2018-04-16 RX ADMIN — METHOCARBAMOL 1000 MG: 500 TABLET ORAL at 16:25

## 2018-04-16 RX ADMIN — OXYCODONE HYDROCHLORIDE 5 MG: 5 SOLUTION ORAL at 04:19

## 2018-04-16 RX ADMIN — DOCUSATE SODIUM 100 MG: 50 LIQUID ORAL at 21:05

## 2018-04-16 RX ADMIN — ACETAMINOPHEN 650 MG: 650 SOLUTION ORAL at 04:19

## 2018-04-16 RX ADMIN — OXYCODONE HYDROCHLORIDE 5 MG: 5 SOLUTION ORAL at 03:16

## 2018-04-16 RX ADMIN — RISPERIDONE 0.5 MG: 0.5 TABLET, FILM COATED ORAL at 21:05

## 2018-04-16 RX ADMIN — OXYCODONE HYDROCHLORIDE 10 MG: 5 SOLUTION ORAL at 16:17

## 2018-04-16 RX ADMIN — PROPOFOL 30 MCG/KG/MIN: 10 INJECTION, EMULSION INTRAVENOUS at 21:49

## 2018-04-16 RX ADMIN — SUCCINYLCHOLINE CHLORIDE 140 MG: 20 INJECTION INTRAMUSCULAR; INTRAVENOUS at 18:36

## 2018-04-16 RX ADMIN — RISPERIDONE 0.5 MG: 0.5 TABLET, FILM COATED ORAL at 13:42

## 2018-04-16 RX ADMIN — METHOCARBAMOL 1000 MG: 500 TABLET ORAL at 21:05

## 2018-04-16 RX ADMIN — Medication 10 ML: at 09:33

## 2018-04-16 RX ADMIN — PROPOFOL 30 MCG/KG/MIN: 10 INJECTION, EMULSION INTRAVENOUS at 12:20

## 2018-04-16 RX ADMIN — STANDARDIZED SENNA CONCENTRATE AND DOCUSATE SODIUM 1 TABLET: 8.6; 5 TABLET, FILM COATED ORAL at 09:31

## 2018-04-16 RX ADMIN — CARVEDILOL 25 MG: 25 TABLET, FILM COATED ORAL at 16:26

## 2018-04-16 RX ADMIN — METHOCARBAMOL 1000 MG: 500 TABLET ORAL at 13:43

## 2018-04-16 RX ADMIN — PHENOBARBITAL 97.2 MG: 32.4 TABLET ORAL at 21:04

## 2018-04-16 RX ADMIN — RACEPINEPHRINE HYDROCHLORIDE 11.25 MG: 11.25 SOLUTION RESPIRATORY (INHALATION) at 18:17

## 2018-04-16 RX ADMIN — POLYETHYLENE GLYCOL 3350 17 G: 17 POWDER, FOR SOLUTION ORAL at 09:33

## 2018-04-16 RX ADMIN — SUCCINYLCHOLINE CHLORIDE 140 MG: 20 INJECTION, SOLUTION INTRAMUSCULAR; INTRAVENOUS; PARENTERAL at 18:36

## 2018-04-16 RX ADMIN — CHLORHEXIDINE GLUCONATE 15 ML: 1.2 RINSE ORAL at 09:50

## 2018-04-16 RX ADMIN — PREGABALIN 75 MG: 75 CAPSULE ORAL at 21:04

## 2018-04-16 RX ADMIN — ACETAMINOPHEN 650 MG: 650 SOLUTION ORAL at 08:00

## 2018-04-16 RX ADMIN — PANTOPRAZOLE SODIUM 40 MG: 40 INJECTION, POWDER, FOR SOLUTION INTRAVENOUS at 09:32

## 2018-04-16 RX ADMIN — ACETAMINOPHEN 650 MG: 650 SOLUTION ORAL at 11:30

## 2018-04-16 RX ADMIN — CARVEDILOL 25 MG: 25 TABLET, FILM COATED ORAL at 08:05

## 2018-04-16 RX ADMIN — Medication 10 ML: at 06:11

## 2018-04-16 RX ADMIN — BISACODYL 10 MG: 10 SUPPOSITORY RECTAL at 09:33

## 2018-04-16 RX ADMIN — PHENOBARBITAL SODIUM 130 MG: 130 INJECTION INTRAMUSCULAR; INTRAVENOUS at 11:30

## 2018-04-16 RX ADMIN — PROPOFOL 25 MCG/KG/MIN: 10 INJECTION, EMULSION INTRAVENOUS at 06:29

## 2018-04-16 RX ADMIN — Medication 10 ML: at 21:05

## 2018-04-16 RX ADMIN — OXYCODONE HYDROCHLORIDE 5 MG: 5 SOLUTION ORAL at 07:59

## 2018-04-16 RX ADMIN — INSULIN LISPRO 1 UNITS: 100 INJECTION, SOLUTION INTRAVENOUS; SUBCUTANEOUS at 01:17

## 2018-04-16 RX ADMIN — VERAPAMIL HYDROCHLORIDE 80 MG: 80 TABLET, FILM COATED ORAL at 13:59

## 2018-04-16 RX ADMIN — MULTIPLE VITAMINS W/ MINERALS TAB 1 TABLET: TAB at 09:32

## 2018-04-16 RX ADMIN — OXYCODONE HYDROCHLORIDE 5 MG: 5 SOLUTION ORAL at 11:32

## 2018-04-16 RX ADMIN — CHLORHEXIDINE GLUCONATE 15 ML: 1.2 RINSE ORAL at 21:04

## 2018-04-16 RX ADMIN — VERAPAMIL HYDROCHLORIDE 80 MG: 80 TABLET, FILM COATED ORAL at 21:04

## 2018-04-16 RX ADMIN — Medication 10 ML: at 09:35

## 2018-04-16 RX ADMIN — PHENOBARBITAL SODIUM 130 MG: 130 INJECTION INTRAMUSCULAR; INTRAVENOUS at 06:10

## 2018-04-16 RX ADMIN — ENOXAPARIN SODIUM 30 MG: 30 INJECTION SUBCUTANEOUS at 09:32

## 2018-04-16 RX ADMIN — OXYCODONE HYDROCHLORIDE 10 MG: 5 SOLUTION ORAL at 21:04

## 2018-04-16 RX ADMIN — DOCUSATE SODIUM 100 MG: 50 LIQUID ORAL at 09:32

## 2018-04-16 RX ADMIN — PRAVASTATIN SODIUM 20 MG: 20 TABLET ORAL at 21:05

## 2018-04-16 RX ADMIN — PREGABALIN 75 MG: 75 CAPSULE ORAL at 09:31

## 2018-04-16 RX ADMIN — ENOXAPARIN SODIUM 30 MG: 30 INJECTION SUBCUTANEOUS at 21:04

## 2018-04-16 RX ADMIN — INSULIN LISPRO 1 UNITS: 100 INJECTION, SOLUTION INTRAVENOUS; SUBCUTANEOUS at 09:56

## 2018-04-16 ASSESSMENT — PAIN SCALES - GENERAL
PAINLEVEL_OUTOF10: 5
PAINLEVEL_OUTOF10: 0

## 2018-04-16 ASSESSMENT — PULMONARY FUNCTION TESTS
PIF_VALUE: 23
PIF_VALUE: 13
PIF_VALUE: 14

## 2018-04-17 ENCOUNTER — APPOINTMENT (OUTPATIENT)
Dept: GENERAL RADIOLOGY | Age: 53
DRG: 003 | End: 2018-04-17
Payer: MEDICARE

## 2018-04-17 ENCOUNTER — ANESTHESIA (OUTPATIENT)
Dept: OPERATING ROOM | Age: 53
DRG: 003 | End: 2018-04-17
Payer: MEDICARE

## 2018-04-17 ENCOUNTER — ANESTHESIA EVENT (OUTPATIENT)
Dept: OPERATING ROOM | Age: 53
DRG: 003 | End: 2018-04-17
Payer: MEDICARE

## 2018-04-17 VITALS
OXYGEN SATURATION: 98 % | DIASTOLIC BLOOD PRESSURE: 67 MMHG | SYSTOLIC BLOOD PRESSURE: 113 MMHG | TEMPERATURE: 98.4 F | RESPIRATION RATE: 19 BRPM

## 2018-04-17 LAB
AADO2: 108.7 MMHG
ALBUMIN SERPL-MCNC: 3.2 G/DL (ref 3.5–5.2)
ALP BLD-CCNC: 93 U/L (ref 40–129)
ALT SERPL-CCNC: 37 U/L (ref 0–40)
ANION GAP SERPL CALCULATED.3IONS-SCNC: 16 MMOL/L (ref 7–16)
APPEARANCE FLUID: ABNORMAL
AST SERPL-CCNC: 28 U/L (ref 0–39)
B.E.: 1.4 MMOL/L (ref -3–3)
BASO FLUID: 0 %
BASOPHILS ABSOLUTE: 0.01 E9/L (ref 0–0.2)
BASOPHILS RELATIVE PERCENT: 0.1 % (ref 0–2)
BILIRUB SERPL-MCNC: 0.5 MG/DL (ref 0–1.2)
BUN BLDV-MCNC: 16 MG/DL (ref 6–20)
CALCIUM IONIZED: 1.24 MMOL/L (ref 1.15–1.33)
CALCIUM SERPL-MCNC: 9.3 MG/DL (ref 8.6–10.2)
CELL COUNT FLUID TYPE: ABNORMAL
CHLORIDE BLD-SCNC: 92 MMOL/L (ref 98–107)
CO2: 28 MMOL/L (ref 22–29)
COHB: 0.9 % (ref 0–1.5)
COLOR FLUID: ABNORMAL
CREAT SERPL-MCNC: 0.6 MG/DL (ref 0.7–1.2)
CRITICAL: ABNORMAL
DATE ANALYZED: ABNORMAL
DATE OF COLLECTION: ABNORMAL
EOSINOPHIL FLUID: 0 %
EOSINOPHILS ABSOLUTE: 0 E9/L (ref 0.05–0.5)
EOSINOPHILS RELATIVE PERCENT: 0 % (ref 0–6)
FIO2: 40 %
GFR AFRICAN AMERICAN: >60
GFR NON-AFRICAN AMERICAN: >60 ML/MIN/1.73
GLUCOSE BLD-MCNC: 143 MG/DL (ref 74–109)
GRAM STAIN ORDERABLE: NORMAL
HCO3: 24.5 MMOL/L (ref 22–26)
HCT VFR BLD CALC: 25 % (ref 37–54)
HEMOGLOBIN: 8.7 G/DL (ref 12.5–16.5)
HHB: 0.9 % (ref 0–5)
IMMATURE GRANULOCYTES #: 0.21 E9/L
IMMATURE GRANULOCYTES %: 1.8 % (ref 0–5)
LAB: ABNORMAL
LYMPHOCYTES ABSOLUTE: 0.74 E9/L (ref 1.5–4)
LYMPHOCYTES RELATIVE PERCENT: 6.3 % (ref 20–42)
LYMPHOCYTES, BODY FLUID: 20 %
Lab: 450
MAGNESIUM: 2.3 MG/DL (ref 1.6–2.6)
MCH RBC QN AUTO: 31.9 PG (ref 26–35)
MCHC RBC AUTO-ENTMCNC: 34.8 % (ref 32–34.5)
MCV RBC AUTO: 91.6 FL (ref 80–99.9)
METER GLUCOSE: 142 MG/DL (ref 70–110)
METER GLUCOSE: 142 MG/DL (ref 70–110)
METER GLUCOSE: 144 MG/DL (ref 70–110)
METER GLUCOSE: 144 MG/DL (ref 70–110)
METER GLUCOSE: 145 MG/DL (ref 70–110)
METER GLUCOSE: 146 MG/DL (ref 70–110)
METHB: 0.2 % (ref 0–1.5)
MODE: AC
MONOCYTE, FLUID: 4 %
MONOCYTES ABSOLUTE: 0.68 E9/L (ref 0.1–0.95)
MONOCYTES RELATIVE PERCENT: 5.8 % (ref 2–12)
NEUTROPHIL, FLUID: 76 %
NEUTROPHILS ABSOLUTE: 10.05 E9/L (ref 1.8–7.3)
NEUTROPHILS RELATIVE PERCENT: 86 % (ref 43–80)
NUCLEATED CELLS FLUID: ABNORMAL /UL
O2 CONTENT: 14.2 ML/DL
O2 SATURATION: 99.1 % (ref 92–98.5)
O2HB: 98 % (ref 94–97)
OPERATOR ID: ABNORMAL
PATIENT TEMP: 37 C
PCO2: 33.3 MMHG (ref 35–45)
PDW BLD-RTO: 13.8 FL (ref 11.5–15)
PEEP/CPAP: 8 CMH?O
PFO2: 3.2 MMHG/%
PH BLOOD GAS: 7.49 (ref 7.35–7.45)
PHOSPHORUS: 4 MG/DL (ref 2.5–4.5)
PLATELET # BLD: 383 E9/L (ref 130–450)
PMV BLD AUTO: 9.6 FL (ref 7–12)
PO2: 128.2 MMHG (ref 60–100)
POTASSIUM REFLEX MAGNESIUM: 4.3 MMOL/L (ref 3.5–5)
PROCALCITONIN: 0.11 NG/ML (ref 0–0.08)
RBC # BLD: 2.73 E12/L (ref 3.8–5.8)
RBC FLUID: ABNORMAL /UL
RI(T): 85 %
RR MECHANICAL: 14 B/MIN
SODIUM BLD-SCNC: 136 MMOL/L (ref 132–146)
SOURCE, BLOOD GAS: ABNORMAL
THB: 10.1 G/DL (ref 11.5–16.5)
TIME ANALYZED: 511
TOTAL PROTEIN: 6.6 G/DL (ref 6.4–8.3)
VT MECHANICAL: 550 ML
WBC # BLD: 11.7 E9/L (ref 4.5–11.5)

## 2018-04-17 PROCEDURE — 6370000000 HC RX 637 (ALT 250 FOR IP): Performed by: STUDENT IN AN ORGANIZED HEALTH CARE EDUCATION/TRAINING PROGRAM

## 2018-04-17 PROCEDURE — 36415 COLL VENOUS BLD VENIPUNCTURE: CPT

## 2018-04-17 PROCEDURE — 31624 DX BRONCHOSCOPE/LAVAGE: CPT | Performed by: SURGERY

## 2018-04-17 PROCEDURE — 87116 MYCOBACTERIA CULTURE: CPT

## 2018-04-17 PROCEDURE — 87077 CULTURE AEROBIC IDENTIFY: CPT

## 2018-04-17 PROCEDURE — 6360000002 HC RX W HCPCS: Performed by: NURSE ANESTHETIST, CERTIFIED REGISTERED

## 2018-04-17 PROCEDURE — 85025 COMPLETE CBC W/AUTO DIFF WBC: CPT

## 2018-04-17 PROCEDURE — 6360000002 HC RX W HCPCS: Performed by: STUDENT IN AN ORGANIZED HEALTH CARE EDUCATION/TRAINING PROGRAM

## 2018-04-17 PROCEDURE — 87186 SC STD MICRODIL/AGAR DIL: CPT

## 2018-04-17 PROCEDURE — 3600000003 HC SURGERY LEVEL 3 BASE: Performed by: SURGERY

## 2018-04-17 PROCEDURE — 84100 ASSAY OF PHOSPHORUS: CPT

## 2018-04-17 PROCEDURE — 2580000003 HC RX 258: Performed by: STUDENT IN AN ORGANIZED HEALTH CARE EDUCATION/TRAINING PROGRAM

## 2018-04-17 PROCEDURE — 71045 X-RAY EXAM CHEST 1 VIEW: CPT

## 2018-04-17 PROCEDURE — 6370000000 HC RX 637 (ALT 250 FOR IP): Performed by: SURGERY

## 2018-04-17 PROCEDURE — 6360000002 HC RX W HCPCS: Performed by: SURGERY

## 2018-04-17 PROCEDURE — 3700000001 HC ADD 15 MINUTES (ANESTHESIA): Performed by: SURGERY

## 2018-04-17 PROCEDURE — 80053 COMPREHEN METABOLIC PANEL: CPT

## 2018-04-17 PROCEDURE — 87070 CULTURE OTHR SPECIMN AEROBIC: CPT

## 2018-04-17 PROCEDURE — 2000000000 HC ICU R&B

## 2018-04-17 PROCEDURE — 87205 SMEAR GRAM STAIN: CPT

## 2018-04-17 PROCEDURE — 94003 VENT MGMT INPAT SUBQ DAY: CPT

## 2018-04-17 PROCEDURE — 87102 FUNGUS ISOLATION CULTURE: CPT

## 2018-04-17 PROCEDURE — 87015 SPECIMEN INFECT AGNT CONCNTJ: CPT

## 2018-04-17 PROCEDURE — 84145 PROCALCITONIN (PCT): CPT

## 2018-04-17 PROCEDURE — 89051 BODY FLUID CELL COUNT: CPT

## 2018-04-17 PROCEDURE — 83735 ASSAY OF MAGNESIUM: CPT

## 2018-04-17 PROCEDURE — 87206 SMEAR FLUORESCENT/ACID STAI: CPT

## 2018-04-17 PROCEDURE — 0B110F4 BYPASS TRACHEA TO CUTANEOUS WITH TRACHEOSTOMY DEVICE, OPEN APPROACH: ICD-10-PCS | Performed by: SURGERY

## 2018-04-17 PROCEDURE — 82962 GLUCOSE BLOOD TEST: CPT

## 2018-04-17 PROCEDURE — 82330 ASSAY OF CALCIUM: CPT

## 2018-04-17 PROCEDURE — 3700000000 HC ANESTHESIA ATTENDED CARE: Performed by: SURGERY

## 2018-04-17 PROCEDURE — 2500000003 HC RX 250 WO HCPCS: Performed by: NURSE ANESTHETIST, CERTIFIED REGISTERED

## 2018-04-17 PROCEDURE — 3600000013 HC SURGERY LEVEL 3 ADDTL 15MIN: Performed by: SURGERY

## 2018-04-17 PROCEDURE — 82805 BLOOD GASES W/O2 SATURATION: CPT

## 2018-04-17 PROCEDURE — 2709999900 HC NON-CHARGEABLE SUPPLY: Performed by: SURGERY

## 2018-04-17 PROCEDURE — 99291 CRITICAL CARE FIRST HOUR: CPT | Performed by: SURGERY

## 2018-04-17 PROCEDURE — 31600 PLANNED TRACHEOSTOMY: CPT | Performed by: SURGERY

## 2018-04-17 PROCEDURE — 2580000003 HC RX 258: Performed by: NURSE ANESTHETIST, CERTIFIED REGISTERED

## 2018-04-17 PROCEDURE — 0B9F8ZX DRAINAGE OF RIGHT LOWER LUNG LOBE, VIA NATURAL OR ARTIFICIAL OPENING ENDOSCOPIC, DIAGNOSTIC: ICD-10-PCS | Performed by: SURGERY

## 2018-04-17 RX ORDER — VECURONIUM BROMIDE 1 MG/ML
INJECTION, POWDER, LYOPHILIZED, FOR SOLUTION INTRAVENOUS PRN
Status: DISCONTINUED | OUTPATIENT
Start: 2018-04-17 | End: 2018-04-17 | Stop reason: SDUPTHER

## 2018-04-17 RX ORDER — FENTANYL CITRATE 50 UG/ML
INJECTION, SOLUTION INTRAMUSCULAR; INTRAVENOUS PRN
Status: DISCONTINUED | OUTPATIENT
Start: 2018-04-17 | End: 2018-04-17 | Stop reason: SDUPTHER

## 2018-04-17 RX ORDER — PANTOPRAZOLE SODIUM 40 MG/1
40 GRANULE, DELAYED RELEASE ORAL
Status: DISCONTINUED | OUTPATIENT
Start: 2018-04-18 | End: 2018-04-26

## 2018-04-17 RX ORDER — SODIUM CHLORIDE 9 MG/ML
INJECTION, SOLUTION INTRAVENOUS CONTINUOUS
Status: DISCONTINUED | OUTPATIENT
Start: 2018-04-17 | End: 2018-04-17

## 2018-04-17 RX ORDER — SODIUM CHLORIDE 9 MG/ML
INJECTION, SOLUTION INTRAVENOUS CONTINUOUS PRN
Status: DISCONTINUED | OUTPATIENT
Start: 2018-04-17 | End: 2018-04-17 | Stop reason: SDUPTHER

## 2018-04-17 RX ADMIN — PHENOBARBITAL 97.2 MG: 32.4 TABLET ORAL at 09:55

## 2018-04-17 RX ADMIN — OXYCODONE HYDROCHLORIDE 10 MG: 5 SOLUTION ORAL at 04:43

## 2018-04-17 RX ADMIN — PROPOFOL 40 MCG/KG/MIN: 10 INJECTION, EMULSION INTRAVENOUS at 17:05

## 2018-04-17 RX ADMIN — DEXAMETHASONE SODIUM PHOSPHATE 10 MG: 10 INJECTION, SOLUTION INTRAMUSCULAR; INTRAVENOUS at 12:01

## 2018-04-17 RX ADMIN — INSULIN LISPRO 1 UNITS: 100 INJECTION, SOLUTION INTRAVENOUS; SUBCUTANEOUS at 18:10

## 2018-04-17 RX ADMIN — Medication 10 ML: at 09:57

## 2018-04-17 RX ADMIN — POLYETHYLENE GLYCOL 3350 17 G: 17 POWDER, FOR SOLUTION ORAL at 09:56

## 2018-04-17 RX ADMIN — FENTANYL CITRATE 50 MCG: 50 INJECTION, SOLUTION INTRAMUSCULAR; INTRAVENOUS at 08:36

## 2018-04-17 RX ADMIN — STANDARDIZED SENNA CONCENTRATE AND DOCUSATE SODIUM 1 TABLET: 8.6; 5 TABLET, FILM COATED ORAL at 09:52

## 2018-04-17 RX ADMIN — CARVEDILOL 25 MG: 25 TABLET, FILM COATED ORAL at 17:44

## 2018-04-17 RX ADMIN — DOCUSATE SODIUM 100 MG: 50 LIQUID ORAL at 09:52

## 2018-04-17 RX ADMIN — INSULIN LISPRO 1 UNITS: 100 INJECTION, SOLUTION INTRAVENOUS; SUBCUTANEOUS at 00:33

## 2018-04-17 RX ADMIN — DOCUSATE SODIUM 100 MG: 50 LIQUID ORAL at 21:05

## 2018-04-17 RX ADMIN — FENTANYL CITRATE 50 MCG: 50 INJECTION, SOLUTION INTRAMUSCULAR; INTRAVENOUS at 08:57

## 2018-04-17 RX ADMIN — RISPERIDONE 0.5 MG: 0.5 TABLET, FILM COATED ORAL at 21:28

## 2018-04-17 RX ADMIN — OXYCODONE HYDROCHLORIDE 10 MG: 5 SOLUTION ORAL at 23:55

## 2018-04-17 RX ADMIN — DEXAMETHASONE SODIUM PHOSPHATE 10 MG: 10 INJECTION, SOLUTION INTRAMUSCULAR; INTRAVENOUS at 19:26

## 2018-04-17 RX ADMIN — BISACODYL 10 MG: 10 SUPPOSITORY RECTAL at 09:56

## 2018-04-17 RX ADMIN — Medication 10 ML: at 23:58

## 2018-04-17 RX ADMIN — PROPOFOL 50 MCG/KG/MIN: 10 INJECTION, EMULSION INTRAVENOUS at 07:30

## 2018-04-17 RX ADMIN — CEFAZOLIN SODIUM 2 G: 2 SOLUTION INTRAVENOUS at 08:20

## 2018-04-17 RX ADMIN — DEXAMETHASONE SODIUM PHOSPHATE 10 MG: 10 INJECTION, SOLUTION INTRAMUSCULAR; INTRAVENOUS at 06:30

## 2018-04-17 RX ADMIN — Medication 10 ML: at 21:28

## 2018-04-17 RX ADMIN — ENOXAPARIN SODIUM 30 MG: 30 INJECTION SUBCUTANEOUS at 09:54

## 2018-04-17 RX ADMIN — PREGABALIN 75 MG: 75 CAPSULE ORAL at 09:55

## 2018-04-17 RX ADMIN — CHLORHEXIDINE GLUCONATE 15 ML: 1.2 RINSE ORAL at 21:04

## 2018-04-17 RX ADMIN — PRAVASTATIN SODIUM 20 MG: 20 TABLET ORAL at 21:07

## 2018-04-17 RX ADMIN — SODIUM CHLORIDE: 9 INJECTION, SOLUTION INTRAVENOUS at 06:30

## 2018-04-17 RX ADMIN — OXYCODONE HYDROCHLORIDE 10 MG: 5 SOLUTION ORAL at 17:43

## 2018-04-17 RX ADMIN — FENTANYL CITRATE 100 MCG: 50 INJECTION, SOLUTION INTRAMUSCULAR; INTRAVENOUS at 08:11

## 2018-04-17 RX ADMIN — OXYCODONE HYDROCHLORIDE 10 MG: 5 SOLUTION ORAL at 00:33

## 2018-04-17 RX ADMIN — METHOCARBAMOL 1000 MG: 500 TABLET ORAL at 21:06

## 2018-04-17 RX ADMIN — MULTIPLE VITAMINS W/ MINERALS TAB 1 TABLET: TAB at 09:53

## 2018-04-17 RX ADMIN — METHOCARBAMOL 1000 MG: 500 TABLET ORAL at 17:43

## 2018-04-17 RX ADMIN — PREGABALIN 75 MG: 75 CAPSULE ORAL at 21:27

## 2018-04-17 RX ADMIN — VERAPAMIL HYDROCHLORIDE 80 MG: 80 TABLET, FILM COATED ORAL at 21:07

## 2018-04-17 RX ADMIN — METHOCARBAMOL 1000 MG: 500 TABLET ORAL at 13:41

## 2018-04-17 RX ADMIN — PROPOFOL 40 MCG/KG/MIN: 10 INJECTION, EMULSION INTRAVENOUS at 22:16

## 2018-04-17 RX ADMIN — INSULIN LISPRO 1 UNITS: 100 INJECTION, SOLUTION INTRAVENOUS; SUBCUTANEOUS at 09:56

## 2018-04-17 RX ADMIN — VERAPAMIL HYDROCHLORIDE 80 MG: 80 TABLET, FILM COATED ORAL at 13:41

## 2018-04-17 RX ADMIN — INSULIN LISPRO 1 UNITS: 100 INJECTION, SOLUTION INTRAVENOUS; SUBCUTANEOUS at 13:42

## 2018-04-17 RX ADMIN — RISPERIDONE 0.5 MG: 0.5 TABLET, FILM COATED ORAL at 09:52

## 2018-04-17 RX ADMIN — VERAPAMIL HYDROCHLORIDE 80 MG: 80 TABLET, FILM COATED ORAL at 06:30

## 2018-04-17 RX ADMIN — OXYCODONE HYDROCHLORIDE 10 MG: 5 SOLUTION ORAL at 21:02

## 2018-04-17 RX ADMIN — METHOCARBAMOL 1000 MG: 500 TABLET ORAL at 09:52

## 2018-04-17 RX ADMIN — LORAZEPAM 2 MG: 2 INJECTION INTRAMUSCULAR; INTRAVENOUS at 04:43

## 2018-04-17 RX ADMIN — PROPOFOL 50 MCG/KG/MIN: 10 INJECTION, EMULSION INTRAVENOUS at 10:20

## 2018-04-17 RX ADMIN — SODIUM CHLORIDE: 9 INJECTION, SOLUTION INTRAVENOUS at 08:07

## 2018-04-17 RX ADMIN — DEXAMETHASONE SODIUM PHOSPHATE 10 MG: 10 INJECTION, SOLUTION INTRAMUSCULAR; INTRAVENOUS at 00:33

## 2018-04-17 RX ADMIN — DEXAMETHASONE SODIUM PHOSPHATE 10 MG: 10 INJECTION, SOLUTION INTRAMUSCULAR; INTRAVENOUS at 23:54

## 2018-04-17 RX ADMIN — INSULIN LISPRO 1 UNITS: 100 INJECTION, SOLUTION INTRAVENOUS; SUBCUTANEOUS at 21:56

## 2018-04-17 RX ADMIN — OXYCODONE HYDROCHLORIDE 10 MG: 5 SOLUTION ORAL at 12:01

## 2018-04-17 RX ADMIN — ENOXAPARIN SODIUM 30 MG: 30 INJECTION SUBCUTANEOUS at 21:51

## 2018-04-17 RX ADMIN — VECURONIUM BROMIDE 8 MG: 1 INJECTION, POWDER, LYOPHILIZED, FOR SOLUTION INTRAVENOUS at 08:11

## 2018-04-17 RX ADMIN — CHLORHEXIDINE GLUCONATE 15 ML: 1.2 RINSE ORAL at 09:55

## 2018-04-17 RX ADMIN — PHENOBARBITAL 97.2 MG: 32.4 TABLET ORAL at 21:21

## 2018-04-17 RX ADMIN — INSULIN LISPRO 1 UNITS: 100 INJECTION, SOLUTION INTRAVENOUS; SUBCUTANEOUS at 04:43

## 2018-04-17 ASSESSMENT — PULMONARY FUNCTION TESTS
PIF_VALUE: 24
PIF_VALUE: 41
PIF_VALUE: 23
PIF_VALUE: 23
PIF_VALUE: 24
PIF_VALUE: 26
PIF_VALUE: 28
PIF_VALUE: 24
PIF_VALUE: 27
PIF_VALUE: 24
PIF_VALUE: 2
PIF_VALUE: 26
PIF_VALUE: 20
PIF_VALUE: 24
PIF_VALUE: 35
PIF_VALUE: 31
PIF_VALUE: 24
PIF_VALUE: 25
PIF_VALUE: 25
PIF_VALUE: 24
PIF_VALUE: 27
PIF_VALUE: 24
PIF_VALUE: 32
PIF_VALUE: 24
PIF_VALUE: 23
PIF_VALUE: 30
PIF_VALUE: 24
PIF_VALUE: 26
PIF_VALUE: 36
PIF_VALUE: 24
PIF_VALUE: 29
PIF_VALUE: 25
PIF_VALUE: 21
PIF_VALUE: 25
PIF_VALUE: 32
PIF_VALUE: 31
PIF_VALUE: 22
PIF_VALUE: 0
PIF_VALUE: 24
PIF_VALUE: 23
PIF_VALUE: 40
PIF_VALUE: 32
PIF_VALUE: 30
PIF_VALUE: 18
PIF_VALUE: 18
PIF_VALUE: 11
PIF_VALUE: 24
PIF_VALUE: 18
PIF_VALUE: 28
PIF_VALUE: 28
PIF_VALUE: 24
PIF_VALUE: 31
PIF_VALUE: 20
PIF_VALUE: 24
PIF_VALUE: 25
PIF_VALUE: 18
PIF_VALUE: 29
PIF_VALUE: 31
PIF_VALUE: 50
PIF_VALUE: 24
PIF_VALUE: 22
PIF_VALUE: 13
PIF_VALUE: 24
PIF_VALUE: 29
PIF_VALUE: 25
PIF_VALUE: 35
PIF_VALUE: 24
PIF_VALUE: 34
PIF_VALUE: 28
PIF_VALUE: 26
PIF_VALUE: 30
PIF_VALUE: 9
PIF_VALUE: 33

## 2018-04-17 ASSESSMENT — PAIN SCALES - GENERAL
PAINLEVEL_OUTOF10: 2
PAINLEVEL_OUTOF10: 6
PAINLEVEL_OUTOF10: 0
PAINLEVEL_OUTOF10: 0
PAINLEVEL_OUTOF10: 6
PAINLEVEL_OUTOF10: 3
PAINLEVEL_OUTOF10: 0
PAINLEVEL_OUTOF10: 0
PAINLEVEL_OUTOF10: 5

## 2018-04-18 ENCOUNTER — APPOINTMENT (OUTPATIENT)
Dept: GENERAL RADIOLOGY | Age: 53
DRG: 003 | End: 2018-04-18
Payer: MEDICARE

## 2018-04-18 LAB
AADO2: 132.1 MMHG
ABO/RH: NORMAL
ALBUMIN SERPL-MCNC: 3 G/DL (ref 3.5–5.2)
ALP BLD-CCNC: 78 U/L (ref 40–129)
ALT SERPL-CCNC: 41 U/L (ref 0–40)
ANION GAP SERPL CALCULATED.3IONS-SCNC: 14 MMOL/L (ref 7–16)
ANTIBODY SCREEN: NORMAL
APTT: 25.3 SEC (ref 24.5–35.1)
AST SERPL-CCNC: 34 U/L (ref 0–39)
B.E.: 5.4 MMOL/L (ref -3–3)
BASOPHILS ABSOLUTE: 0 E9/L (ref 0–0.2)
BASOPHILS RELATIVE PERCENT: 0 % (ref 0–2)
BILIRUB SERPL-MCNC: 0.4 MG/DL (ref 0–1.2)
BUN BLDV-MCNC: 18 MG/DL (ref 6–20)
CALCIUM IONIZED: 1.26 MMOL/L (ref 1.15–1.33)
CALCIUM SERPL-MCNC: 8.9 MG/DL (ref 8.6–10.2)
CHLORIDE BLD-SCNC: 95 MMOL/L (ref 98–107)
CO2: 28 MMOL/L (ref 22–29)
COHB: 2 % (ref 0–1.5)
CREAT SERPL-MCNC: 0.6 MG/DL (ref 0.7–1.2)
CRITICAL: ABNORMAL
DATE ANALYZED: ABNORMAL
DATE OF COLLECTION: ABNORMAL
EOSINOPHILS ABSOLUTE: 0 E9/L (ref 0.05–0.5)
EOSINOPHILS RELATIVE PERCENT: 0 % (ref 0–6)
FIO2: 40 %
GFR AFRICAN AMERICAN: >60
GFR NON-AFRICAN AMERICAN: >60 ML/MIN/1.73
GLUCOSE BLD-MCNC: 144 MG/DL (ref 74–109)
HCO3: 28.7 MMOL/L (ref 22–26)
HCT VFR BLD CALC: 23.9 % (ref 37–54)
HEMOGLOBIN: 7.9 G/DL (ref 12.5–16.5)
HHB: 1.4 % (ref 0–5)
IMMATURE GRANULOCYTES #: 0.19 E9/L
IMMATURE GRANULOCYTES %: 2.1 % (ref 0–5)
INR BLD: 1.2
LAB: ABNORMAL
LYMPHOCYTES ABSOLUTE: 0.62 E9/L (ref 1.5–4)
LYMPHOCYTES RELATIVE PERCENT: 6.8 % (ref 20–42)
Lab: 606
MAGNESIUM: 2.3 MG/DL (ref 1.6–2.6)
MCH RBC QN AUTO: 30.5 PG (ref 26–35)
MCHC RBC AUTO-ENTMCNC: 33.1 % (ref 32–34.5)
MCV RBC AUTO: 92.3 FL (ref 80–99.9)
METER GLUCOSE: 126 MG/DL (ref 70–110)
METER GLUCOSE: 139 MG/DL (ref 70–110)
METER GLUCOSE: 149 MG/DL (ref 70–110)
METER GLUCOSE: 151 MG/DL (ref 70–110)
METHB: 0.2 % (ref 0–1.5)
MODE: AC
MONOCYTES ABSOLUTE: 0.6 E9/L (ref 0.1–0.95)
MONOCYTES RELATIVE PERCENT: 6.5 % (ref 2–12)
NEUTROPHILS ABSOLUTE: 7.77 E9/L (ref 1.8–7.3)
NEUTROPHILS RELATIVE PERCENT: 84.6 % (ref 43–80)
O2 CONTENT: 11.4 ML/DL
O2 SATURATION: 98.6 % (ref 92–98.5)
O2HB: 96.4 % (ref 94–97)
OPERATOR ID: ABNORMAL
PATIENT TEMP: 37 C
PCO2: 36.8 MMHG (ref 35–45)
PDW BLD-RTO: 13.7 FL (ref 11.5–15)
PEEP/CPAP: 8 CMH?O
PFO2: 2.5 MMHG/%
PH BLOOD GAS: 7.51 (ref 7.35–7.45)
PHOSPHORUS: 3.3 MG/DL (ref 2.5–4.5)
PLATELET # BLD: 433 E9/L (ref 130–450)
PMV BLD AUTO: 9.5 FL (ref 7–12)
PO2: 100 MMHG (ref 60–100)
POTASSIUM REFLEX MAGNESIUM: 4.4 MMOL/L (ref 3.5–5)
PROTHROMBIN TIME: 13.7 SEC (ref 9.3–12.4)
RBC # BLD: 2.59 E12/L (ref 3.8–5.8)
RI(T): 132 %
RR MECHANICAL: 14 B/MIN
SODIUM BLD-SCNC: 137 MMOL/L (ref 132–146)
SOURCE, BLOOD GAS: ABNORMAL
THB: 8.3 G/DL (ref 11.5–16.5)
TIME ANALYZED: 606
TOTAL PROTEIN: 6.5 G/DL (ref 6.4–8.3)
VT MECHANICAL: 550 ML
WBC # BLD: 9.2 E9/L (ref 4.5–11.5)

## 2018-04-18 PROCEDURE — 82805 BLOOD GASES W/O2 SATURATION: CPT

## 2018-04-18 PROCEDURE — 80053 COMPREHEN METABOLIC PANEL: CPT

## 2018-04-18 PROCEDURE — 6370000000 HC RX 637 (ALT 250 FOR IP): Performed by: STUDENT IN AN ORGANIZED HEALTH CARE EDUCATION/TRAINING PROGRAM

## 2018-04-18 PROCEDURE — 2580000003 HC RX 258: Performed by: STUDENT IN AN ORGANIZED HEALTH CARE EDUCATION/TRAINING PROGRAM

## 2018-04-18 PROCEDURE — 6360000002 HC RX W HCPCS: Performed by: STUDENT IN AN ORGANIZED HEALTH CARE EDUCATION/TRAINING PROGRAM

## 2018-04-18 PROCEDURE — 93005 ELECTROCARDIOGRAM TRACING: CPT | Performed by: STUDENT IN AN ORGANIZED HEALTH CARE EDUCATION/TRAINING PROGRAM

## 2018-04-18 PROCEDURE — 83735 ASSAY OF MAGNESIUM: CPT

## 2018-04-18 PROCEDURE — 94003 VENT MGMT INPAT SUBQ DAY: CPT

## 2018-04-18 PROCEDURE — 36415 COLL VENOUS BLD VENIPUNCTURE: CPT

## 2018-04-18 PROCEDURE — 6360000002 HC RX W HCPCS: Performed by: SURGERY

## 2018-04-18 PROCEDURE — 84100 ASSAY OF PHOSPHORUS: CPT

## 2018-04-18 PROCEDURE — 2000000000 HC ICU R&B

## 2018-04-18 PROCEDURE — G8982 BODY POS GOAL STATUS: HCPCS

## 2018-04-18 PROCEDURE — 36592 COLLECT BLOOD FROM PICC: CPT

## 2018-04-18 PROCEDURE — 86901 BLOOD TYPING SEROLOGIC RH(D): CPT

## 2018-04-18 PROCEDURE — 97530 THERAPEUTIC ACTIVITIES: CPT

## 2018-04-18 PROCEDURE — 85610 PROTHROMBIN TIME: CPT

## 2018-04-18 PROCEDURE — G8981 BODY POS CURRENT STATUS: HCPCS

## 2018-04-18 PROCEDURE — 85730 THROMBOPLASTIN TIME PARTIAL: CPT

## 2018-04-18 PROCEDURE — 71045 X-RAY EXAM CHEST 1 VIEW: CPT

## 2018-04-18 PROCEDURE — 99291 CRITICAL CARE FIRST HOUR: CPT | Performed by: SURGERY

## 2018-04-18 PROCEDURE — 85025 COMPLETE CBC W/AUTO DIFF WBC: CPT

## 2018-04-18 PROCEDURE — 97168 OT RE-EVAL EST PLAN CARE: CPT

## 2018-04-18 PROCEDURE — 82962 GLUCOSE BLOOD TEST: CPT

## 2018-04-18 PROCEDURE — 82330 ASSAY OF CALCIUM: CPT

## 2018-04-18 PROCEDURE — 6370000000 HC RX 637 (ALT 250 FOR IP): Performed by: SURGERY

## 2018-04-18 PROCEDURE — 86850 RBC ANTIBODY SCREEN: CPT

## 2018-04-18 PROCEDURE — 97164 PT RE-EVAL EST PLAN CARE: CPT

## 2018-04-18 PROCEDURE — 86900 BLOOD TYPING SEROLOGIC ABO: CPT

## 2018-04-18 RX ORDER — PHENOBARBITAL SODIUM 65 MG/ML
130 INJECTION INTRAMUSCULAR ONCE
Status: COMPLETED | OUTPATIENT
Start: 2018-04-18 | End: 2018-04-18

## 2018-04-18 RX ORDER — MULTIVIT WITH IRON,MINERALS
15 LIQUID (ML) ORAL DAILY
Status: DISCONTINUED | OUTPATIENT
Start: 2018-04-19 | End: 2018-04-26

## 2018-04-18 RX ORDER — OXYCODONE HCL 5 MG/5 ML
15 SOLUTION, ORAL ORAL EVERY 4 HOURS
Status: DISCONTINUED | OUTPATIENT
Start: 2018-04-18 | End: 2018-04-22

## 2018-04-18 RX ORDER — OXYCODONE HCL 5 MG/5 ML
5 SOLUTION, ORAL ORAL ONCE
Status: COMPLETED | OUTPATIENT
Start: 2018-04-18 | End: 2018-04-18

## 2018-04-18 RX ORDER — PROPOFOL 10 MG/ML
10 INJECTION, EMULSION INTRAVENOUS
Status: DISCONTINUED | OUTPATIENT
Start: 2018-04-18 | End: 2018-04-20

## 2018-04-18 RX ADMIN — PHENOBARBITAL SODIUM 130 MG: 65 INJECTION INTRAMUSCULAR; INTRAVENOUS at 18:49

## 2018-04-18 RX ADMIN — LOSARTAN POTASSIUM 100 MG: 50 TABLET, FILM COATED ORAL at 08:29

## 2018-04-18 RX ADMIN — PROPOFOL 20 MCG/KG/MIN: 10 INJECTION, EMULSION INTRAVENOUS at 14:37

## 2018-04-18 RX ADMIN — PANTOPRAZOLE SODIUM 40 MG: 40 GRANULE, DELAYED RELEASE ORAL at 08:14

## 2018-04-18 RX ADMIN — CARVEDILOL 25 MG: 25 TABLET, FILM COATED ORAL at 08:28

## 2018-04-18 RX ADMIN — INSULIN LISPRO 1 UNITS: 100 INJECTION, SOLUTION INTRAVENOUS; SUBCUTANEOUS at 13:15

## 2018-04-18 RX ADMIN — PREGABALIN 75 MG: 75 CAPSULE ORAL at 20:34

## 2018-04-18 RX ADMIN — INSULIN LISPRO 1 UNITS: 100 INJECTION, SOLUTION INTRAVENOUS; SUBCUTANEOUS at 04:35

## 2018-04-18 RX ADMIN — OXYCODONE HYDROCHLORIDE 15 MG: 5 SOLUTION ORAL at 19:51

## 2018-04-18 RX ADMIN — CARVEDILOL 25 MG: 25 TABLET, FILM COATED ORAL at 08:22

## 2018-04-18 RX ADMIN — OXYCODONE HYDROCHLORIDE 10 MG: 5 SOLUTION ORAL at 15:34

## 2018-04-18 RX ADMIN — DOCUSATE SODIUM 100 MG: 50 LIQUID ORAL at 20:38

## 2018-04-18 RX ADMIN — ENOXAPARIN SODIUM 30 MG: 30 INJECTION SUBCUTANEOUS at 20:39

## 2018-04-18 RX ADMIN — ENOXAPARIN SODIUM 30 MG: 30 INJECTION SUBCUTANEOUS at 08:27

## 2018-04-18 RX ADMIN — Medication 10 ML: at 21:02

## 2018-04-18 RX ADMIN — OXYCODONE HYDROCHLORIDE 5 MG: 5 SOLUTION ORAL at 18:35

## 2018-04-18 RX ADMIN — METHOCARBAMOL 1000 MG: 500 TABLET ORAL at 08:29

## 2018-04-18 RX ADMIN — CARVEDILOL 25 MG: 25 TABLET, FILM COATED ORAL at 16:36

## 2018-04-18 RX ADMIN — MULTIPLE VITAMINS W/ MINERALS TAB 1 TABLET: TAB at 08:29

## 2018-04-18 RX ADMIN — METHOCARBAMOL 1000 MG: 500 TABLET ORAL at 20:35

## 2018-04-18 RX ADMIN — DEXAMETHASONE SODIUM PHOSPHATE 10 MG: 10 INJECTION, SOLUTION INTRAMUSCULAR; INTRAVENOUS at 08:13

## 2018-04-18 RX ADMIN — PHENOBARBITAL 97.2 MG: 32.4 TABLET ORAL at 20:34

## 2018-04-18 RX ADMIN — PRAVASTATIN SODIUM 20 MG: 20 TABLET ORAL at 20:34

## 2018-04-18 RX ADMIN — PROPOFOL 40 MCG/KG/MIN: 10 INJECTION, EMULSION INTRAVENOUS at 02:01

## 2018-04-18 RX ADMIN — PROPOFOL 40 MCG/KG/MIN: 10 INJECTION, EMULSION INTRAVENOUS at 11:32

## 2018-04-18 RX ADMIN — CHLORHEXIDINE GLUCONATE 15 ML: 1.2 RINSE ORAL at 08:42

## 2018-04-18 RX ADMIN — OXYCODONE HYDROCHLORIDE 5 MG: 5 SOLUTION ORAL at 15:14

## 2018-04-18 RX ADMIN — RISPERIDONE 0.5 MG: 0.5 TABLET, FILM COATED ORAL at 08:29

## 2018-04-18 RX ADMIN — OXYCODONE HYDROCHLORIDE 10 MG: 5 SOLUTION ORAL at 04:30

## 2018-04-18 RX ADMIN — PROPOFOL 40 MCG/KG/MIN: 10 INJECTION, EMULSION INTRAVENOUS at 06:06

## 2018-04-18 RX ADMIN — RISPERIDONE 0.5 MG: 0.5 TABLET, FILM COATED ORAL at 20:35

## 2018-04-18 RX ADMIN — VERAPAMIL HYDROCHLORIDE 80 MG: 80 TABLET, FILM COATED ORAL at 13:15

## 2018-04-18 RX ADMIN — OXYCODONE HYDROCHLORIDE 10 MG: 5 SOLUTION ORAL at 12:00

## 2018-04-18 RX ADMIN — VERAPAMIL HYDROCHLORIDE 80 MG: 80 TABLET, FILM COATED ORAL at 21:50

## 2018-04-18 RX ADMIN — PREGABALIN 75 MG: 75 CAPSULE ORAL at 08:30

## 2018-04-18 RX ADMIN — Medication 10 ML: at 02:00

## 2018-04-18 RX ADMIN — VERAPAMIL HYDROCHLORIDE 80 MG: 80 TABLET, FILM COATED ORAL at 08:20

## 2018-04-18 RX ADMIN — METHOCARBAMOL 1000 MG: 500 TABLET ORAL at 13:15

## 2018-04-18 RX ADMIN — Medication 10 ML: at 08:29

## 2018-04-18 RX ADMIN — OXYCODONE HYDROCHLORIDE 10 MG: 5 SOLUTION ORAL at 08:17

## 2018-04-18 RX ADMIN — Medication 10 ML: at 02:03

## 2018-04-18 RX ADMIN — PROPOFOL 10 MCG/KG/MIN: 10 INJECTION, EMULSION INTRAVENOUS at 19:15

## 2018-04-18 RX ADMIN — METHOCARBAMOL 1000 MG: 500 TABLET ORAL at 16:36

## 2018-04-18 RX ADMIN — Medication 10 ML: at 18:49

## 2018-04-18 RX ADMIN — PHENOBARBITAL 97.2 MG: 32.4 TABLET ORAL at 08:28

## 2018-04-18 RX ADMIN — CHLORHEXIDINE GLUCONATE 15 ML: 1.2 RINSE ORAL at 20:34

## 2018-04-18 ASSESSMENT — PAIN SCALES - GENERAL
PAINLEVEL_OUTOF10: 9
PAINLEVEL_OUTOF10: 6

## 2018-04-18 ASSESSMENT — PULMONARY FUNCTION TESTS
PIF_VALUE: 24
PIF_VALUE: 21
PIF_VALUE: 21
PIF_VALUE: 17
PIF_VALUE: 19
PIF_VALUE: 19
PIF_VALUE: 22
PIF_VALUE: 21
PIF_VALUE: 19

## 2018-04-18 ASSESSMENT — PAIN DESCRIPTION - PAIN TYPE: TYPE: SURGICAL PAIN;ACUTE PAIN

## 2018-04-18 ASSESSMENT — PAIN DESCRIPTION - LOCATION: LOCATION: LEG

## 2018-04-18 ASSESSMENT — PAIN DESCRIPTION - DESCRIPTORS: DESCRIPTORS: DISCOMFORT

## 2018-04-18 ASSESSMENT — PAIN DESCRIPTION - ORIENTATION: ORIENTATION: LEFT

## 2018-04-19 ENCOUNTER — APPOINTMENT (OUTPATIENT)
Dept: GENERAL RADIOLOGY | Age: 53
DRG: 003 | End: 2018-04-19
Payer: MEDICARE

## 2018-04-19 ENCOUNTER — ANESTHESIA EVENT (OUTPATIENT)
Dept: OPERATING ROOM | Age: 53
DRG: 003 | End: 2018-04-19
Payer: MEDICARE

## 2018-04-19 ENCOUNTER — ANESTHESIA (OUTPATIENT)
Dept: OPERATING ROOM | Age: 53
DRG: 003 | End: 2018-04-19
Payer: MEDICARE

## 2018-04-19 VITALS
TEMPERATURE: 101.8 F | DIASTOLIC BLOOD PRESSURE: 48 MMHG | OXYGEN SATURATION: 97 % | RESPIRATION RATE: 13 BRPM | SYSTOLIC BLOOD PRESSURE: 98 MMHG

## 2018-04-19 LAB
AADO2: 136.9 MMHG
ALBUMIN SERPL-MCNC: 2.7 G/DL (ref 3.5–5.2)
ALP BLD-CCNC: 100 U/L (ref 40–129)
ALT SERPL-CCNC: 83 U/L (ref 0–40)
ANION GAP SERPL CALCULATED.3IONS-SCNC: 13 MMOL/L (ref 7–16)
AST SERPL-CCNC: 56 U/L (ref 0–39)
B.E.: 5.5 MMOL/L (ref -3–3)
BASOPHILS ABSOLUTE: 0.03 E9/L (ref 0–0.2)
BASOPHILS RELATIVE PERCENT: 0.3 % (ref 0–2)
BILIRUB SERPL-MCNC: 0.5 MG/DL (ref 0–1.2)
BUN BLDV-MCNC: 18 MG/DL (ref 6–20)
CALCIUM IONIZED: 1.22 MMOL/L (ref 1.15–1.33)
CALCIUM SERPL-MCNC: 8.7 MG/DL (ref 8.6–10.2)
CHLORIDE BLD-SCNC: 96 MMOL/L (ref 98–107)
CO2: 27 MMOL/L (ref 22–29)
COHB: 1.4 % (ref 0–1.5)
CREAT SERPL-MCNC: 0.7 MG/DL (ref 0.7–1.2)
CRITICAL: ABNORMAL
CULTURE, RESPIRATORY: ABNORMAL
CULTURE, RESPIRATORY: ABNORMAL
DATE ANALYZED: ABNORMAL
DATE OF COLLECTION: ABNORMAL
EOSINOPHILS ABSOLUTE: 0.01 E9/L (ref 0.05–0.5)
EOSINOPHILS RELATIVE PERCENT: 0.1 % (ref 0–6)
FIO2: 40 %
GFR AFRICAN AMERICAN: >60
GFR NON-AFRICAN AMERICAN: >60 ML/MIN/1.73
GLUCOSE BLD-MCNC: 111 MG/DL (ref 74–109)
HCO3: 28.9 MMOL/L (ref 22–26)
HCT VFR BLD CALC: 24.1 % (ref 37–54)
HEMOGLOBIN: 7.9 G/DL (ref 12.5–16.5)
HHB: 2.1 % (ref 0–5)
IMMATURE GRANULOCYTES #: 0.15 E9/L
IMMATURE GRANULOCYTES %: 1.5 % (ref 0–5)
LAB: ABNORMAL
LYMPHOCYTES ABSOLUTE: 1.52 E9/L (ref 1.5–4)
LYMPHOCYTES RELATIVE PERCENT: 15.4 % (ref 20–42)
Lab: 515
MAGNESIUM: 2.2 MG/DL (ref 1.6–2.6)
MCH RBC QN AUTO: 30.3 PG (ref 26–35)
MCHC RBC AUTO-ENTMCNC: 32.8 % (ref 32–34.5)
MCV RBC AUTO: 92.3 FL (ref 80–99.9)
METER GLUCOSE: 104 MG/DL (ref 70–110)
METER GLUCOSE: 109 MG/DL (ref 70–110)
METER GLUCOSE: 119 MG/DL (ref 70–110)
METHB: 0.1 % (ref 0–1.5)
MODE: ABNORMAL
MONOCYTES ABSOLUTE: 0.91 E9/L (ref 0.1–0.95)
MONOCYTES RELATIVE PERCENT: 9.2 % (ref 2–12)
NEUTROPHILS ABSOLUTE: 7.26 E9/L (ref 1.8–7.3)
NEUTROPHILS RELATIVE PERCENT: 73.5 % (ref 43–80)
O2 CONTENT: 11.9 ML/DL
O2 SATURATION: 97.9 % (ref 92–98.5)
O2HB: 96.4 % (ref 94–97)
OPERATOR ID: ABNORMAL
ORGANISM: ABNORMAL
PATIENT TEMP: 37 C
PCO2: 37.7 MMHG (ref 35–45)
PDW BLD-RTO: 14.2 FL (ref 11.5–15)
PEEP/CPAP: 8 CMH?O
PFO2: 2.32 MMHG/%
PH BLOOD GAS: 7.5 (ref 7.35–7.45)
PHOSPHORUS: 3.3 MG/DL (ref 2.5–4.5)
PLATELET # BLD: 439 E9/L (ref 130–450)
PMV BLD AUTO: 9.1 FL (ref 7–12)
PO2: 92.9 MMHG (ref 60–100)
POTASSIUM REFLEX MAGNESIUM: 3.6 MMOL/L (ref 3.5–5)
PS: 10 CMH20
RBC # BLD: 2.61 E12/L (ref 3.8–5.8)
RI(T): 147 %
SMEAR, RESPIRATORY: ABNORMAL
SODIUM BLD-SCNC: 136 MMOL/L (ref 132–146)
SOURCE, BLOOD GAS: ABNORMAL
THB: 8.7 G/DL (ref 11.5–16.5)
TIME ANALYZED: 521
TOTAL PROTEIN: 6.4 G/DL (ref 6.4–8.3)
WBC # BLD: 9.9 E9/L (ref 4.5–11.5)

## 2018-04-19 PROCEDURE — 6370000000 HC RX 637 (ALT 250 FOR IP): Performed by: STUDENT IN AN ORGANIZED HEALTH CARE EDUCATION/TRAINING PROGRAM

## 2018-04-19 PROCEDURE — 6360000002 HC RX W HCPCS: Performed by: NURSE ANESTHETIST, CERTIFIED REGISTERED

## 2018-04-19 PROCEDURE — 6360000002 HC RX W HCPCS: Performed by: STUDENT IN AN ORGANIZED HEALTH CARE EDUCATION/TRAINING PROGRAM

## 2018-04-19 PROCEDURE — 36592 COLLECT BLOOD FROM PICC: CPT

## 2018-04-19 PROCEDURE — 2500000003 HC RX 250 WO HCPCS

## 2018-04-19 PROCEDURE — 83735 ASSAY OF MAGNESIUM: CPT

## 2018-04-19 PROCEDURE — 3700000000 HC ANESTHESIA ATTENDED CARE: Performed by: ORTHOPAEDIC SURGERY

## 2018-04-19 PROCEDURE — 82962 GLUCOSE BLOOD TEST: CPT

## 2018-04-19 PROCEDURE — 6370000000 HC RX 637 (ALT 250 FOR IP): Performed by: SURGERY

## 2018-04-19 PROCEDURE — 7100000001 HC PACU RECOVERY - ADDTL 15 MIN

## 2018-04-19 PROCEDURE — 2580000003 HC RX 258: Performed by: STUDENT IN AN ORGANIZED HEALTH CARE EDUCATION/TRAINING PROGRAM

## 2018-04-19 PROCEDURE — 84100 ASSAY OF PHOSPHORUS: CPT

## 2018-04-19 PROCEDURE — 3700000001 HC ADD 15 MINUTES (ANESTHESIA): Performed by: ORTHOPAEDIC SURGERY

## 2018-04-19 PROCEDURE — 2720000010 HC SURG SUPPLY STERILE: Performed by: ORTHOPAEDIC SURGERY

## 2018-04-19 PROCEDURE — 0QSL04Z REPOSITION RIGHT TARSAL WITH INTERNAL FIXATION DEVICE, OPEN APPROACH: ICD-10-PCS | Performed by: ORTHOPAEDIC SURGERY

## 2018-04-19 PROCEDURE — 2580000003 HC RX 258: Performed by: NURSE ANESTHETIST, CERTIFIED REGISTERED

## 2018-04-19 PROCEDURE — 82330 ASSAY OF CALCIUM: CPT

## 2018-04-19 PROCEDURE — 76000 FLUOROSCOPY <1 HR PHYS/QHP: CPT

## 2018-04-19 PROCEDURE — 71045 X-RAY EXAM CHEST 1 VIEW: CPT

## 2018-04-19 PROCEDURE — C1713 ANCHOR/SCREW BN/BN,TIS/BN: HCPCS | Performed by: ORTHOPAEDIC SURGERY

## 2018-04-19 PROCEDURE — 82805 BLOOD GASES W/O2 SATURATION: CPT

## 2018-04-19 PROCEDURE — 80053 COMPREHEN METABOLIC PANEL: CPT

## 2018-04-19 PROCEDURE — 28415 OPTX CALCANEAL FRACTURE: CPT | Performed by: ORTHOPAEDIC SURGERY

## 2018-04-19 PROCEDURE — 7100000000 HC PACU RECOVERY - FIRST 15 MIN

## 2018-04-19 PROCEDURE — 6360000002 HC RX W HCPCS

## 2018-04-19 PROCEDURE — 85025 COMPLETE CBC W/AUTO DIFF WBC: CPT

## 2018-04-19 PROCEDURE — 94003 VENT MGMT INPAT SUBQ DAY: CPT

## 2018-04-19 PROCEDURE — 99291 CRITICAL CARE FIRST HOUR: CPT | Performed by: SURGERY

## 2018-04-19 PROCEDURE — 2000000000 HC ICU R&B

## 2018-04-19 PROCEDURE — 2500000003 HC RX 250 WO HCPCS: Performed by: NURSE ANESTHETIST, CERTIFIED REGISTERED

## 2018-04-19 PROCEDURE — 36415 COLL VENOUS BLD VENIPUNCTURE: CPT

## 2018-04-19 PROCEDURE — 3600000005 HC SURGERY LEVEL 5 BASE: Performed by: ORTHOPAEDIC SURGERY

## 2018-04-19 PROCEDURE — 3600000015 HC SURGERY LEVEL 5 ADDTL 15MIN: Performed by: ORTHOPAEDIC SURGERY

## 2018-04-19 DEVICE — SCREW BNE L42MM DIA2.7MM ANK S STL ST VAR ANG LOK FULL THRD: Type: IMPLANTABLE DEVICE | Site: ANKLE | Status: FUNCTIONAL

## 2018-04-19 DEVICE — SCREW BNE L36MM DIA2.7MM ANK S STL ST VAR ANG LOK FULL THRD: Type: IMPLANTABLE DEVICE | Site: ANKLE | Status: FUNCTIONAL

## 2018-04-19 DEVICE — SCREW BNE L36MM DIA2.7MM CORT S STL ST FULL THRD FOR SM: Type: IMPLANTABLE DEVICE | Site: ANKLE | Status: FUNCTIONAL

## 2018-04-19 DEVICE — SCREW BNE L42MM DIA2.7MM MTPHSEAL EL S STL ST VAR ANG LOK: Type: IMPLANTABLE DEVICE | Site: ANKLE | Status: FUNCTIONAL

## 2018-04-19 DEVICE — SCREW BNE L38MM DIA2.7MM S STL ST VAR ANG LOK FULL THRD T8: Type: IMPLANTABLE DEVICE | Site: ANKLE | Status: FUNCTIONAL

## 2018-04-19 DEVICE — SCREW BNE L40MM DIA2.7MM ANK S STL ST VAR ANG LOK FULL THRD: Type: IMPLANTABLE DEVICE | Site: ANKLE | Status: FUNCTIONAL

## 2018-04-19 DEVICE — SCREW BNE L36MM DIA2.7MM MTPHSEAL EL S STL ST VAR ANG LOK: Type: IMPLANTABLE DEVICE | Site: ANKLE | Status: FUNCTIONAL

## 2018-04-19 DEVICE — GRAFT BNE SUB 15ML 1.7-10MM CANC CHIP MORSELIZED FRZ DRY: Type: IMPLANTABLE DEVICE | Site: ANKLE | Status: FUNCTIONAL

## 2018-04-19 DEVICE — PLATE BONE L56MM LNG STRL RT ANTEROLATERAL CALCNL S STL VAR: Type: IMPLANTABLE DEVICE | Site: ANKLE | Status: FUNCTIONAL

## 2018-04-19 RX ORDER — MIDAZOLAM HYDROCHLORIDE 1 MG/ML
INJECTION INTRAMUSCULAR; INTRAVENOUS PRN
Status: DISCONTINUED | OUTPATIENT
Start: 2018-04-19 | End: 2018-04-19 | Stop reason: SDUPTHER

## 2018-04-19 RX ORDER — NEOSTIGMINE METHYLSULFATE 1 MG/ML
INJECTION, SOLUTION INTRAVENOUS PRN
Status: DISCONTINUED | OUTPATIENT
Start: 2018-04-19 | End: 2018-04-19 | Stop reason: SDUPTHER

## 2018-04-19 RX ORDER — RISPERIDONE 1 MG/1
1 TABLET, FILM COATED ORAL 2 TIMES DAILY
Status: DISCONTINUED | OUTPATIENT
Start: 2018-04-19 | End: 2018-04-19

## 2018-04-19 RX ORDER — RISPERIDONE 1 MG/ML
1 SOLUTION ORAL 2 TIMES DAILY
Status: DISCONTINUED | OUTPATIENT
Start: 2018-04-19 | End: 2018-04-24

## 2018-04-19 RX ORDER — SODIUM CHLORIDE 9 MG/ML
INJECTION, SOLUTION INTRAVENOUS CONTINUOUS PRN
Status: DISCONTINUED | OUTPATIENT
Start: 2018-04-19 | End: 2018-04-19 | Stop reason: SDUPTHER

## 2018-04-19 RX ORDER — FENTANYL CITRATE 50 UG/ML
INJECTION, SOLUTION INTRAMUSCULAR; INTRAVENOUS PRN
Status: DISCONTINUED | OUTPATIENT
Start: 2018-04-19 | End: 2018-04-19 | Stop reason: SDUPTHER

## 2018-04-19 RX ORDER — LORAZEPAM 2 MG/ML
2 INJECTION INTRAMUSCULAR ONCE
Status: COMPLETED | OUTPATIENT
Start: 2018-04-19 | End: 2018-04-19

## 2018-04-19 RX ORDER — ACETAMINOPHEN 160 MG/5ML
650 SOLUTION ORAL EVERY 4 HOURS PRN
Status: DISCONTINUED | OUTPATIENT
Start: 2018-04-19 | End: 2018-04-26

## 2018-04-19 RX ORDER — GLYCOPYRROLATE 0.2 MG/ML
INJECTION INTRAMUSCULAR; INTRAVENOUS PRN
Status: DISCONTINUED | OUTPATIENT
Start: 2018-04-19 | End: 2018-04-19 | Stop reason: SDUPTHER

## 2018-04-19 RX ORDER — VECURONIUM BROMIDE 1 MG/ML
INJECTION, POWDER, LYOPHILIZED, FOR SOLUTION INTRAVENOUS PRN
Status: DISCONTINUED | OUTPATIENT
Start: 2018-04-19 | End: 2018-04-19 | Stop reason: SDUPTHER

## 2018-04-19 RX ADMIN — OXYCODONE HYDROCHLORIDE 15 MG: 5 SOLUTION ORAL at 00:40

## 2018-04-19 RX ADMIN — DOCUSATE SODIUM 100 MG: 50 LIQUID ORAL at 21:00

## 2018-04-19 RX ADMIN — ACETAMINOPHEN 650 MG: 325 TABLET, FILM COATED ORAL at 06:30

## 2018-04-19 RX ADMIN — LOSARTAN POTASSIUM 100 MG: 50 TABLET, FILM COATED ORAL at 08:26

## 2018-04-19 RX ADMIN — PHENOBARBITAL 97.2 MG: 32.4 TABLET ORAL at 08:25

## 2018-04-19 RX ADMIN — OXYCODONE HYDROCHLORIDE 5 MG: 5 SOLUTION ORAL at 19:50

## 2018-04-19 RX ADMIN — Medication 10 ML: at 21:00

## 2018-04-19 RX ADMIN — PROPOFOL 10 MCG/KG/MIN: 10 INJECTION, EMULSION INTRAVENOUS at 08:12

## 2018-04-19 RX ADMIN — GLYCOPYRROLATE 0.6 MG: 0.2 INJECTION, SOLUTION INTRAMUSCULAR; INTRAVENOUS at 17:31

## 2018-04-19 RX ADMIN — CEFAZOLIN SODIUM 2 G: 2 SOLUTION INTRAVENOUS at 15:47

## 2018-04-19 RX ADMIN — PHENYLEPHRINE HYDROCHLORIDE 100 MCG: 10 INJECTION INTRAMUSCULAR; INTRAVENOUS; SUBCUTANEOUS at 16:18

## 2018-04-19 RX ADMIN — ENOXAPARIN SODIUM 30 MG: 30 INJECTION SUBCUTANEOUS at 08:27

## 2018-04-19 RX ADMIN — Medication 3 MG: at 17:31

## 2018-04-19 RX ADMIN — CARVEDILOL 25 MG: 25 TABLET, FILM COATED ORAL at 08:28

## 2018-04-19 RX ADMIN — RISPERIDONE 0.5 MG: 0.5 TABLET, FILM COATED ORAL at 08:27

## 2018-04-19 RX ADMIN — PROPOFOL 10 MCG/KG/MIN: 10 INJECTION, EMULSION INTRAVENOUS at 19:13

## 2018-04-19 RX ADMIN — CHLORHEXIDINE GLUCONATE 15 ML: 1.2 RINSE ORAL at 21:00

## 2018-04-19 RX ADMIN — VECURONIUM BROMIDE 8 MG: 1 INJECTION, POWDER, LYOPHILIZED, FOR SOLUTION INTRAVENOUS at 15:51

## 2018-04-19 RX ADMIN — OXYCODONE HYDROCHLORIDE 15 MG: 5 SOLUTION ORAL at 04:53

## 2018-04-19 RX ADMIN — ACETAMINOPHEN 650 MG: 325 TABLET, FILM COATED ORAL at 00:40

## 2018-04-19 RX ADMIN — OXYCODONE HYDROCHLORIDE 15 MG: 5 SOLUTION ORAL at 19:50

## 2018-04-19 RX ADMIN — METHOCARBAMOL 1000 MG: 500 TABLET ORAL at 13:17

## 2018-04-19 RX ADMIN — PREGABALIN 75 MG: 75 CAPSULE ORAL at 21:00

## 2018-04-19 RX ADMIN — LORAZEPAM 2 MG: 2 INJECTION INTRAMUSCULAR; INTRAVENOUS at 01:38

## 2018-04-19 RX ADMIN — VERAPAMIL HYDROCHLORIDE 80 MG: 80 TABLET, FILM COATED ORAL at 22:24

## 2018-04-19 RX ADMIN — VERAPAMIL HYDROCHLORIDE 80 MG: 80 TABLET, FILM COATED ORAL at 13:17

## 2018-04-19 RX ADMIN — Medication 10 ML: at 08:15

## 2018-04-19 RX ADMIN — OXYCODONE HYDROCHLORIDE 15 MG: 5 SOLUTION ORAL at 12:01

## 2018-04-19 RX ADMIN — PRAVASTATIN SODIUM 20 MG: 20 TABLET ORAL at 21:00

## 2018-04-19 RX ADMIN — FENTANYL CITRATE 50 MCG: 50 INJECTION, SOLUTION INTRAMUSCULAR; INTRAVENOUS at 16:19

## 2018-04-19 RX ADMIN — FENTANYL CITRATE 50 MCG: 50 INJECTION, SOLUTION INTRAMUSCULAR; INTRAVENOUS at 16:40

## 2018-04-19 RX ADMIN — CHLORHEXIDINE GLUCONATE 15 ML: 1.2 RINSE ORAL at 08:27

## 2018-04-19 RX ADMIN — FENTANYL CITRATE 100 MCG: 50 INJECTION, SOLUTION INTRAMUSCULAR; INTRAVENOUS at 17:49

## 2018-04-19 RX ADMIN — Medication 10 ML: at 01:41

## 2018-04-19 RX ADMIN — VECURONIUM BROMIDE 2 MG: 1 INJECTION, POWDER, LYOPHILIZED, FOR SOLUTION INTRAVENOUS at 16:15

## 2018-04-19 RX ADMIN — METHOCARBAMOL 1000 MG: 500 TABLET ORAL at 21:00

## 2018-04-19 RX ADMIN — Medication 10 ML: at 11:56

## 2018-04-19 RX ADMIN — OXYCODONE HYDROCHLORIDE 5 MG: 5 SOLUTION ORAL at 11:37

## 2018-04-19 RX ADMIN — SODIUM CHLORIDE: 9 INJECTION, SOLUTION INTRAVENOUS at 17:30

## 2018-04-19 RX ADMIN — MIDAZOLAM HYDROCHLORIDE 2 MG: 1 INJECTION, SOLUTION INTRAMUSCULAR; INTRAVENOUS at 15:42

## 2018-04-19 RX ADMIN — Medication 10 ML: at 15:29

## 2018-04-19 RX ADMIN — Medication 10 ML: at 10:00

## 2018-04-19 RX ADMIN — FENTANYL CITRATE 50 MCG: 50 INJECTION, SOLUTION INTRAMUSCULAR; INTRAVENOUS at 17:10

## 2018-04-19 RX ADMIN — SODIUM CHLORIDE: 9 INJECTION, SOLUTION INTRAVENOUS at 15:49

## 2018-04-19 RX ADMIN — Medication 10 ML: at 19:13

## 2018-04-19 RX ADMIN — FENTANYL CITRATE 50 MCG: 50 INJECTION, SOLUTION INTRAMUSCULAR; INTRAVENOUS at 16:50

## 2018-04-19 RX ADMIN — OXYCODONE HYDROCHLORIDE 5 MG: 5 SOLUTION ORAL at 15:03

## 2018-04-19 RX ADMIN — PANTOPRAZOLE SODIUM 40 MG: 40 GRANULE, DELAYED RELEASE ORAL at 06:30

## 2018-04-19 RX ADMIN — VERAPAMIL HYDROCHLORIDE 80 MG: 80 TABLET, FILM COATED ORAL at 06:30

## 2018-04-19 RX ADMIN — RISPERIDONE 1 MG: 1 SOLUTION ORAL at 21:00

## 2018-04-19 RX ADMIN — ENOXAPARIN SODIUM 30 MG: 30 INJECTION SUBCUTANEOUS at 21:00

## 2018-04-19 RX ADMIN — OXYCODONE HYDROCHLORIDE 15 MG: 5 SOLUTION ORAL at 08:14

## 2018-04-19 RX ADMIN — FENTANYL CITRATE 50 MCG: 50 INJECTION, SOLUTION INTRAMUSCULAR; INTRAVENOUS at 17:20

## 2018-04-19 RX ADMIN — CEFAZOLIN SODIUM 2 G: 2 SOLUTION INTRAVENOUS at 16:04

## 2018-04-19 RX ADMIN — Medication 10 ML: at 08:33

## 2018-04-19 ASSESSMENT — PULMONARY FUNCTION TESTS
PIF_VALUE: 10
PIF_VALUE: 24
PIF_VALUE: 25
PIF_VALUE: 19
PIF_VALUE: 3
PIF_VALUE: 25
PIF_VALUE: 19
PIF_VALUE: 24
PIF_VALUE: 25
PIF_VALUE: 24
PIF_VALUE: 10
PIF_VALUE: 10
PIF_VALUE: 24
PIF_VALUE: 21
PIF_VALUE: 25
PIF_VALUE: 25
PIF_VALUE: 19
PIF_VALUE: 10
PIF_VALUE: 24
PIF_VALUE: 26
PIF_VALUE: 24
PIF_VALUE: 9
PIF_VALUE: 26
PIF_VALUE: 25
PIF_VALUE: 19
PIF_VALUE: 10
PIF_VALUE: 24
PIF_VALUE: 25
PIF_VALUE: 25
PIF_VALUE: 10
PIF_VALUE: 24
PIF_VALUE: 25
PIF_VALUE: 25
PIF_VALUE: 24
PIF_VALUE: 25
PIF_VALUE: 10
PIF_VALUE: 23
PIF_VALUE: 8
PIF_VALUE: 24
PIF_VALUE: 24
PIF_VALUE: 26
PIF_VALUE: 10
PIF_VALUE: 25
PIF_VALUE: 10
PIF_VALUE: 24
PIF_VALUE: 25
PIF_VALUE: 9
PIF_VALUE: 25
PIF_VALUE: 24
PIF_VALUE: 10
PIF_VALUE: 24
PIF_VALUE: 10
PIF_VALUE: 5
PIF_VALUE: 24
PIF_VALUE: 24
PIF_VALUE: 25
PIF_VALUE: 19
PIF_VALUE: 24
PIF_VALUE: 25
PIF_VALUE: 24
PIF_VALUE: 25
PIF_VALUE: 19
PIF_VALUE: 18
PIF_VALUE: 26
PIF_VALUE: 25
PIF_VALUE: 9
PIF_VALUE: 24
PIF_VALUE: 25
PIF_VALUE: 10
PIF_VALUE: 25
PIF_VALUE: 25
PIF_VALUE: 10
PIF_VALUE: 24
PIF_VALUE: 10
PIF_VALUE: 24
PIF_VALUE: 24
PIF_VALUE: 25
PIF_VALUE: 10
PIF_VALUE: 25
PIF_VALUE: 10
PIF_VALUE: 24
PIF_VALUE: 25
PIF_VALUE: 25
PIF_VALUE: 21
PIF_VALUE: 9
PIF_VALUE: 19
PIF_VALUE: 25
PIF_VALUE: 19
PIF_VALUE: 11
PIF_VALUE: 7
PIF_VALUE: 24
PIF_VALUE: 10
PIF_VALUE: 10
PIF_VALUE: 19
PIF_VALUE: 8
PIF_VALUE: 25
PIF_VALUE: 25
PIF_VALUE: 26
PIF_VALUE: 25
PIF_VALUE: 25
PIF_VALUE: 10
PIF_VALUE: 25
PIF_VALUE: 25
PIF_VALUE: 8
PIF_VALUE: 24
PIF_VALUE: 26
PIF_VALUE: 24
PIF_VALUE: 25
PIF_VALUE: 24
PIF_VALUE: 25
PIF_VALUE: 24
PIF_VALUE: 25
PIF_VALUE: 19
PIF_VALUE: 25
PIF_VALUE: 26
PIF_VALUE: 24
PIF_VALUE: 25
PIF_VALUE: 24
PIF_VALUE: 10
PIF_VALUE: 24

## 2018-04-19 ASSESSMENT — PAIN SCALES - GENERAL
PAINLEVEL_OUTOF10: 6
PAINLEVEL_OUTOF10: 4
PAINLEVEL_OUTOF10: 0
PAINLEVEL_OUTOF10: 5

## 2018-04-20 ENCOUNTER — APPOINTMENT (OUTPATIENT)
Dept: GENERAL RADIOLOGY | Age: 53
DRG: 003 | End: 2018-04-20
Payer: MEDICARE

## 2018-04-20 ENCOUNTER — APPOINTMENT (OUTPATIENT)
Dept: ULTRASOUND IMAGING | Age: 53
DRG: 003 | End: 2018-04-20
Payer: MEDICARE

## 2018-04-20 LAB
AADO2: 88.8 MMHG
ALBUMIN SERPL-MCNC: 2.7 G/DL (ref 3.5–5.2)
ALP BLD-CCNC: 145 U/L (ref 40–129)
ALT SERPL-CCNC: 130 U/L (ref 0–40)
ANION GAP SERPL CALCULATED.3IONS-SCNC: 15 MMOL/L (ref 7–16)
AST SERPL-CCNC: 90 U/L (ref 0–39)
B.E.: 2.2 MMOL/L (ref -3–3)
BASOPHILS ABSOLUTE: 0.03 E9/L (ref 0–0.2)
BASOPHILS RELATIVE PERCENT: 0.3 % (ref 0–2)
BILIRUB SERPL-MCNC: 0.9 MG/DL (ref 0–1.2)
BUN BLDV-MCNC: 17 MG/DL (ref 6–20)
CALCIUM SERPL-MCNC: 8.5 MG/DL (ref 8.6–10.2)
CHLORIDE BLD-SCNC: 97 MMOL/L (ref 98–107)
CO2: 26 MMOL/L (ref 22–29)
COHB: 0.3 % (ref 0–1.5)
CREAT SERPL-MCNC: 0.6 MG/DL (ref 0.7–1.2)
CRITICAL: ABNORMAL
DATE ANALYZED: ABNORMAL
DATE OF COLLECTION: ABNORMAL
EOSINOPHILS ABSOLUTE: 0.03 E9/L (ref 0.05–0.5)
EOSINOPHILS RELATIVE PERCENT: 0.3 % (ref 0–6)
FIO2: 40 %
GFR AFRICAN AMERICAN: >60
GFR NON-AFRICAN AMERICAN: >60 ML/MIN/1.73
GLUCOSE BLD-MCNC: 134 MG/DL (ref 74–109)
HCO3: 25.5 MMOL/L (ref 22–26)
HCT VFR BLD CALC: 24.1 % (ref 37–54)
HEMOGLOBIN: 7.7 G/DL (ref 12.5–16.5)
HHB: 0.9 % (ref 0–5)
IMMATURE GRANULOCYTES #: 0.14 E9/L
IMMATURE GRANULOCYTES %: 1.4 % (ref 0–5)
LAB: ABNORMAL
LYMPHOCYTES ABSOLUTE: 0.93 E9/L (ref 1.5–4)
LYMPHOCYTES RELATIVE PERCENT: 9.1 % (ref 20–42)
Lab: 520
MAGNESIUM: 2.3 MG/DL (ref 1.6–2.6)
MCH RBC QN AUTO: 29.5 PG (ref 26–35)
MCHC RBC AUTO-ENTMCNC: 32 % (ref 32–34.5)
MCV RBC AUTO: 92.3 FL (ref 80–99.9)
METER GLUCOSE: 113 MG/DL (ref 70–110)
METER GLUCOSE: 144 MG/DL (ref 70–110)
METER GLUCOSE: 155 MG/DL (ref 70–110)
METHB: 0.3 % (ref 0–1.5)
MODE: ABNORMAL
MONOCYTES ABSOLUTE: 0.89 E9/L (ref 0.1–0.95)
MONOCYTES RELATIVE PERCENT: 8.7 % (ref 2–12)
NEUTROPHILS ABSOLUTE: 8.22 E9/L (ref 1.8–7.3)
NEUTROPHILS RELATIVE PERCENT: 80.2 % (ref 43–80)
O2 CONTENT: 13.5 ML/DL
O2 SATURATION: 99.1 % (ref 92–98.5)
O2HB: 98.5 % (ref 94–97)
OPERATOR ID: ABNORMAL
PATIENT TEMP: 37 C
PCO2: 34.5 MMHG (ref 35–45)
PDW BLD-RTO: 13.7 FL (ref 11.5–15)
PEEP/CPAP: 8 CMH?O
PFO2: 3.67 MMHG/%
PH BLOOD GAS: 7.49 (ref 7.35–7.45)
PHOSPHORUS: 2.8 MG/DL (ref 2.5–4.5)
PLATELET # BLD: 500 E9/L (ref 130–450)
PMV BLD AUTO: 9.3 FL (ref 7–12)
PO2: 146.7 MMHG (ref 60–100)
POTASSIUM REFLEX MAGNESIUM: 3.6 MMOL/L (ref 3.5–5)
PS: 10 CMH20
RBC # BLD: 2.61 E12/L (ref 3.8–5.8)
RI(T): 61 %
SODIUM BLD-SCNC: 138 MMOL/L (ref 132–146)
SOURCE, BLOOD GAS: ABNORMAL
THB: 9.5 G/DL (ref 11.5–16.5)
TIME ANALYZED: 525
TOTAL PROTEIN: 6.5 G/DL (ref 6.4–8.3)
WBC # BLD: 10.2 E9/L (ref 4.5–11.5)

## 2018-04-20 PROCEDURE — 93970 EXTREMITY STUDY: CPT

## 2018-04-20 PROCEDURE — 2000000000 HC ICU R&B

## 2018-04-20 PROCEDURE — 85025 COMPLETE CBC W/AUTO DIFF WBC: CPT

## 2018-04-20 PROCEDURE — 94664 DEMO&/EVAL PT USE INHALER: CPT

## 2018-04-20 PROCEDURE — 36415 COLL VENOUS BLD VENIPUNCTURE: CPT

## 2018-04-20 PROCEDURE — 84100 ASSAY OF PHOSPHORUS: CPT

## 2018-04-20 PROCEDURE — 6370000000 HC RX 637 (ALT 250 FOR IP): Performed by: SURGERY

## 2018-04-20 PROCEDURE — 6370000000 HC RX 637 (ALT 250 FOR IP): Performed by: STUDENT IN AN ORGANIZED HEALTH CARE EDUCATION/TRAINING PROGRAM

## 2018-04-20 PROCEDURE — 99291 CRITICAL CARE FIRST HOUR: CPT | Performed by: SURGERY

## 2018-04-20 PROCEDURE — 71045 X-RAY EXAM CHEST 1 VIEW: CPT

## 2018-04-20 PROCEDURE — 97535 SELF CARE MNGMENT TRAINING: CPT

## 2018-04-20 PROCEDURE — 94003 VENT MGMT INPAT SUBQ DAY: CPT

## 2018-04-20 PROCEDURE — 83735 ASSAY OF MAGNESIUM: CPT

## 2018-04-20 PROCEDURE — 97140 MANUAL THERAPY 1/> REGIONS: CPT

## 2018-04-20 PROCEDURE — 97112 NEUROMUSCULAR REEDUCATION: CPT

## 2018-04-20 PROCEDURE — 82962 GLUCOSE BLOOD TEST: CPT

## 2018-04-20 PROCEDURE — 94640 AIRWAY INHALATION TREATMENT: CPT

## 2018-04-20 PROCEDURE — 2580000003 HC RX 258: Performed by: STUDENT IN AN ORGANIZED HEALTH CARE EDUCATION/TRAINING PROGRAM

## 2018-04-20 PROCEDURE — 6360000002 HC RX W HCPCS: Performed by: STUDENT IN AN ORGANIZED HEALTH CARE EDUCATION/TRAINING PROGRAM

## 2018-04-20 PROCEDURE — 82805 BLOOD GASES W/O2 SATURATION: CPT

## 2018-04-20 PROCEDURE — 97110 THERAPEUTIC EXERCISES: CPT

## 2018-04-20 PROCEDURE — 80053 COMPREHEN METABOLIC PANEL: CPT

## 2018-04-20 PROCEDURE — 97530 THERAPEUTIC ACTIVITIES: CPT

## 2018-04-20 PROCEDURE — 73650 X-RAY EXAM OF HEEL: CPT

## 2018-04-20 PROCEDURE — 2700000000 HC OXYGEN THERAPY PER DAY

## 2018-04-20 RX ORDER — IPRATROPIUM BROMIDE AND ALBUTEROL SULFATE 2.5; .5 MG/3ML; MG/3ML
1 SOLUTION RESPIRATORY (INHALATION)
Status: DISCONTINUED | OUTPATIENT
Start: 2018-04-20 | End: 2018-04-27 | Stop reason: HOSPADM

## 2018-04-20 RX ORDER — POTASSIUM CHLORIDE 20MEQ/15ML
40 LIQUID (ML) ORAL ONCE
Status: COMPLETED | OUTPATIENT
Start: 2018-04-20 | End: 2018-04-20

## 2018-04-20 RX ORDER — OXYCODONE HCL 5 MG/5 ML
10 SOLUTION, ORAL ORAL EVERY 4 HOURS PRN
Status: DISCONTINUED | OUTPATIENT
Start: 2018-04-20 | End: 2018-04-27 | Stop reason: HOSPADM

## 2018-04-20 RX ADMIN — POLYETHYLENE GLYCOL 3350 17 G: 17 POWDER, FOR SOLUTION ORAL at 08:50

## 2018-04-20 RX ADMIN — PROPOFOL 10 MCG/KG/MIN: 10 INJECTION, EMULSION INTRAVENOUS at 06:00

## 2018-04-20 RX ADMIN — VERAPAMIL HYDROCHLORIDE 80 MG: 80 TABLET, FILM COATED ORAL at 22:09

## 2018-04-20 RX ADMIN — METHOCARBAMOL 1000 MG: 500 TABLET ORAL at 12:47

## 2018-04-20 RX ADMIN — PREGABALIN 75 MG: 75 CAPSULE ORAL at 08:50

## 2018-04-20 RX ADMIN — ACETAMINOPHEN 650 MG: 650 SOLUTION ORAL at 20:37

## 2018-04-20 RX ADMIN — PANTOPRAZOLE SODIUM 40 MG: 40 GRANULE, DELAYED RELEASE ORAL at 06:59

## 2018-04-20 RX ADMIN — CHLORHEXIDINE GLUCONATE 15 ML: 1.2 RINSE ORAL at 20:37

## 2018-04-20 RX ADMIN — STANDARDIZED SENNA CONCENTRATE AND DOCUSATE SODIUM 1 TABLET: 8.6; 5 TABLET, FILM COATED ORAL at 08:50

## 2018-04-20 RX ADMIN — METHOCARBAMOL 1000 MG: 500 TABLET ORAL at 20:38

## 2018-04-20 RX ADMIN — IPRATROPIUM BROMIDE AND ALBUTEROL SULFATE 1 AMPULE: 2.5; .5 SOLUTION RESPIRATORY (INHALATION) at 21:17

## 2018-04-20 RX ADMIN — Medication 10 ML: at 08:51

## 2018-04-20 RX ADMIN — OXYCODONE HYDROCHLORIDE 15 MG: 5 SOLUTION ORAL at 07:58

## 2018-04-20 RX ADMIN — OXYCODONE HYDROCHLORIDE 15 MG: 5 SOLUTION ORAL at 15:54

## 2018-04-20 RX ADMIN — OXYCODONE HYDROCHLORIDE 15 MG: 5 SOLUTION ORAL at 00:44

## 2018-04-20 RX ADMIN — POTASSIUM CHLORIDE 40 MEQ: 20 SOLUTION ORAL at 07:07

## 2018-04-20 RX ADMIN — ACETAMINOPHEN 650 MG: 650 SOLUTION ORAL at 00:44

## 2018-04-20 RX ADMIN — OXYCODONE HYDROCHLORIDE 15 MG: 5 SOLUTION ORAL at 23:56

## 2018-04-20 RX ADMIN — CARVEDILOL 25 MG: 25 TABLET, FILM COATED ORAL at 07:58

## 2018-04-20 RX ADMIN — OXYCODONE HYDROCHLORIDE 15 MG: 5 SOLUTION ORAL at 03:54

## 2018-04-20 RX ADMIN — MULTIVIT AND MINERALS-FERROUS GLUCONATE 9 MG IRON/15 ML ORAL LIQUID 15 ML: at 08:50

## 2018-04-20 RX ADMIN — VERAPAMIL HYDROCHLORIDE 80 MG: 80 TABLET, FILM COATED ORAL at 06:59

## 2018-04-20 RX ADMIN — ACETAMINOPHEN 650 MG: 650 SOLUTION ORAL at 03:54

## 2018-04-20 RX ADMIN — PREGABALIN 75 MG: 75 CAPSULE ORAL at 20:37

## 2018-04-20 RX ADMIN — OXYCODONE HYDROCHLORIDE 5 MG: 5 SOLUTION ORAL at 00:44

## 2018-04-20 RX ADMIN — ENOXAPARIN SODIUM 30 MG: 30 INJECTION SUBCUTANEOUS at 20:37

## 2018-04-20 RX ADMIN — IPRATROPIUM BROMIDE AND ALBUTEROL SULFATE 1 AMPULE: 2.5; .5 SOLUTION RESPIRATORY (INHALATION) at 12:22

## 2018-04-20 RX ADMIN — OXYCODONE HYDROCHLORIDE 10 MG: 5 SOLUTION ORAL at 22:45

## 2018-04-20 RX ADMIN — RISPERIDONE 1 MG: 1 SOLUTION ORAL at 20:40

## 2018-04-20 RX ADMIN — OXYCODONE HYDROCHLORIDE 15 MG: 5 SOLUTION ORAL at 20:37

## 2018-04-20 RX ADMIN — DOCUSATE SODIUM 100 MG: 50 LIQUID ORAL at 20:38

## 2018-04-20 RX ADMIN — METHOCARBAMOL 1000 MG: 500 TABLET ORAL at 16:44

## 2018-04-20 RX ADMIN — PRAVASTATIN SODIUM 20 MG: 20 TABLET ORAL at 20:38

## 2018-04-20 RX ADMIN — CARVEDILOL 25 MG: 25 TABLET, FILM COATED ORAL at 16:44

## 2018-04-20 RX ADMIN — OXYCODONE HYDROCHLORIDE 15 MG: 5 SOLUTION ORAL at 12:14

## 2018-04-20 RX ADMIN — IPRATROPIUM BROMIDE AND ALBUTEROL SULFATE 1 AMPULE: 2.5; .5 SOLUTION RESPIRATORY (INHALATION) at 16:00

## 2018-04-20 RX ADMIN — DOCUSATE SODIUM 100 MG: 50 LIQUID ORAL at 08:50

## 2018-04-20 RX ADMIN — ENOXAPARIN SODIUM 30 MG: 30 INJECTION SUBCUTANEOUS at 08:50

## 2018-04-20 RX ADMIN — VERAPAMIL HYDROCHLORIDE 80 MG: 80 TABLET, FILM COATED ORAL at 14:30

## 2018-04-20 RX ADMIN — LOSARTAN POTASSIUM 100 MG: 50 TABLET, FILM COATED ORAL at 08:49

## 2018-04-20 RX ADMIN — RISPERIDONE 1 MG: 1 SOLUTION ORAL at 08:50

## 2018-04-20 RX ADMIN — METHOCARBAMOL 1000 MG: 500 TABLET ORAL at 08:49

## 2018-04-20 RX ADMIN — CHLORHEXIDINE GLUCONATE 15 ML: 1.2 RINSE ORAL at 08:51

## 2018-04-20 RX ADMIN — OXYCODONE HYDROCHLORIDE 5 MG: 5 SOLUTION ORAL at 03:55

## 2018-04-20 RX ADMIN — Medication 10 ML: at 20:45

## 2018-04-20 ASSESSMENT — PAIN SCALES - GENERAL
PAINLEVEL_OUTOF10: 5
PAINLEVEL_OUTOF10: 3
PAINLEVEL_OUTOF10: 3
PAINLEVEL_OUTOF10: 6
PAINLEVEL_OUTOF10: 4
PAINLEVEL_OUTOF10: 4

## 2018-04-20 ASSESSMENT — PULMONARY FUNCTION TESTS
PIF_VALUE: 18
PIF_VALUE: 18
PIF_VALUE: 19
PIF_VALUE: 18
PIF_VALUE: 19

## 2018-04-21 ENCOUNTER — APPOINTMENT (OUTPATIENT)
Dept: GENERAL RADIOLOGY | Age: 53
DRG: 003 | End: 2018-04-21
Payer: MEDICARE

## 2018-04-21 LAB
ALBUMIN SERPL-MCNC: 2.9 G/DL (ref 3.5–5.2)
ALP BLD-CCNC: 147 U/L (ref 40–129)
ALT SERPL-CCNC: 126 U/L (ref 0–40)
ANION GAP SERPL CALCULATED.3IONS-SCNC: 19 MMOL/L (ref 7–16)
AST SERPL-CCNC: 62 U/L (ref 0–39)
BASOPHILS ABSOLUTE: 0.02 E9/L (ref 0–0.2)
BASOPHILS RELATIVE PERCENT: 0.2 % (ref 0–2)
BILIRUB SERPL-MCNC: 0.7 MG/DL (ref 0–1.2)
BUN BLDV-MCNC: 16 MG/DL (ref 6–20)
CALCIUM SERPL-MCNC: 8.8 MG/DL (ref 8.6–10.2)
CHLORIDE BLD-SCNC: 97 MMOL/L (ref 98–107)
CO2: 24 MMOL/L (ref 22–29)
CREAT SERPL-MCNC: 0.6 MG/DL (ref 0.7–1.2)
EOSINOPHILS ABSOLUTE: 0.07 E9/L (ref 0.05–0.5)
EOSINOPHILS RELATIVE PERCENT: 0.6 % (ref 0–6)
GFR AFRICAN AMERICAN: >60
GFR NON-AFRICAN AMERICAN: >60 ML/MIN/1.73
GLUCOSE BLD-MCNC: 90 MG/DL (ref 74–109)
HCT VFR BLD CALC: 24.4 % (ref 37–54)
HEMOGLOBIN: 7.8 G/DL (ref 12.5–16.5)
IMMATURE GRANULOCYTES #: 0.12 E9/L
IMMATURE GRANULOCYTES %: 1 % (ref 0–5)
LYMPHOCYTES ABSOLUTE: 1.03 E9/L (ref 1.5–4)
LYMPHOCYTES RELATIVE PERCENT: 9 % (ref 20–42)
MAGNESIUM: 2.2 MG/DL (ref 1.6–2.6)
MCH RBC QN AUTO: 30.4 PG (ref 26–35)
MCHC RBC AUTO-ENTMCNC: 32 % (ref 32–34.5)
MCV RBC AUTO: 94.9 FL (ref 80–99.9)
METER GLUCOSE: 116 MG/DL (ref 70–110)
METER GLUCOSE: 133 MG/DL (ref 70–110)
MONOCYTES ABSOLUTE: 0.84 E9/L (ref 0.1–0.95)
MONOCYTES RELATIVE PERCENT: 7.3 % (ref 2–12)
NEUTROPHILS ABSOLUTE: 9.35 E9/L (ref 1.8–7.3)
NEUTROPHILS RELATIVE PERCENT: 81.9 % (ref 43–80)
PDW BLD-RTO: 13.8 FL (ref 11.5–15)
PHOSPHORUS: 2 MG/DL (ref 2.5–4.5)
PLATELET # BLD: 549 E9/L (ref 130–450)
PMV BLD AUTO: 10.2 FL (ref 7–12)
POTASSIUM REFLEX MAGNESIUM: 4.1 MMOL/L (ref 3.5–5)
RBC # BLD: 2.57 E12/L (ref 3.8–5.8)
SODIUM BLD-SCNC: 140 MMOL/L (ref 132–146)
TOTAL PROTEIN: 6.7 G/DL (ref 6.4–8.3)
WBC # BLD: 11.4 E9/L (ref 4.5–11.5)

## 2018-04-21 PROCEDURE — 82962 GLUCOSE BLOOD TEST: CPT

## 2018-04-21 PROCEDURE — 2700000000 HC OXYGEN THERAPY PER DAY

## 2018-04-21 PROCEDURE — 71045 X-RAY EXAM CHEST 1 VIEW: CPT

## 2018-04-21 PROCEDURE — 2580000003 HC RX 258: Performed by: STUDENT IN AN ORGANIZED HEALTH CARE EDUCATION/TRAINING PROGRAM

## 2018-04-21 PROCEDURE — 6370000000 HC RX 637 (ALT 250 FOR IP): Performed by: STUDENT IN AN ORGANIZED HEALTH CARE EDUCATION/TRAINING PROGRAM

## 2018-04-21 PROCEDURE — 6360000002 HC RX W HCPCS: Performed by: STUDENT IN AN ORGANIZED HEALTH CARE EDUCATION/TRAINING PROGRAM

## 2018-04-21 PROCEDURE — 92610 EVALUATE SWALLOWING FUNCTION: CPT

## 2018-04-21 PROCEDURE — 99291 CRITICAL CARE FIRST HOUR: CPT | Performed by: SURGERY

## 2018-04-21 PROCEDURE — 31502 CHANGE OF WINDPIPE AIRWAY: CPT | Performed by: NURSE PRACTITIONER

## 2018-04-21 PROCEDURE — 36415 COLL VENOUS BLD VENIPUNCTURE: CPT

## 2018-04-21 PROCEDURE — 85025 COMPLETE CBC W/AUTO DIFF WBC: CPT

## 2018-04-21 PROCEDURE — 6370000000 HC RX 637 (ALT 250 FOR IP): Performed by: SURGERY

## 2018-04-21 PROCEDURE — 83735 ASSAY OF MAGNESIUM: CPT

## 2018-04-21 PROCEDURE — 2000000000 HC ICU R&B

## 2018-04-21 PROCEDURE — 94003 VENT MGMT INPAT SUBQ DAY: CPT

## 2018-04-21 PROCEDURE — 84100 ASSAY OF PHOSPHORUS: CPT

## 2018-04-21 PROCEDURE — 80053 COMPREHEN METABOLIC PANEL: CPT

## 2018-04-21 PROCEDURE — 94640 AIRWAY INHALATION TREATMENT: CPT

## 2018-04-21 RX ORDER — MORPHINE SULFATE 4 MG/ML
4 INJECTION, SOLUTION INTRAMUSCULAR; INTRAVENOUS ONCE
Status: COMPLETED | OUTPATIENT
Start: 2018-04-21 | End: 2018-04-21

## 2018-04-21 RX ORDER — PREGABALIN 100 MG/1
100 CAPSULE ORAL 2 TIMES DAILY
Status: DISCONTINUED | OUTPATIENT
Start: 2018-04-21 | End: 2018-04-27 | Stop reason: HOSPADM

## 2018-04-21 RX ORDER — LANOLIN ALCOHOL/MO/W.PET/CERES
3 CREAM (GRAM) TOPICAL NIGHTLY PRN
Status: DISCONTINUED | OUTPATIENT
Start: 2018-04-21 | End: 2018-04-24

## 2018-04-21 RX ADMIN — ENOXAPARIN SODIUM 30 MG: 30 INJECTION SUBCUTANEOUS at 20:36

## 2018-04-21 RX ADMIN — Medication 10 ML: at 08:41

## 2018-04-21 RX ADMIN — IPRATROPIUM BROMIDE AND ALBUTEROL SULFATE 1 AMPULE: 2.5; .5 SOLUTION RESPIRATORY (INHALATION) at 12:12

## 2018-04-21 RX ADMIN — CARVEDILOL 25 MG: 25 TABLET, FILM COATED ORAL at 08:20

## 2018-04-21 RX ADMIN — METHOCARBAMOL 1000 MG: 500 TABLET ORAL at 08:23

## 2018-04-21 RX ADMIN — MULTIVIT AND MINERALS-FERROUS GLUCONATE 9 MG IRON/15 ML ORAL LIQUID 15 ML: at 08:24

## 2018-04-21 RX ADMIN — IPRATROPIUM BROMIDE AND ALBUTEROL SULFATE 1 AMPULE: 2.5; .5 SOLUTION RESPIRATORY (INHALATION) at 20:07

## 2018-04-21 RX ADMIN — RISPERIDONE 1 MG: 1 SOLUTION ORAL at 08:29

## 2018-04-21 RX ADMIN — IPRATROPIUM BROMIDE AND ALBUTEROL SULFATE 1 AMPULE: 2.5; .5 SOLUTION RESPIRATORY (INHALATION) at 08:41

## 2018-04-21 RX ADMIN — PRAVASTATIN SODIUM 20 MG: 20 TABLET ORAL at 20:51

## 2018-04-21 RX ADMIN — OXYCODONE HYDROCHLORIDE 15 MG: 5 SOLUTION ORAL at 08:27

## 2018-04-21 RX ADMIN — RISPERIDONE 1 MG: 1 SOLUTION ORAL at 20:38

## 2018-04-21 RX ADMIN — DOCUSATE SODIUM 100 MG: 50 LIQUID ORAL at 20:38

## 2018-04-21 RX ADMIN — MELATONIN TAB 3 MG 3 MG: 3 TAB at 22:13

## 2018-04-21 RX ADMIN — NYSTATIN 500000 UNITS: 100000 SUSPENSION ORAL at 13:00

## 2018-04-21 RX ADMIN — NYSTATIN 500000 UNITS: 100000 SUSPENSION ORAL at 22:13

## 2018-04-21 RX ADMIN — MORPHINE SULFATE 4 MG: 4 INJECTION INTRAVENOUS at 06:10

## 2018-04-21 RX ADMIN — CHLORHEXIDINE GLUCONATE 15 ML: 1.2 RINSE ORAL at 08:28

## 2018-04-21 RX ADMIN — OXYCODONE HYDROCHLORIDE 15 MG: 5 SOLUTION ORAL at 20:36

## 2018-04-21 RX ADMIN — BISACODYL 10 MG: 10 SUPPOSITORY RECTAL at 08:30

## 2018-04-21 RX ADMIN — DOCUSATE SODIUM 100 MG: 50 LIQUID ORAL at 08:21

## 2018-04-21 RX ADMIN — Medication 10 ML: at 20:36

## 2018-04-21 RX ADMIN — METHOCARBAMOL 1000 MG: 500 TABLET ORAL at 20:39

## 2018-04-21 RX ADMIN — ENOXAPARIN SODIUM 30 MG: 30 INJECTION SUBCUTANEOUS at 08:22

## 2018-04-21 RX ADMIN — POLYETHYLENE GLYCOL 3350 17 G: 17 POWDER, FOR SOLUTION ORAL at 08:30

## 2018-04-21 RX ADMIN — LOSARTAN POTASSIUM 100 MG: 50 TABLET, FILM COATED ORAL at 08:23

## 2018-04-21 RX ADMIN — PREGABALIN 100 MG: 100 CAPSULE ORAL at 20:36

## 2018-04-21 RX ADMIN — STANDARDIZED SENNA CONCENTRATE AND DOCUSATE SODIUM 1 TABLET: 8.6; 5 TABLET, FILM COATED ORAL at 08:32

## 2018-04-21 RX ADMIN — PREGABALIN 75 MG: 75 CAPSULE ORAL at 08:29

## 2018-04-21 RX ADMIN — IPRATROPIUM BROMIDE AND ALBUTEROL SULFATE 1 AMPULE: 2.5; .5 SOLUTION RESPIRATORY (INHALATION) at 16:48

## 2018-04-21 RX ADMIN — CHLORHEXIDINE GLUCONATE 15 ML: 1.2 RINSE ORAL at 20:36

## 2018-04-21 ASSESSMENT — PAIN SCALES - GENERAL
PAINLEVEL_OUTOF10: 10
PAINLEVEL_OUTOF10: 10

## 2018-04-21 ASSESSMENT — PAIN DESCRIPTION - LOCATION: LOCATION: LEG

## 2018-04-22 ENCOUNTER — APPOINTMENT (OUTPATIENT)
Dept: GENERAL RADIOLOGY | Age: 53
DRG: 003 | End: 2018-04-22
Payer: MEDICARE

## 2018-04-22 LAB
AADO2: 143.4 MMHG
B.E.: 4.7 MMOL/L (ref -3–3)
COHB: 1.6 % (ref 0–1.5)
CRITICAL: ABNORMAL
DATE ANALYZED: ABNORMAL
DATE OF COLLECTION: ABNORMAL
FIO2: 35 %
HCO3: 28.6 MMOL/L (ref 22–26)
HHB: 11 % (ref 0–5)
LAB: ABNORMAL
Lab: 425
METHB: 0.1 % (ref 0–1.5)
MODE: ABNORMAL
O2 CONTENT: 10.7 ML/DL
O2 SATURATION: 88.8 % (ref 92–98.5)
O2HB: 87.3 % (ref 94–97)
OPERATOR ID: ABNORMAL
PATIENT TEMP: 37 C
PCO2: 39.6 MMHG (ref 35–45)
PFO2: 1.56 MMHG/%
PH BLOOD GAS: 7.48 (ref 7.35–7.45)
PO2: 54.5 MMHG (ref 60–100)
RI(T): 263 %
SOURCE, BLOOD GAS: ABNORMAL
THB: 8.7 G/DL (ref 11.5–16.5)
TIME ANALYZED: 429

## 2018-04-22 PROCEDURE — 6370000000 HC RX 637 (ALT 250 FOR IP): Performed by: SURGERY

## 2018-04-22 PROCEDURE — 94640 AIRWAY INHALATION TREATMENT: CPT

## 2018-04-22 PROCEDURE — 6370000000 HC RX 637 (ALT 250 FOR IP): Performed by: STUDENT IN AN ORGANIZED HEALTH CARE EDUCATION/TRAINING PROGRAM

## 2018-04-22 PROCEDURE — 6360000002 HC RX W HCPCS: Performed by: STUDENT IN AN ORGANIZED HEALTH CARE EDUCATION/TRAINING PROGRAM

## 2018-04-22 PROCEDURE — 71045 X-RAY EXAM CHEST 1 VIEW: CPT

## 2018-04-22 PROCEDURE — 94150 VITAL CAPACITY TEST: CPT

## 2018-04-22 PROCEDURE — 99233 SBSQ HOSP IP/OBS HIGH 50: CPT | Performed by: SURGERY

## 2018-04-22 PROCEDURE — 2580000003 HC RX 258: Performed by: STUDENT IN AN ORGANIZED HEALTH CARE EDUCATION/TRAINING PROGRAM

## 2018-04-22 PROCEDURE — 82805 BLOOD GASES W/O2 SATURATION: CPT

## 2018-04-22 PROCEDURE — 2700000000 HC OXYGEN THERAPY PER DAY

## 2018-04-22 PROCEDURE — 1200000000 HC SEMI PRIVATE

## 2018-04-22 RX ORDER — OXYCODONE HCL 5 MG/5 ML
15 SOLUTION, ORAL ORAL EVERY 6 HOURS
Status: DISCONTINUED | OUTPATIENT
Start: 2018-04-22 | End: 2018-04-27 | Stop reason: HOSPADM

## 2018-04-22 RX ORDER — AMOXICILLIN AND CLAVULANATE POTASSIUM 875; 125 MG/1; MG/1
1 TABLET, FILM COATED ORAL EVERY 12 HOURS SCHEDULED
Status: DISCONTINUED | OUTPATIENT
Start: 2018-04-22 | End: 2018-04-27 | Stop reason: HOSPADM

## 2018-04-22 RX ADMIN — CHLORHEXIDINE GLUCONATE 15 ML: 1.2 RINSE ORAL at 07:52

## 2018-04-22 RX ADMIN — NYSTATIN 500000 UNITS: 100000 SUSPENSION ORAL at 08:35

## 2018-04-22 RX ADMIN — OXYCODONE HYDROCHLORIDE 15 MG: 5 SOLUTION ORAL at 19:59

## 2018-04-22 RX ADMIN — OXYCODONE HYDROCHLORIDE 10 MG: 5 SOLUTION ORAL at 11:06

## 2018-04-22 RX ADMIN — PREGABALIN 100 MG: 100 CAPSULE ORAL at 08:35

## 2018-04-22 RX ADMIN — PANTOPRAZOLE SODIUM 40 MG: 40 GRANULE, DELAYED RELEASE ORAL at 06:12

## 2018-04-22 RX ADMIN — AMOXICILLIN AND CLAVULANATE POTASSIUM 1 TABLET: 875; 125 TABLET, FILM COATED ORAL at 19:59

## 2018-04-22 RX ADMIN — VERAPAMIL HYDROCHLORIDE 80 MG: 80 TABLET, FILM COATED ORAL at 08:33

## 2018-04-22 RX ADMIN — POTASSIUM & SODIUM PHOSPHATES POWDER PACK 280-160-250 MG 250 MG: 280-160-250 PACK at 13:05

## 2018-04-22 RX ADMIN — OXYCODONE HYDROCHLORIDE 15 MG: 5 SOLUTION ORAL at 07:52

## 2018-04-22 RX ADMIN — Medication 10 ML: at 08:47

## 2018-04-22 RX ADMIN — PREGABALIN 100 MG: 100 CAPSULE ORAL at 20:00

## 2018-04-22 RX ADMIN — IPRATROPIUM BROMIDE AND ALBUTEROL SULFATE 1 AMPULE: 2.5; .5 SOLUTION RESPIRATORY (INHALATION) at 17:25

## 2018-04-22 RX ADMIN — Medication 10 ML: at 20:00

## 2018-04-22 RX ADMIN — NYSTATIN 500000 UNITS: 100000 SUSPENSION ORAL at 16:51

## 2018-04-22 RX ADMIN — METHOCARBAMOL 1000 MG: 500 TABLET ORAL at 13:05

## 2018-04-22 RX ADMIN — OXYCODONE HYDROCHLORIDE 15 MG: 5 SOLUTION ORAL at 13:50

## 2018-04-22 RX ADMIN — AMOXICILLIN AND CLAVULANATE POTASSIUM 1 TABLET: 875; 125 TABLET, FILM COATED ORAL at 11:46

## 2018-04-22 RX ADMIN — OXYCODONE HYDROCHLORIDE 15 MG: 5 SOLUTION ORAL at 04:10

## 2018-04-22 RX ADMIN — POLYETHYLENE GLYCOL 3350 17 G: 17 POWDER, FOR SOLUTION ORAL at 08:35

## 2018-04-22 RX ADMIN — STANDARDIZED SENNA CONCENTRATE AND DOCUSATE SODIUM 1 TABLET: 8.6; 5 TABLET, FILM COATED ORAL at 08:33

## 2018-04-22 RX ADMIN — DOCUSATE SODIUM 100 MG: 50 LIQUID ORAL at 08:33

## 2018-04-22 RX ADMIN — POTASSIUM & SODIUM PHOSPHATES POWDER PACK 280-160-250 MG 250 MG: 280-160-250 PACK at 19:59

## 2018-04-22 RX ADMIN — ENOXAPARIN SODIUM 30 MG: 30 INJECTION SUBCUTANEOUS at 08:46

## 2018-04-22 RX ADMIN — IPRATROPIUM BROMIDE AND ALBUTEROL SULFATE 1 AMPULE: 2.5; .5 SOLUTION RESPIRATORY (INHALATION) at 08:35

## 2018-04-22 RX ADMIN — OXYCODONE HYDROCHLORIDE 15 MG: 5 SOLUTION ORAL at 00:14

## 2018-04-22 RX ADMIN — ACETAMINOPHEN 650 MG: 650 SOLUTION ORAL at 12:14

## 2018-04-22 RX ADMIN — CHLORHEXIDINE GLUCONATE 15 ML: 1.2 RINSE ORAL at 20:01

## 2018-04-22 RX ADMIN — POTASSIUM & SODIUM PHOSPHATES POWDER PACK 280-160-250 MG 250 MG: 280-160-250 PACK at 08:35

## 2018-04-22 RX ADMIN — IPRATROPIUM BROMIDE AND ALBUTEROL SULFATE 1 AMPULE: 2.5; .5 SOLUTION RESPIRATORY (INHALATION) at 11:59

## 2018-04-22 RX ADMIN — RISPERIDONE 1 MG: 1 SOLUTION ORAL at 08:34

## 2018-04-22 RX ADMIN — NYSTATIN 500000 UNITS: 100000 SUSPENSION ORAL at 20:01

## 2018-04-22 RX ADMIN — OXYCODONE HYDROCHLORIDE 10 MG: 5 SOLUTION ORAL at 16:05

## 2018-04-22 RX ADMIN — VERAPAMIL HYDROCHLORIDE 80 MG: 80 TABLET, FILM COATED ORAL at 16:06

## 2018-04-22 RX ADMIN — METHOCARBAMOL 1000 MG: 500 TABLET ORAL at 16:06

## 2018-04-22 RX ADMIN — DOCUSATE SODIUM 100 MG: 50 LIQUID ORAL at 19:58

## 2018-04-22 RX ADMIN — RISPERIDONE 1 MG: 1 SOLUTION ORAL at 20:00

## 2018-04-22 RX ADMIN — POTASSIUM & SODIUM PHOSPHATES POWDER PACK 280-160-250 MG 250 MG: 280-160-250 PACK at 16:04

## 2018-04-22 RX ADMIN — ACETAMINOPHEN 650 MG: 650 SOLUTION ORAL at 19:57

## 2018-04-22 RX ADMIN — MULTIVIT AND MINERALS-FERROUS GLUCONATE 9 MG IRON/15 ML ORAL LIQUID 15 ML: at 11:05

## 2018-04-22 RX ADMIN — CARVEDILOL 25 MG: 25 TABLET, FILM COATED ORAL at 16:05

## 2018-04-22 RX ADMIN — PRAVASTATIN SODIUM 20 MG: 20 TABLET ORAL at 19:59

## 2018-04-22 RX ADMIN — NYSTATIN 500000 UNITS: 100000 SUSPENSION ORAL at 13:05

## 2018-04-22 RX ADMIN — METHOCARBAMOL 1000 MG: 500 TABLET ORAL at 20:00

## 2018-04-22 RX ADMIN — ENOXAPARIN SODIUM 30 MG: 30 INJECTION SUBCUTANEOUS at 20:00

## 2018-04-22 RX ADMIN — VERAPAMIL HYDROCHLORIDE 80 MG: 80 TABLET, FILM COATED ORAL at 00:13

## 2018-04-22 RX ADMIN — METHOCARBAMOL 1000 MG: 500 TABLET ORAL at 08:32

## 2018-04-22 ASSESSMENT — PAIN DESCRIPTION - LOCATION
LOCATION: GENERALIZED
LOCATION: GENERALIZED

## 2018-04-22 ASSESSMENT — PAIN SCALES - GENERAL
PAINLEVEL_OUTOF10: 10
PAINLEVEL_OUTOF10: 7
PAINLEVEL_OUTOF10: 3
PAINLEVEL_OUTOF10: 3
PAINLEVEL_OUTOF10: 6
PAINLEVEL_OUTOF10: 6

## 2018-04-22 ASSESSMENT — PAIN DESCRIPTION - DESCRIPTORS
DESCRIPTORS: PATIENT UNABLE TO DESCRIBE
DESCRIPTORS: PATIENT UNABLE TO DESCRIBE

## 2018-04-22 ASSESSMENT — PAIN DESCRIPTION - FREQUENCY: FREQUENCY: CONTINUOUS

## 2018-04-22 ASSESSMENT — PAIN DESCRIPTION - PAIN TYPE
TYPE: SURGICAL PAIN
TYPE: SURGICAL PAIN

## 2018-04-22 ASSESSMENT — PAIN DESCRIPTION - ONSET: ONSET: ON-GOING

## 2018-04-22 ASSESSMENT — PAIN DESCRIPTION - PROGRESSION: CLINICAL_PROGRESSION: NOT CHANGED

## 2018-04-23 LAB
ALBUMIN SERPL-MCNC: 3 G/DL (ref 3.5–5.2)
ALP BLD-CCNC: 216 U/L (ref 40–129)
ALT SERPL-CCNC: 256 U/L (ref 0–40)
ANION GAP SERPL CALCULATED.3IONS-SCNC: 14 MMOL/L (ref 7–16)
AST SERPL-CCNC: 205 U/L (ref 0–39)
BASOPHILS ABSOLUTE: 0.03 E9/L (ref 0–0.2)
BASOPHILS RELATIVE PERCENT: 0.3 % (ref 0–2)
BILIRUB SERPL-MCNC: 0.7 MG/DL (ref 0–1.2)
BUN BLDV-MCNC: 19 MG/DL (ref 6–20)
CALCIUM SERPL-MCNC: 9.2 MG/DL (ref 8.6–10.2)
CHLORIDE BLD-SCNC: 96 MMOL/L (ref 98–107)
CO2: 30 MMOL/L (ref 22–29)
CREAT SERPL-MCNC: 0.6 MG/DL (ref 0.7–1.2)
EKG ATRIAL RATE: 74 BPM
EKG P AXIS: 63 DEGREES
EKG P-R INTERVAL: 164 MS
EKG Q-T INTERVAL: 366 MS
EKG QRS DURATION: 94 MS
EKG QTC CALCULATION (BAZETT): 406 MS
EKG R AXIS: 58 DEGREES
EKG T AXIS: 25 DEGREES
EKG VENTRICULAR RATE: 74 BPM
EOSINOPHILS ABSOLUTE: 0.08 E9/L (ref 0.05–0.5)
EOSINOPHILS RELATIVE PERCENT: 0.9 % (ref 0–6)
GFR AFRICAN AMERICAN: >60
GFR NON-AFRICAN AMERICAN: >60 ML/MIN/1.73
GLUCOSE BLD-MCNC: 125 MG/DL (ref 74–109)
HCT VFR BLD CALC: 27.2 % (ref 37–54)
HEMOGLOBIN: 8.5 G/DL (ref 12.5–16.5)
IMMATURE GRANULOCYTES #: 0.07 E9/L
IMMATURE GRANULOCYTES %: 0.8 % (ref 0–5)
LYMPHOCYTES ABSOLUTE: 1.12 E9/L (ref 1.5–4)
LYMPHOCYTES RELATIVE PERCENT: 12.8 % (ref 20–42)
MCH RBC QN AUTO: 29.5 PG (ref 26–35)
MCHC RBC AUTO-ENTMCNC: 31.3 % (ref 32–34.5)
MCV RBC AUTO: 94.4 FL (ref 80–99.9)
METER GLUCOSE: 121 MG/DL (ref 70–110)
MONOCYTES ABSOLUTE: 0.65 E9/L (ref 0.1–0.95)
MONOCYTES RELATIVE PERCENT: 7.5 % (ref 2–12)
NEUTROPHILS ABSOLUTE: 6.77 E9/L (ref 1.8–7.3)
NEUTROPHILS RELATIVE PERCENT: 77.7 % (ref 43–80)
PDW BLD-RTO: 13.4 FL (ref 11.5–15)
PLATELET # BLD: 643 E9/L (ref 130–450)
PMV BLD AUTO: 9.1 FL (ref 7–12)
POTASSIUM REFLEX MAGNESIUM: 4.3 MMOL/L (ref 3.5–5)
RBC # BLD: 2.88 E12/L (ref 3.8–5.8)
SODIUM BLD-SCNC: 140 MMOL/L (ref 132–146)
TOTAL PROTEIN: 6.8 G/DL (ref 6.4–8.3)
WBC # BLD: 8.7 E9/L (ref 4.5–11.5)

## 2018-04-23 PROCEDURE — 94640 AIRWAY INHALATION TREATMENT: CPT

## 2018-04-23 PROCEDURE — 99233 SBSQ HOSP IP/OBS HIGH 50: CPT | Performed by: SURGERY

## 2018-04-23 PROCEDURE — 97530 THERAPEUTIC ACTIVITIES: CPT

## 2018-04-23 PROCEDURE — 6370000000 HC RX 637 (ALT 250 FOR IP): Performed by: STUDENT IN AN ORGANIZED HEALTH CARE EDUCATION/TRAINING PROGRAM

## 2018-04-23 PROCEDURE — 6370000000 HC RX 637 (ALT 250 FOR IP): Performed by: SURGERY

## 2018-04-23 PROCEDURE — 2580000003 HC RX 258: Performed by: STUDENT IN AN ORGANIZED HEALTH CARE EDUCATION/TRAINING PROGRAM

## 2018-04-23 PROCEDURE — 1200000000 HC SEMI PRIVATE

## 2018-04-23 PROCEDURE — 85025 COMPLETE CBC W/AUTO DIFF WBC: CPT

## 2018-04-23 PROCEDURE — 93010 ELECTROCARDIOGRAM REPORT: CPT | Performed by: INTERNAL MEDICINE

## 2018-04-23 PROCEDURE — 6370000000 HC RX 637 (ALT 250 FOR IP): Performed by: NURSE PRACTITIONER

## 2018-04-23 PROCEDURE — 2700000000 HC OXYGEN THERAPY PER DAY

## 2018-04-23 PROCEDURE — APPSS30 APP SPLIT SHARED TIME 16-30 MINUTES: Performed by: NURSE PRACTITIONER

## 2018-04-23 PROCEDURE — 82962 GLUCOSE BLOOD TEST: CPT

## 2018-04-23 PROCEDURE — 80053 COMPREHEN METABOLIC PANEL: CPT

## 2018-04-23 PROCEDURE — 36415 COLL VENOUS BLD VENIPUNCTURE: CPT

## 2018-04-23 PROCEDURE — 31502 CHANGE OF WINDPIPE AIRWAY: CPT

## 2018-04-23 PROCEDURE — 6360000002 HC RX W HCPCS: Performed by: STUDENT IN AN ORGANIZED HEALTH CARE EDUCATION/TRAINING PROGRAM

## 2018-04-23 RX ADMIN — IPRATROPIUM BROMIDE AND ALBUTEROL SULFATE 1 AMPULE: 2.5; .5 SOLUTION RESPIRATORY (INHALATION) at 19:22

## 2018-04-23 RX ADMIN — AMOXICILLIN AND CLAVULANATE POTASSIUM 1 TABLET: 875; 125 TABLET, FILM COATED ORAL at 10:00

## 2018-04-23 RX ADMIN — IPRATROPIUM BROMIDE AND ALBUTEROL SULFATE 1 AMPULE: 2.5; .5 SOLUTION RESPIRATORY (INHALATION) at 22:59

## 2018-04-23 RX ADMIN — VERAPAMIL HYDROCHLORIDE 80 MG: 80 TABLET, FILM COATED ORAL at 18:06

## 2018-04-23 RX ADMIN — Medication 10 ML: at 22:20

## 2018-04-23 RX ADMIN — IPRATROPIUM BROMIDE AND ALBUTEROL SULFATE 1 AMPULE: 2.5; .5 SOLUTION RESPIRATORY (INHALATION) at 06:17

## 2018-04-23 RX ADMIN — PANTOPRAZOLE SODIUM 40 MG: 40 GRANULE, DELAYED RELEASE ORAL at 05:43

## 2018-04-23 RX ADMIN — VERAPAMIL HYDROCHLORIDE 80 MG: 80 TABLET, FILM COATED ORAL at 10:02

## 2018-04-23 RX ADMIN — POTASSIUM & SODIUM PHOSPHATES POWDER PACK 280-160-250 MG 250 MG: 280-160-250 PACK at 21:49

## 2018-04-23 RX ADMIN — NYSTATIN 500000 UNITS: 100000 SUSPENSION ORAL at 14:45

## 2018-04-23 RX ADMIN — ACETAMINOPHEN 650 MG: 650 SOLUTION ORAL at 09:57

## 2018-04-23 RX ADMIN — CHLORHEXIDINE GLUCONATE 15 ML: 1.2 RINSE ORAL at 09:57

## 2018-04-23 RX ADMIN — STANDARDIZED SENNA CONCENTRATE AND DOCUSATE SODIUM 1 TABLET: 8.6; 5 TABLET, FILM COATED ORAL at 10:02

## 2018-04-23 RX ADMIN — MULTIVIT AND MINERALS-FERROUS GLUCONATE 9 MG IRON/15 ML ORAL LIQUID 15 ML: at 14:44

## 2018-04-23 RX ADMIN — POTASSIUM & SODIUM PHOSPHATES POWDER PACK 280-160-250 MG 250 MG: 280-160-250 PACK at 14:46

## 2018-04-23 RX ADMIN — OXYCODONE HYDROCHLORIDE 15 MG: 5 SOLUTION ORAL at 22:17

## 2018-04-23 RX ADMIN — Medication 10 ML: at 10:00

## 2018-04-23 RX ADMIN — POLYETHYLENE GLYCOL 3350 17 G: 17 POWDER, FOR SOLUTION ORAL at 10:02

## 2018-04-23 RX ADMIN — NYSTATIN 500000 UNITS: 100000 SUSPENSION ORAL at 09:57

## 2018-04-23 RX ADMIN — OXYCODONE HYDROCHLORIDE 10 MG: 5 SOLUTION ORAL at 18:04

## 2018-04-23 RX ADMIN — METHOCARBAMOL 1000 MG: 500 TABLET ORAL at 21:52

## 2018-04-23 RX ADMIN — VERAPAMIL HYDROCHLORIDE 80 MG: 80 TABLET, FILM COATED ORAL at 00:08

## 2018-04-23 RX ADMIN — IPRATROPIUM BROMIDE AND ALBUTEROL SULFATE 1 AMPULE: 2.5; .5 SOLUTION RESPIRATORY (INHALATION) at 12:34

## 2018-04-23 RX ADMIN — RISPERIDONE 1 MG: 1 SOLUTION ORAL at 09:59

## 2018-04-23 RX ADMIN — RISPERIDONE 1 MG: 1 SOLUTION ORAL at 21:49

## 2018-04-23 RX ADMIN — ENOXAPARIN SODIUM 30 MG: 30 INJECTION SUBCUTANEOUS at 21:49

## 2018-04-23 RX ADMIN — OXYCODONE HYDROCHLORIDE 15 MG: 5 SOLUTION ORAL at 02:17

## 2018-04-23 RX ADMIN — METHOCARBAMOL 1000 MG: 500 TABLET ORAL at 14:44

## 2018-04-23 RX ADMIN — NYSTATIN 500000 UNITS: 100000 SUSPENSION ORAL at 19:39

## 2018-04-23 RX ADMIN — OXYCODONE HYDROCHLORIDE 10 MG: 5 SOLUTION ORAL at 05:43

## 2018-04-23 RX ADMIN — PREGABALIN 100 MG: 100 CAPSULE ORAL at 21:51

## 2018-04-23 RX ADMIN — ENOXAPARIN SODIUM 30 MG: 30 INJECTION SUBCUTANEOUS at 09:56

## 2018-04-23 RX ADMIN — AMOXICILLIN AND CLAVULANATE POTASSIUM 1 TABLET: 875; 125 TABLET, FILM COATED ORAL at 21:52

## 2018-04-23 RX ADMIN — PRAVASTATIN SODIUM 20 MG: 20 TABLET ORAL at 21:52

## 2018-04-23 RX ADMIN — PREGABALIN 100 MG: 100 CAPSULE ORAL at 09:59

## 2018-04-23 RX ADMIN — DOCUSATE SODIUM 100 MG: 50 LIQUID ORAL at 21:53

## 2018-04-23 RX ADMIN — POTASSIUM & SODIUM PHOSPHATES POWDER PACK 280-160-250 MG 250 MG: 280-160-250 PACK at 09:58

## 2018-04-23 RX ADMIN — ACETAMINOPHEN 650 MG: 650 SOLUTION ORAL at 00:07

## 2018-04-23 RX ADMIN — OXYCODONE HYDROCHLORIDE 15 MG: 5 SOLUTION ORAL at 09:56

## 2018-04-23 RX ADMIN — OXYCODONE HYDROCHLORIDE 10 MG: 5 SOLUTION ORAL at 00:08

## 2018-04-23 RX ADMIN — DOCUSATE SODIUM 100 MG: 50 LIQUID ORAL at 09:58

## 2018-04-23 RX ADMIN — METHOCARBAMOL 1000 MG: 500 TABLET ORAL at 18:05

## 2018-04-23 RX ADMIN — POTASSIUM & SODIUM PHOSPHATES POWDER PACK 280-160-250 MG 250 MG: 280-160-250 PACK at 18:06

## 2018-04-23 RX ADMIN — OXYCODONE HYDROCHLORIDE 15 MG: 5 SOLUTION ORAL at 14:43

## 2018-04-23 RX ADMIN — METHOCARBAMOL 1000 MG: 500 TABLET ORAL at 09:57

## 2018-04-23 ASSESSMENT — PAIN DESCRIPTION - DESCRIPTORS
DESCRIPTORS: ACHING;DISCOMFORT;DULL
DESCRIPTORS: ACHING;CONSTANT;DISCOMFORT
DESCRIPTORS: ACHING;CONSTANT;DISCOMFORT
DESCRIPTORS: ACHING;CONSTANT;DISCOMFORT;SORE
DESCRIPTORS: ACHING;DISCOMFORT;DULL

## 2018-04-23 ASSESSMENT — PAIN SCALES - GENERAL
PAINLEVEL_OUTOF10: 0
PAINLEVEL_OUTOF10: 10
PAINLEVEL_OUTOF10: 9
PAINLEVEL_OUTOF10: 0
PAINLEVEL_OUTOF10: 9
PAINLEVEL_OUTOF10: 0
PAINLEVEL_OUTOF10: 0
PAINLEVEL_OUTOF10: 5
PAINLEVEL_OUTOF10: 6
PAINLEVEL_OUTOF10: 6
PAINLEVEL_OUTOF10: 8
PAINLEVEL_OUTOF10: 6
PAINLEVEL_OUTOF10: 6
PAINLEVEL_OUTOF10: 7

## 2018-04-23 ASSESSMENT — PAIN DESCRIPTION - PROGRESSION
CLINICAL_PROGRESSION: GRADUALLY WORSENING
CLINICAL_PROGRESSION: GRADUALLY IMPROVING

## 2018-04-23 ASSESSMENT — PAIN DESCRIPTION - ORIENTATION
ORIENTATION: RIGHT;LEFT

## 2018-04-23 ASSESSMENT — PAIN DESCRIPTION - FREQUENCY
FREQUENCY: CONTINUOUS

## 2018-04-23 ASSESSMENT — PAIN DESCRIPTION - PAIN TYPE
TYPE: SURGICAL PAIN

## 2018-04-23 ASSESSMENT — PAIN DESCRIPTION - ONSET
ONSET: ON-GOING
ONSET: ON-GOING

## 2018-04-23 ASSESSMENT — PAIN DESCRIPTION - LOCATION
LOCATION: ARM;LEG
LOCATION: ARM;FOOT;LEG
LOCATION: ARM;FOOT;LEG
LOCATION: ARM;LEG

## 2018-04-24 ENCOUNTER — APPOINTMENT (OUTPATIENT)
Dept: GENERAL RADIOLOGY | Age: 53
DRG: 003 | End: 2018-04-24
Payer: MEDICARE

## 2018-04-24 LAB
ALBUMIN SERPL-MCNC: 3.3 G/DL (ref 3.5–5.2)
ALP BLD-CCNC: 259 U/L (ref 40–129)
ALT SERPL-CCNC: 281 U/L (ref 0–40)
ANION GAP SERPL CALCULATED.3IONS-SCNC: 14 MMOL/L (ref 7–16)
AST SERPL-CCNC: 170 U/L (ref 0–39)
BILIRUB SERPL-MCNC: 0.5 MG/DL (ref 0–1.2)
BUN BLDV-MCNC: 18 MG/DL (ref 6–20)
CALCIUM SERPL-MCNC: 9.1 MG/DL (ref 8.6–10.2)
CHLORIDE BLD-SCNC: 98 MMOL/L (ref 98–107)
CO2: 30 MMOL/L (ref 22–29)
CREAT SERPL-MCNC: 0.6 MG/DL (ref 0.7–1.2)
GFR AFRICAN AMERICAN: >60
GFR NON-AFRICAN AMERICAN: >60 ML/MIN/1.73
GLUCOSE BLD-MCNC: 123 MG/DL (ref 74–109)
MAGNESIUM: 2.3 MG/DL (ref 1.6–2.6)
POTASSIUM REFLEX MAGNESIUM: 3.5 MMOL/L (ref 3.5–5)
SODIUM BLD-SCNC: 142 MMOL/L (ref 132–146)
TOTAL PROTEIN: 7.1 G/DL (ref 6.4–8.3)

## 2018-04-24 PROCEDURE — 6370000000 HC RX 637 (ALT 250 FOR IP): Performed by: STUDENT IN AN ORGANIZED HEALTH CARE EDUCATION/TRAINING PROGRAM

## 2018-04-24 PROCEDURE — 6360000002 HC RX W HCPCS: Performed by: STUDENT IN AN ORGANIZED HEALTH CARE EDUCATION/TRAINING PROGRAM

## 2018-04-24 PROCEDURE — 83735 ASSAY OF MAGNESIUM: CPT

## 2018-04-24 PROCEDURE — 94640 AIRWAY INHALATION TREATMENT: CPT

## 2018-04-24 PROCEDURE — 6370000000 HC RX 637 (ALT 250 FOR IP): Performed by: SURGERY

## 2018-04-24 PROCEDURE — 73650 X-RAY EXAM OF HEEL: CPT

## 2018-04-24 PROCEDURE — 1200000000 HC SEMI PRIVATE

## 2018-04-24 PROCEDURE — 2700000000 HC OXYGEN THERAPY PER DAY

## 2018-04-24 PROCEDURE — 80053 COMPREHEN METABOLIC PANEL: CPT

## 2018-04-24 PROCEDURE — 99233 SBSQ HOSP IP/OBS HIGH 50: CPT | Performed by: SURGERY

## 2018-04-24 PROCEDURE — 36415 COLL VENOUS BLD VENIPUNCTURE: CPT

## 2018-04-24 PROCEDURE — 6370000000 HC RX 637 (ALT 250 FOR IP): Performed by: NURSE PRACTITIONER

## 2018-04-24 PROCEDURE — APPSS30 APP SPLIT SHARED TIME 16-30 MINUTES: Performed by: NURSE PRACTITIONER

## 2018-04-24 RX ORDER — SODIUM CHLORIDE 0.9 % (FLUSH) 0.9 %
10 SYRINGE (ML) INJECTION EVERY 12 HOURS SCHEDULED
Status: DISCONTINUED | OUTPATIENT
Start: 2018-04-24 | End: 2018-04-24 | Stop reason: SDUPTHER

## 2018-04-24 RX ORDER — LANOLIN ALCOHOL/MO/W.PET/CERES
3 CREAM (GRAM) TOPICAL NIGHTLY
Status: DISCONTINUED | OUTPATIENT
Start: 2018-04-24 | End: 2018-04-27 | Stop reason: HOSPADM

## 2018-04-24 RX ORDER — ONDANSETRON 2 MG/ML
4 INJECTION INTRAMUSCULAR; INTRAVENOUS EVERY 6 HOURS PRN
Status: DISCONTINUED | OUTPATIENT
Start: 2018-04-24 | End: 2018-04-27 | Stop reason: HOSPADM

## 2018-04-24 RX ORDER — DOCUSATE SODIUM 100 MG/1
100 CAPSULE, LIQUID FILLED ORAL 2 TIMES DAILY
Status: DISCONTINUED | OUTPATIENT
Start: 2018-04-24 | End: 2018-04-24 | Stop reason: SDUPTHER

## 2018-04-24 RX ORDER — SODIUM CHLORIDE 0.9 % (FLUSH) 0.9 %
10 SYRINGE (ML) INJECTION PRN
Status: DISCONTINUED | OUTPATIENT
Start: 2018-04-24 | End: 2018-04-27 | Stop reason: HOSPADM

## 2018-04-24 RX ORDER — RISPERIDONE 1 MG/ML
2 SOLUTION ORAL 2 TIMES DAILY
Status: DISCONTINUED | OUTPATIENT
Start: 2018-04-24 | End: 2018-04-26

## 2018-04-24 RX ADMIN — POLYETHYLENE GLYCOL 3350 17 G: 17 POWDER, FOR SOLUTION ORAL at 09:00

## 2018-04-24 RX ADMIN — NYSTATIN 500000 UNITS: 100000 SUSPENSION ORAL at 16:16

## 2018-04-24 RX ADMIN — STANDARDIZED SENNA CONCENTRATE AND DOCUSATE SODIUM 1 TABLET: 8.6; 5 TABLET, FILM COATED ORAL at 09:38

## 2018-04-24 RX ADMIN — IPRATROPIUM BROMIDE AND ALBUTEROL SULFATE 1 AMPULE: 2.5; .5 SOLUTION RESPIRATORY (INHALATION) at 04:02

## 2018-04-24 RX ADMIN — OXYCODONE HYDROCHLORIDE 10 MG: 5 SOLUTION ORAL at 19:54

## 2018-04-24 RX ADMIN — METHOCARBAMOL 1000 MG: 500 TABLET ORAL at 16:18

## 2018-04-24 RX ADMIN — RISPERIDONE 2 MG: 1 SOLUTION ORAL at 22:18

## 2018-04-24 RX ADMIN — OXYCODONE HYDROCHLORIDE 15 MG: 5 SOLUTION ORAL at 22:18

## 2018-04-24 RX ADMIN — Medication 3 MG: at 22:20

## 2018-04-24 RX ADMIN — RISPERIDONE 2 MG: 1 SOLUTION ORAL at 09:41

## 2018-04-24 RX ADMIN — PREGABALIN 100 MG: 100 CAPSULE ORAL at 09:38

## 2018-04-24 RX ADMIN — METHOCARBAMOL 1000 MG: 500 TABLET ORAL at 09:39

## 2018-04-24 RX ADMIN — AMOXICILLIN AND CLAVULANATE POTASSIUM 1 TABLET: 875; 125 TABLET, FILM COATED ORAL at 22:20

## 2018-04-24 RX ADMIN — ENOXAPARIN SODIUM 30 MG: 30 INJECTION SUBCUTANEOUS at 22:18

## 2018-04-24 RX ADMIN — POTASSIUM & SODIUM PHOSPHATES POWDER PACK 280-160-250 MG 250 MG: 280-160-250 PACK at 09:41

## 2018-04-24 RX ADMIN — OXYCODONE HYDROCHLORIDE 10 MG: 5 SOLUTION ORAL at 13:27

## 2018-04-24 RX ADMIN — CARVEDILOL 25 MG: 25 TABLET, FILM COATED ORAL at 16:16

## 2018-04-24 RX ADMIN — IPRATROPIUM BROMIDE AND ALBUTEROL SULFATE 1 AMPULE: 2.5; .5 SOLUTION RESPIRATORY (INHALATION) at 22:03

## 2018-04-24 RX ADMIN — POTASSIUM & SODIUM PHOSPHATES POWDER PACK 280-160-250 MG 250 MG: 280-160-250 PACK at 16:18

## 2018-04-24 RX ADMIN — PANTOPRAZOLE SODIUM 40 MG: 40 GRANULE, DELAYED RELEASE ORAL at 06:46

## 2018-04-24 RX ADMIN — POTASSIUM & SODIUM PHOSPHATES POWDER PACK 280-160-250 MG 250 MG: 280-160-250 PACK at 13:26

## 2018-04-24 RX ADMIN — IPRATROPIUM BROMIDE AND ALBUTEROL SULFATE 1 AMPULE: 2.5; .5 SOLUTION RESPIRATORY (INHALATION) at 17:52

## 2018-04-24 RX ADMIN — AMOXICILLIN AND CLAVULANATE POTASSIUM 1 TABLET: 875; 125 TABLET, FILM COATED ORAL at 09:39

## 2018-04-24 RX ADMIN — NYSTATIN 500000 UNITS: 100000 SUSPENSION ORAL at 09:38

## 2018-04-24 RX ADMIN — PREGABALIN 100 MG: 100 CAPSULE ORAL at 22:19

## 2018-04-24 RX ADMIN — NYSTATIN 500000 UNITS: 100000 SUSPENSION ORAL at 13:28

## 2018-04-24 RX ADMIN — VERAPAMIL HYDROCHLORIDE 80 MG: 80 TABLET, FILM COATED ORAL at 16:17

## 2018-04-24 RX ADMIN — METHOCARBAMOL 1000 MG: 500 TABLET ORAL at 13:24

## 2018-04-24 RX ADMIN — ENOXAPARIN SODIUM 30 MG: 30 INJECTION SUBCUTANEOUS at 09:38

## 2018-04-24 RX ADMIN — METHOCARBAMOL 1000 MG: 500 TABLET ORAL at 22:19

## 2018-04-24 RX ADMIN — OXYCODONE HYDROCHLORIDE 15 MG: 5 SOLUTION ORAL at 16:16

## 2018-04-24 RX ADMIN — IPRATROPIUM BROMIDE AND ALBUTEROL SULFATE 1 AMPULE: 2.5; .5 SOLUTION RESPIRATORY (INHALATION) at 10:45

## 2018-04-24 RX ADMIN — OXYCODONE HYDROCHLORIDE 15 MG: 5 SOLUTION ORAL at 04:56

## 2018-04-24 RX ADMIN — POTASSIUM & SODIUM PHOSPHATES POWDER PACK 280-160-250 MG 250 MG: 280-160-250 PACK at 22:20

## 2018-04-24 RX ADMIN — OXYCODONE HYDROCHLORIDE 10 MG: 5 SOLUTION ORAL at 01:00

## 2018-04-24 RX ADMIN — OXYCODONE HYDROCHLORIDE 15 MG: 5 SOLUTION ORAL at 10:01

## 2018-04-24 RX ADMIN — CEFAZOLIN SODIUM 2 G: 2 SOLUTION INTRAVENOUS at 10:53

## 2018-04-24 RX ADMIN — DOCUSATE SODIUM 100 MG: 50 LIQUID ORAL at 09:38

## 2018-04-24 RX ADMIN — MULTIVIT AND MINERALS-FERROUS GLUCONATE 9 MG IRON/15 ML ORAL LIQUID 15 ML: at 09:38

## 2018-04-24 RX ADMIN — VERAPAMIL HYDROCHLORIDE 80 MG: 80 TABLET, FILM COATED ORAL at 02:19

## 2018-04-24 RX ADMIN — VERAPAMIL HYDROCHLORIDE 80 MG: 80 TABLET, FILM COATED ORAL at 09:38

## 2018-04-24 RX ADMIN — PRAVASTATIN SODIUM 20 MG: 20 TABLET ORAL at 22:20

## 2018-04-24 ASSESSMENT — PAIN DESCRIPTION - PAIN TYPE
TYPE: SURGICAL PAIN

## 2018-04-24 ASSESSMENT — PAIN SCALES - GENERAL
PAINLEVEL_OUTOF10: 0
PAINLEVEL_OUTOF10: 6
PAINLEVEL_OUTOF10: 0
PAINLEVEL_OUTOF10: 8
PAINLEVEL_OUTOF10: 7
PAINLEVEL_OUTOF10: 7
PAINLEVEL_OUTOF10: 9
PAINLEVEL_OUTOF10: 0
PAINLEVEL_OUTOF10: 8
PAINLEVEL_OUTOF10: 9

## 2018-04-24 ASSESSMENT — PAIN DESCRIPTION - ONSET
ONSET: ON-GOING

## 2018-04-24 ASSESSMENT — PAIN DESCRIPTION - ORIENTATION
ORIENTATION: LEFT
ORIENTATION: RIGHT;LEFT
ORIENTATION: LEFT

## 2018-04-24 ASSESSMENT — PAIN DESCRIPTION - DESCRIPTORS
DESCRIPTORS: ACHING;DISCOMFORT
DESCRIPTORS: ACHING;DISCOMFORT;SORE
DESCRIPTORS: ACHING;DISCOMFORT
DESCRIPTORS: ACHING;DISCOMFORT
DESCRIPTORS: ACHING

## 2018-04-24 ASSESSMENT — PAIN DESCRIPTION - LOCATION
LOCATION: ARM
LOCATION: ARM
LOCATION: ARM;LEG
LOCATION: ARM
LOCATION: ARM

## 2018-04-24 ASSESSMENT — PAIN DESCRIPTION - FREQUENCY: FREQUENCY: CONTINUOUS

## 2018-04-24 ASSESSMENT — PAIN SCALES - WONG BAKER: WONGBAKER_NUMERICALRESPONSE: 0

## 2018-04-25 ENCOUNTER — APPOINTMENT (OUTPATIENT)
Dept: GENERAL RADIOLOGY | Age: 53
DRG: 003 | End: 2018-04-25
Payer: MEDICARE

## 2018-04-25 LAB
ALBUMIN SERPL-MCNC: 2.9 G/DL (ref 3.5–5.2)
ALP BLD-CCNC: 238 U/L (ref 40–129)
ALT SERPL-CCNC: 268 U/L (ref 0–40)
ANION GAP SERPL CALCULATED.3IONS-SCNC: 15 MMOL/L (ref 7–16)
AST SERPL-CCNC: 118 U/L (ref 0–39)
BASOPHILS ABSOLUTE: 0.03 E9/L (ref 0–0.2)
BASOPHILS RELATIVE PERCENT: 0.4 % (ref 0–2)
BILIRUB SERPL-MCNC: 0.5 MG/DL (ref 0–1.2)
BUN BLDV-MCNC: 16 MG/DL (ref 6–20)
CALCIUM SERPL-MCNC: 9.3 MG/DL (ref 8.6–10.2)
CHLORIDE BLD-SCNC: 98 MMOL/L (ref 98–107)
CO2: 26 MMOL/L (ref 22–29)
CREAT SERPL-MCNC: 0.6 MG/DL (ref 0.7–1.2)
EOSINOPHILS ABSOLUTE: 0.11 E9/L (ref 0.05–0.5)
EOSINOPHILS RELATIVE PERCENT: 1.4 % (ref 0–6)
GFR AFRICAN AMERICAN: >60
GFR NON-AFRICAN AMERICAN: >60 ML/MIN/1.73
GLUCOSE BLD-MCNC: 125 MG/DL (ref 74–109)
HCT VFR BLD CALC: 27.3 % (ref 37–54)
HEMOGLOBIN: 8.5 G/DL (ref 12.5–16.5)
IMMATURE GRANULOCYTES #: 0.07 E9/L
IMMATURE GRANULOCYTES %: 0.9 % (ref 0–5)
LYMPHOCYTES ABSOLUTE: 1.26 E9/L (ref 1.5–4)
LYMPHOCYTES RELATIVE PERCENT: 15.6 % (ref 20–42)
MCH RBC QN AUTO: 29.7 PG (ref 26–35)
MCHC RBC AUTO-ENTMCNC: 31.1 % (ref 32–34.5)
MCV RBC AUTO: 95.5 FL (ref 80–99.9)
MONOCYTES ABSOLUTE: 0.74 E9/L (ref 0.1–0.95)
MONOCYTES RELATIVE PERCENT: 9.1 % (ref 2–12)
NEUTROPHILS ABSOLUTE: 5.88 E9/L (ref 1.8–7.3)
NEUTROPHILS RELATIVE PERCENT: 72.6 % (ref 43–80)
PDW BLD-RTO: 13.7 FL (ref 11.5–15)
PLATELET # BLD: 626 E9/L (ref 130–450)
PMV BLD AUTO: 9.8 FL (ref 7–12)
POTASSIUM REFLEX MAGNESIUM: 4.7 MMOL/L (ref 3.5–5)
RBC # BLD: 2.86 E12/L (ref 3.8–5.8)
SODIUM BLD-SCNC: 139 MMOL/L (ref 132–146)
TOTAL PROTEIN: 7 G/DL (ref 6.4–8.3)
WBC # BLD: 8.1 E9/L (ref 4.5–11.5)

## 2018-04-25 PROCEDURE — 99233 SBSQ HOSP IP/OBS HIGH 50: CPT | Performed by: SURGERY

## 2018-04-25 PROCEDURE — 6370000000 HC RX 637 (ALT 250 FOR IP): Performed by: SURGERY

## 2018-04-25 PROCEDURE — 36415 COLL VENOUS BLD VENIPUNCTURE: CPT

## 2018-04-25 PROCEDURE — 6370000000 HC RX 637 (ALT 250 FOR IP): Performed by: NURSE PRACTITIONER

## 2018-04-25 PROCEDURE — 6370000000 HC RX 637 (ALT 250 FOR IP): Performed by: STUDENT IN AN ORGANIZED HEALTH CARE EDUCATION/TRAINING PROGRAM

## 2018-04-25 PROCEDURE — 6360000002 HC RX W HCPCS: Performed by: STUDENT IN AN ORGANIZED HEALTH CARE EDUCATION/TRAINING PROGRAM

## 2018-04-25 PROCEDURE — 85025 COMPLETE CBC W/AUTO DIFF WBC: CPT

## 2018-04-25 PROCEDURE — APPSS30 APP SPLIT SHARED TIME 16-30 MINUTES: Performed by: NURSE PRACTITIONER

## 2018-04-25 PROCEDURE — 92611 MOTION FLUOROSCOPY/SWALLOW: CPT

## 2018-04-25 PROCEDURE — 74230 X-RAY XM SWLNG FUNCJ C+: CPT

## 2018-04-25 PROCEDURE — 1200000000 HC SEMI PRIVATE

## 2018-04-25 PROCEDURE — 97530 THERAPEUTIC ACTIVITIES: CPT

## 2018-04-25 PROCEDURE — 2580000003 HC RX 258: Performed by: STUDENT IN AN ORGANIZED HEALTH CARE EDUCATION/TRAINING PROGRAM

## 2018-04-25 PROCEDURE — 94640 AIRWAY INHALATION TREATMENT: CPT

## 2018-04-25 PROCEDURE — 2700000000 HC OXYGEN THERAPY PER DAY

## 2018-04-25 PROCEDURE — 80053 COMPREHEN METABOLIC PANEL: CPT

## 2018-04-25 PROCEDURE — 97535 SELF CARE MNGMENT TRAINING: CPT

## 2018-04-25 RX ADMIN — AMOXICILLIN AND CLAVULANATE POTASSIUM 1 TABLET: 875; 125 TABLET, FILM COATED ORAL at 09:00

## 2018-04-25 RX ADMIN — OXYCODONE HYDROCHLORIDE 10 MG: 5 SOLUTION ORAL at 00:58

## 2018-04-25 RX ADMIN — OXYCODONE HYDROCHLORIDE 15 MG: 5 SOLUTION ORAL at 22:17

## 2018-04-25 RX ADMIN — VERAPAMIL HYDROCHLORIDE 80 MG: 80 TABLET, FILM COATED ORAL at 00:00

## 2018-04-25 RX ADMIN — PANTOPRAZOLE SODIUM 40 MG: 40 GRANULE, DELAYED RELEASE ORAL at 07:21

## 2018-04-25 RX ADMIN — NYSTATIN 500000 UNITS: 100000 SUSPENSION ORAL at 17:28

## 2018-04-25 RX ADMIN — POTASSIUM & SODIUM PHOSPHATES POWDER PACK 280-160-250 MG 250 MG: 280-160-250 PACK at 17:29

## 2018-04-25 RX ADMIN — DOCUSATE SODIUM 100 MG: 50 LIQUID ORAL at 08:59

## 2018-04-25 RX ADMIN — NYSTATIN 500000 UNITS: 100000 SUSPENSION ORAL at 13:37

## 2018-04-25 RX ADMIN — VERAPAMIL HYDROCHLORIDE 80 MG: 80 TABLET, FILM COATED ORAL at 16:25

## 2018-04-25 RX ADMIN — METHOCARBAMOL 1000 MG: 500 TABLET ORAL at 13:36

## 2018-04-25 RX ADMIN — OXYCODONE HYDROCHLORIDE 15 MG: 5 SOLUTION ORAL at 17:30

## 2018-04-25 RX ADMIN — VERAPAMIL HYDROCHLORIDE 80 MG: 80 TABLET, FILM COATED ORAL at 08:59

## 2018-04-25 RX ADMIN — OXYCODONE HYDROCHLORIDE 15 MG: 5 SOLUTION ORAL at 09:32

## 2018-04-25 RX ADMIN — NYSTATIN 500000 UNITS: 100000 SUSPENSION ORAL at 08:59

## 2018-04-25 RX ADMIN — IPRATROPIUM BROMIDE AND ALBUTEROL SULFATE 1 AMPULE: 2.5; .5 SOLUTION RESPIRATORY (INHALATION) at 11:27

## 2018-04-25 RX ADMIN — PRAVASTATIN SODIUM 20 MG: 20 TABLET ORAL at 20:52

## 2018-04-25 RX ADMIN — POTASSIUM & SODIUM PHOSPHATES POWDER PACK 280-160-250 MG 250 MG: 280-160-250 PACK at 09:00

## 2018-04-25 RX ADMIN — PREGABALIN 100 MG: 100 CAPSULE ORAL at 20:51

## 2018-04-25 RX ADMIN — POTASSIUM & SODIUM PHOSPHATES POWDER PACK 280-160-250 MG 250 MG: 280-160-250 PACK at 20:51

## 2018-04-25 RX ADMIN — CARVEDILOL 25 MG: 25 TABLET, FILM COATED ORAL at 09:00

## 2018-04-25 RX ADMIN — LOSARTAN POTASSIUM 100 MG: 50 TABLET, FILM COATED ORAL at 09:00

## 2018-04-25 RX ADMIN — METHOCARBAMOL 1000 MG: 500 TABLET ORAL at 09:00

## 2018-04-25 RX ADMIN — OXYCODONE HYDROCHLORIDE 10 MG: 5 SOLUTION ORAL at 05:37

## 2018-04-25 RX ADMIN — Medication 3 MG: at 20:51

## 2018-04-25 RX ADMIN — IPRATROPIUM BROMIDE AND ALBUTEROL SULFATE 1 AMPULE: 2.5; .5 SOLUTION RESPIRATORY (INHALATION) at 07:48

## 2018-04-25 RX ADMIN — STANDARDIZED SENNA CONCENTRATE AND DOCUSATE SODIUM 1 TABLET: 8.6; 5 TABLET, FILM COATED ORAL at 11:15

## 2018-04-25 RX ADMIN — RISPERIDONE 2 MG: 1 SOLUTION ORAL at 11:10

## 2018-04-25 RX ADMIN — IPRATROPIUM BROMIDE AND ALBUTEROL SULFATE 1 AMPULE: 2.5; .5 SOLUTION RESPIRATORY (INHALATION) at 21:17

## 2018-04-25 RX ADMIN — POTASSIUM & SODIUM PHOSPHATES POWDER PACK 280-160-250 MG 250 MG: 280-160-250 PACK at 13:37

## 2018-04-25 RX ADMIN — PREGABALIN 100 MG: 100 CAPSULE ORAL at 08:59

## 2018-04-25 RX ADMIN — Medication 10 ML: at 20:51

## 2018-04-25 RX ADMIN — ENOXAPARIN SODIUM 30 MG: 30 INJECTION SUBCUTANEOUS at 08:59

## 2018-04-25 RX ADMIN — METHOCARBAMOL 1000 MG: 500 TABLET ORAL at 20:51

## 2018-04-25 RX ADMIN — ENOXAPARIN SODIUM 30 MG: 30 INJECTION SUBCUTANEOUS at 20:50

## 2018-04-25 RX ADMIN — NYSTATIN 500000 UNITS: 100000 SUSPENSION ORAL at 22:18

## 2018-04-25 RX ADMIN — MULTIVIT AND MINERALS-FERROUS GLUCONATE 9 MG IRON/15 ML ORAL LIQUID 15 ML: at 09:00

## 2018-04-25 RX ADMIN — POLYETHYLENE GLYCOL 3350 17 G: 17 POWDER, FOR SOLUTION ORAL at 11:15

## 2018-04-25 RX ADMIN — AMOXICILLIN AND CLAVULANATE POTASSIUM 1 TABLET: 875; 125 TABLET, FILM COATED ORAL at 20:51

## 2018-04-25 RX ADMIN — RISPERIDONE 2 MG: 1 SOLUTION ORAL at 20:50

## 2018-04-25 RX ADMIN — METHOCARBAMOL 1000 MG: 500 TABLET ORAL at 17:29

## 2018-04-25 RX ADMIN — IPRATROPIUM BROMIDE AND ALBUTEROL SULFATE 1 AMPULE: 2.5; .5 SOLUTION RESPIRATORY (INHALATION) at 16:58

## 2018-04-25 ASSESSMENT — PAIN DESCRIPTION - FREQUENCY: FREQUENCY: CONTINUOUS

## 2018-04-25 ASSESSMENT — PAIN SCALES - GENERAL
PAINLEVEL_OUTOF10: 8
PAINLEVEL_OUTOF10: 0
PAINLEVEL_OUTOF10: 9
PAINLEVEL_OUTOF10: 7
PAINLEVEL_OUTOF10: 10
PAINLEVEL_OUTOF10: 9
PAINLEVEL_OUTOF10: 9

## 2018-04-25 ASSESSMENT — PAIN DESCRIPTION - ORIENTATION
ORIENTATION: RIGHT;LEFT
ORIENTATION: RIGHT;LEFT

## 2018-04-25 ASSESSMENT — PAIN DESCRIPTION - PAIN TYPE
TYPE: SURGICAL PAIN
TYPE: SURGICAL PAIN

## 2018-04-25 ASSESSMENT — PAIN SCALES - WONG BAKER: WONGBAKER_NUMERICALRESPONSE: 0

## 2018-04-25 ASSESSMENT — PAIN DESCRIPTION - PROGRESSION: CLINICAL_PROGRESSION: GRADUALLY WORSENING

## 2018-04-25 ASSESSMENT — PAIN DESCRIPTION - DESCRIPTORS: DESCRIPTORS: ACHING;DISCOMFORT;DULL

## 2018-04-25 ASSESSMENT — PAIN DESCRIPTION - ONSET: ONSET: ON-GOING

## 2018-04-25 ASSESSMENT — PAIN DESCRIPTION - LOCATION
LOCATION: LEG
LOCATION: LEG

## 2018-04-26 LAB
ALBUMIN SERPL-MCNC: 3.3 G/DL (ref 3.5–5.2)
ALP BLD-CCNC: 212 U/L (ref 40–129)
ALT SERPL-CCNC: 247 U/L (ref 0–40)
ANION GAP SERPL CALCULATED.3IONS-SCNC: 13 MMOL/L (ref 7–16)
AST SERPL-CCNC: 87 U/L (ref 0–39)
BILIRUB SERPL-MCNC: 0.5 MG/DL (ref 0–1.2)
BUN BLDV-MCNC: 17 MG/DL (ref 6–20)
CALCIUM SERPL-MCNC: 9 MG/DL (ref 8.6–10.2)
CHLORIDE BLD-SCNC: 97 MMOL/L (ref 98–107)
CO2: 29 MMOL/L (ref 22–29)
CREAT SERPL-MCNC: 0.6 MG/DL (ref 0.7–1.2)
GFR AFRICAN AMERICAN: >60
GFR NON-AFRICAN AMERICAN: >60 ML/MIN/1.73
GLUCOSE BLD-MCNC: 122 MG/DL (ref 74–109)
POTASSIUM REFLEX MAGNESIUM: 4.1 MMOL/L (ref 3.5–5)
SODIUM BLD-SCNC: 139 MMOL/L (ref 132–146)
TOTAL PROTEIN: 7 G/DL (ref 6.4–8.3)

## 2018-04-26 PROCEDURE — 6370000000 HC RX 637 (ALT 250 FOR IP): Performed by: NURSE PRACTITIONER

## 2018-04-26 PROCEDURE — APPSS30 APP SPLIT SHARED TIME 16-30 MINUTES: Performed by: NURSE PRACTITIONER

## 2018-04-26 PROCEDURE — 6360000002 HC RX W HCPCS: Performed by: STUDENT IN AN ORGANIZED HEALTH CARE EDUCATION/TRAINING PROGRAM

## 2018-04-26 PROCEDURE — 99233 SBSQ HOSP IP/OBS HIGH 50: CPT | Performed by: SURGERY

## 2018-04-26 PROCEDURE — 97110 THERAPEUTIC EXERCISES: CPT

## 2018-04-26 PROCEDURE — 92526 ORAL FUNCTION THERAPY: CPT

## 2018-04-26 PROCEDURE — 97535 SELF CARE MNGMENT TRAINING: CPT

## 2018-04-26 PROCEDURE — 6370000000 HC RX 637 (ALT 250 FOR IP): Performed by: STUDENT IN AN ORGANIZED HEALTH CARE EDUCATION/TRAINING PROGRAM

## 2018-04-26 PROCEDURE — 94150 VITAL CAPACITY TEST: CPT

## 2018-04-26 PROCEDURE — 36415 COLL VENOUS BLD VENIPUNCTURE: CPT

## 2018-04-26 PROCEDURE — 94640 AIRWAY INHALATION TREATMENT: CPT

## 2018-04-26 PROCEDURE — 6370000000 HC RX 637 (ALT 250 FOR IP): Performed by: SURGERY

## 2018-04-26 PROCEDURE — 2580000003 HC RX 258: Performed by: STUDENT IN AN ORGANIZED HEALTH CARE EDUCATION/TRAINING PROGRAM

## 2018-04-26 PROCEDURE — 97530 THERAPEUTIC ACTIVITIES: CPT

## 2018-04-26 PROCEDURE — 2700000000 HC OXYGEN THERAPY PER DAY

## 2018-04-26 PROCEDURE — 80053 COMPREHEN METABOLIC PANEL: CPT

## 2018-04-26 PROCEDURE — 1200000000 HC SEMI PRIVATE

## 2018-04-26 RX ORDER — GLYCOPYRROLATE 1 MG/1
1 TABLET ORAL 3 TIMES DAILY
Status: DISCONTINUED | OUTPATIENT
Start: 2018-04-26 | End: 2018-04-27 | Stop reason: HOSPADM

## 2018-04-26 RX ORDER — RISPERIDONE 2 MG/1
2 TABLET, FILM COATED ORAL 2 TIMES DAILY
Status: DISCONTINUED | OUTPATIENT
Start: 2018-04-26 | End: 2018-04-27 | Stop reason: HOSPADM

## 2018-04-26 RX ORDER — PANTOPRAZOLE SODIUM 40 MG/1
40 TABLET, DELAYED RELEASE ORAL
Status: DISCONTINUED | OUTPATIENT
Start: 2018-04-26 | End: 2018-04-27 | Stop reason: HOSPADM

## 2018-04-26 RX ORDER — ACETAMINOPHEN 325 MG/1
650 TABLET ORAL EVERY 4 HOURS PRN
Status: DISCONTINUED | OUTPATIENT
Start: 2018-04-26 | End: 2018-04-27 | Stop reason: HOSPADM

## 2018-04-26 RX ORDER — DOCUSATE SODIUM 100 MG/1
100 CAPSULE, LIQUID FILLED ORAL DAILY
Status: DISCONTINUED | OUTPATIENT
Start: 2018-04-26 | End: 2018-04-27 | Stop reason: HOSPADM

## 2018-04-26 RX ORDER — MULTIVITAMIN WITH FOLIC ACID 400 MCG
1 TABLET ORAL DAILY
Status: DISCONTINUED | OUTPATIENT
Start: 2018-04-26 | End: 2018-04-27 | Stop reason: HOSPADM

## 2018-04-26 RX ADMIN — OXYCODONE HYDROCHLORIDE 15 MG: 5 SOLUTION ORAL at 06:31

## 2018-04-26 RX ADMIN — METHOCARBAMOL 1000 MG: 500 TABLET ORAL at 17:01

## 2018-04-26 RX ADMIN — Medication 3 MG: at 20:57

## 2018-04-26 RX ADMIN — METHOCARBAMOL 1000 MG: 500 TABLET ORAL at 20:58

## 2018-04-26 RX ADMIN — Medication 10 ML: at 20:57

## 2018-04-26 RX ADMIN — PANTOPRAZOLE SODIUM 40 MG: 40 GRANULE, DELAYED RELEASE ORAL at 06:31

## 2018-04-26 RX ADMIN — POTASSIUM & SODIUM PHOSPHATES POWDER PACK 280-160-250 MG 250 MG: 280-160-250 PACK at 09:41

## 2018-04-26 RX ADMIN — ENOXAPARIN SODIUM 30 MG: 30 INJECTION SUBCUTANEOUS at 20:56

## 2018-04-26 RX ADMIN — MULTIVITAMIN TABLET 1 TABLET: TABLET at 09:00

## 2018-04-26 RX ADMIN — OXYCODONE HYDROCHLORIDE 15 MG: 5 SOLUTION ORAL at 17:00

## 2018-04-26 RX ADMIN — VERAPAMIL HYDROCHLORIDE 80 MG: 80 TABLET, FILM COATED ORAL at 08:40

## 2018-04-26 RX ADMIN — RISPERIDONE 2 MG: 2 TABLET, FILM COATED ORAL at 09:00

## 2018-04-26 RX ADMIN — OXYCODONE HYDROCHLORIDE 15 MG: 5 SOLUTION ORAL at 23:29

## 2018-04-26 RX ADMIN — OXYCODONE HYDROCHLORIDE 15 MG: 5 SOLUTION ORAL at 13:00

## 2018-04-26 RX ADMIN — GLYCOPYRROLATE 1 MG: 1 TABLET ORAL at 13:52

## 2018-04-26 RX ADMIN — NYSTATIN 500000 UNITS: 100000 SUSPENSION ORAL at 17:00

## 2018-04-26 RX ADMIN — RISPERIDONE 2 MG: 2 TABLET, FILM COATED ORAL at 20:57

## 2018-04-26 RX ADMIN — NYSTATIN 500000 UNITS: 100000 SUSPENSION ORAL at 13:20

## 2018-04-26 RX ADMIN — PREGABALIN 100 MG: 100 CAPSULE ORAL at 20:59

## 2018-04-26 RX ADMIN — NYSTATIN 500000 UNITS: 100000 SUSPENSION ORAL at 09:41

## 2018-04-26 RX ADMIN — OXYCODONE HYDROCHLORIDE 10 MG: 5 SOLUTION ORAL at 09:49

## 2018-04-26 RX ADMIN — IPRATROPIUM BROMIDE AND ALBUTEROL SULFATE 1 AMPULE: 2.5; .5 SOLUTION RESPIRATORY (INHALATION) at 04:22

## 2018-04-26 RX ADMIN — AMOXICILLIN AND CLAVULANATE POTASSIUM 1 TABLET: 875; 125 TABLET, FILM COATED ORAL at 09:29

## 2018-04-26 RX ADMIN — AMOXICILLIN AND CLAVULANATE POTASSIUM 1 TABLET: 875; 125 TABLET, FILM COATED ORAL at 20:57

## 2018-04-26 RX ADMIN — PREGABALIN 100 MG: 100 CAPSULE ORAL at 09:43

## 2018-04-26 RX ADMIN — OXYCODONE HYDROCHLORIDE 10 MG: 5 SOLUTION ORAL at 02:53

## 2018-04-26 RX ADMIN — METHOCARBAMOL 1000 MG: 500 TABLET ORAL at 13:15

## 2018-04-26 RX ADMIN — STANDARDIZED SENNA CONCENTRATE AND DOCUSATE SODIUM 1 TABLET: 8.6; 5 TABLET, FILM COATED ORAL at 09:00

## 2018-04-26 RX ADMIN — IPRATROPIUM BROMIDE AND ALBUTEROL SULFATE 1 AMPULE: 2.5; .5 SOLUTION RESPIRATORY (INHALATION) at 17:16

## 2018-04-26 RX ADMIN — CARVEDILOL 25 MG: 25 TABLET, FILM COATED ORAL at 17:02

## 2018-04-26 RX ADMIN — LOSARTAN POTASSIUM 100 MG: 50 TABLET, FILM COATED ORAL at 09:52

## 2018-04-26 RX ADMIN — OXYCODONE HYDROCHLORIDE 10 MG: 5 SOLUTION ORAL at 20:56

## 2018-04-26 RX ADMIN — ENOXAPARIN SODIUM 30 MG: 30 INJECTION SUBCUTANEOUS at 08:00

## 2018-04-26 RX ADMIN — POTASSIUM & SODIUM PHOSPHATES POWDER PACK 280-160-250 MG 250 MG: 280-160-250 PACK at 17:01

## 2018-04-26 RX ADMIN — DOCUSATE SODIUM 100 MG: 100 CAPSULE, LIQUID FILLED ORAL at 09:38

## 2018-04-26 RX ADMIN — PANTOPRAZOLE SODIUM 40 MG: 40 TABLET, DELAYED RELEASE ORAL at 09:40

## 2018-04-26 RX ADMIN — GLYCOPYRROLATE 1 MG: 1 TABLET ORAL at 09:00

## 2018-04-26 RX ADMIN — VERAPAMIL HYDROCHLORIDE 80 MG: 80 TABLET, FILM COATED ORAL at 23:28

## 2018-04-26 RX ADMIN — VERAPAMIL HYDROCHLORIDE 80 MG: 80 TABLET, FILM COATED ORAL at 17:00

## 2018-04-26 RX ADMIN — METHOCARBAMOL 1000 MG: 500 TABLET ORAL at 09:52

## 2018-04-26 RX ADMIN — POTASSIUM & SODIUM PHOSPHATES POWDER PACK 280-160-250 MG 250 MG: 280-160-250 PACK at 20:57

## 2018-04-26 RX ADMIN — VERAPAMIL HYDROCHLORIDE 80 MG: 80 TABLET, FILM COATED ORAL at 01:50

## 2018-04-26 RX ADMIN — NYSTATIN 500000 UNITS: 100000 SUSPENSION ORAL at 20:58

## 2018-04-26 RX ADMIN — GLYCOPYRROLATE 1 MG: 1 TABLET ORAL at 20:57

## 2018-04-26 RX ADMIN — ACETAMINOPHEN 650 MG: 325 TABLET, FILM COATED ORAL at 06:50

## 2018-04-26 RX ADMIN — POTASSIUM & SODIUM PHOSPHATES POWDER PACK 280-160-250 MG 250 MG: 280-160-250 PACK at 13:15

## 2018-04-26 RX ADMIN — IPRATROPIUM BROMIDE AND ALBUTEROL SULFATE 1 AMPULE: 2.5; .5 SOLUTION RESPIRATORY (INHALATION) at 13:24

## 2018-04-26 RX ADMIN — CARVEDILOL 25 MG: 25 TABLET, FILM COATED ORAL at 08:00

## 2018-04-26 ASSESSMENT — PAIN SCALES - GENERAL
PAINLEVEL_OUTOF10: 7
PAINLEVEL_OUTOF10: 0
PAINLEVEL_OUTOF10: 0
PAINLEVEL_OUTOF10: 8
PAINLEVEL_OUTOF10: 10
PAINLEVEL_OUTOF10: 0
PAINLEVEL_OUTOF10: 6
PAINLEVEL_OUTOF10: 8
PAINLEVEL_OUTOF10: 7
PAINLEVEL_OUTOF10: 5
PAINLEVEL_OUTOF10: 0
PAINLEVEL_OUTOF10: 7
PAINLEVEL_OUTOF10: 7

## 2018-04-26 ASSESSMENT — PAIN DESCRIPTION - ONSET
ONSET: ON-GOING
ONSET: ON-GOING

## 2018-04-26 ASSESSMENT — PAIN DESCRIPTION - LOCATION
LOCATION: LEG;ARM

## 2018-04-26 ASSESSMENT — PAIN DESCRIPTION - DESCRIPTORS
DESCRIPTORS: ACHING;CONSTANT;DISCOMFORT

## 2018-04-26 ASSESSMENT — PAIN DESCRIPTION - PAIN TYPE
TYPE: ACUTE PAIN;SURGICAL PAIN

## 2018-04-26 ASSESSMENT — PAIN DESCRIPTION - FREQUENCY
FREQUENCY: CONTINUOUS

## 2018-04-26 ASSESSMENT — PAIN DESCRIPTION - ORIENTATION
ORIENTATION: RIGHT;LEFT
ORIENTATION: RIGHT;LEFT

## 2018-04-27 VITALS
SYSTOLIC BLOOD PRESSURE: 124 MMHG | DIASTOLIC BLOOD PRESSURE: 58 MMHG | RESPIRATION RATE: 18 BRPM | TEMPERATURE: 99.8 F | HEIGHT: 71 IN | WEIGHT: 171 LBS | OXYGEN SATURATION: 99 % | HEART RATE: 89 BPM | BODY MASS INDEX: 23.94 KG/M2

## 2018-04-27 LAB
BASOPHILS ABSOLUTE: 0.02 E9/L (ref 0–0.2)
BASOPHILS RELATIVE PERCENT: 0.3 % (ref 0–2)
EOSINOPHILS ABSOLUTE: 0.1 E9/L (ref 0.05–0.5)
EOSINOPHILS RELATIVE PERCENT: 1.5 % (ref 0–6)
HCT VFR BLD CALC: 25.9 % (ref 37–54)
HEMOGLOBIN: 8.1 G/DL (ref 12.5–16.5)
IMMATURE GRANULOCYTES #: 0.05 E9/L
IMMATURE GRANULOCYTES %: 0.7 % (ref 0–5)
LYMPHOCYTES ABSOLUTE: 1.74 E9/L (ref 1.5–4)
LYMPHOCYTES RELATIVE PERCENT: 25.4 % (ref 20–42)
MCH RBC QN AUTO: 29.3 PG (ref 26–35)
MCHC RBC AUTO-ENTMCNC: 31.3 % (ref 32–34.5)
MCV RBC AUTO: 93.8 FL (ref 80–99.9)
METER GLUCOSE: 119 MG/DL (ref 70–110)
MONOCYTES ABSOLUTE: 0.66 E9/L (ref 0.1–0.95)
MONOCYTES RELATIVE PERCENT: 9.6 % (ref 2–12)
NEUTROPHILS ABSOLUTE: 4.29 E9/L (ref 1.8–7.3)
NEUTROPHILS RELATIVE PERCENT: 62.5 % (ref 43–80)
PDW BLD-RTO: 13.9 FL (ref 11.5–15)
PLATELET # BLD: 549 E9/L (ref 130–450)
PMV BLD AUTO: 9.4 FL (ref 7–12)
RBC # BLD: 2.76 E12/L (ref 3.8–5.8)
WBC # BLD: 6.9 E9/L (ref 4.5–11.5)

## 2018-04-27 PROCEDURE — 97530 THERAPEUTIC ACTIVITIES: CPT

## 2018-04-27 PROCEDURE — 6370000000 HC RX 637 (ALT 250 FOR IP): Performed by: STUDENT IN AN ORGANIZED HEALTH CARE EDUCATION/TRAINING PROGRAM

## 2018-04-27 PROCEDURE — 6370000000 HC RX 637 (ALT 250 FOR IP): Performed by: SURGERY

## 2018-04-27 PROCEDURE — 6370000000 HC RX 637 (ALT 250 FOR IP): Performed by: NURSE PRACTITIONER

## 2018-04-27 PROCEDURE — 97535 SELF CARE MNGMENT TRAINING: CPT

## 2018-04-27 PROCEDURE — APPSS30 APP SPLIT SHARED TIME 16-30 MINUTES: Performed by: NURSE PRACTITIONER

## 2018-04-27 PROCEDURE — 99233 SBSQ HOSP IP/OBS HIGH 50: CPT | Performed by: SURGERY

## 2018-04-27 PROCEDURE — 36415 COLL VENOUS BLD VENIPUNCTURE: CPT

## 2018-04-27 PROCEDURE — 6360000002 HC RX W HCPCS: Performed by: STUDENT IN AN ORGANIZED HEALTH CARE EDUCATION/TRAINING PROGRAM

## 2018-04-27 PROCEDURE — 94640 AIRWAY INHALATION TREATMENT: CPT

## 2018-04-27 PROCEDURE — 82962 GLUCOSE BLOOD TEST: CPT

## 2018-04-27 PROCEDURE — 85025 COMPLETE CBC W/AUTO DIFF WBC: CPT

## 2018-04-27 PROCEDURE — 92526 ORAL FUNCTION THERAPY: CPT

## 2018-04-27 RX ORDER — GLYCOPYRROLATE 1 MG/1
1 TABLET ORAL 3 TIMES DAILY PRN
Qty: 90 TABLET | Refills: 0 | Status: ON HOLD | DISCHARGE
Start: 2018-04-27 | End: 2020-12-13

## 2018-04-27 RX ORDER — LANOLIN ALCOHOL/MO/W.PET/CERES
3 CREAM (GRAM) TOPICAL NIGHTLY
Refills: 3 | DISCHARGE
Start: 2018-04-27 | End: 2018-06-06

## 2018-04-27 RX ORDER — METHOCARBAMOL 500 MG/1
1000 TABLET, FILM COATED ORAL 4 TIMES DAILY
Qty: 80 TABLET | Refills: 0 | DISCHARGE
Start: 2018-04-27 | End: 2018-05-07

## 2018-04-27 RX ORDER — ACETAMINOPHEN 325 MG/1
650 TABLET ORAL EVERY 4 HOURS PRN
Qty: 120 TABLET | Refills: 3 | DISCHARGE
Start: 2018-04-27 | End: 2021-03-17 | Stop reason: ALTCHOICE

## 2018-04-27 RX ORDER — PANTOPRAZOLE SODIUM 40 MG/1
40 TABLET, DELAYED RELEASE ORAL
Qty: 30 TABLET | Refills: 3 | Status: ON HOLD | DISCHARGE
Start: 2018-04-28 | End: 2020-12-13

## 2018-04-27 RX ORDER — AMOXICILLIN AND CLAVULANATE POTASSIUM 875; 125 MG/1; MG/1
1 TABLET, FILM COATED ORAL EVERY 12 HOURS SCHEDULED
Qty: 20 TABLET | Refills: 0 | DISCHARGE
Start: 2018-04-27 | End: 2018-05-04

## 2018-04-27 RX ORDER — IPRATROPIUM BROMIDE AND ALBUTEROL SULFATE 2.5; .5 MG/3ML; MG/3ML
3 SOLUTION RESPIRATORY (INHALATION)
Qty: 360 ML | Status: ON HOLD | DISCHARGE
Start: 2018-04-27 | End: 2020-12-13

## 2018-04-27 RX ORDER — RISPERIDONE 2 MG/1
2 TABLET, FILM COATED ORAL 2 TIMES DAILY
Qty: 60 TABLET | Refills: 3 | DISCHARGE
Start: 2018-04-27 | End: 2018-06-06

## 2018-04-27 RX ORDER — MULTIVITAMIN WITH FOLIC ACID 400 MCG
1 TABLET ORAL DAILY
Refills: 0 | DISCHARGE
Start: 2018-04-28

## 2018-04-27 RX ADMIN — NYSTATIN 500000 UNITS: 100000 SUSPENSION ORAL at 09:00

## 2018-04-27 RX ADMIN — OXYCODONE HYDROCHLORIDE 15 MG: 5 SOLUTION ORAL at 16:18

## 2018-04-27 RX ADMIN — AMOXICILLIN AND CLAVULANATE POTASSIUM 1 TABLET: 875; 125 TABLET, FILM COATED ORAL at 09:02

## 2018-04-27 RX ADMIN — VERAPAMIL HYDROCHLORIDE 80 MG: 80 TABLET, FILM COATED ORAL at 09:01

## 2018-04-27 RX ADMIN — RISPERIDONE 2 MG: 2 TABLET, FILM COATED ORAL at 09:02

## 2018-04-27 RX ADMIN — OXYCODONE HYDROCHLORIDE 15 MG: 5 SOLUTION ORAL at 05:35

## 2018-04-27 RX ADMIN — METHOCARBAMOL 1000 MG: 500 TABLET ORAL at 14:17

## 2018-04-27 RX ADMIN — MULTIVITAMIN TABLET 1 TABLET: TABLET at 09:02

## 2018-04-27 RX ADMIN — STANDARDIZED SENNA CONCENTRATE AND DOCUSATE SODIUM 1 TABLET: 8.6; 5 TABLET, FILM COATED ORAL at 09:00

## 2018-04-27 RX ADMIN — METHOCARBAMOL 1000 MG: 500 TABLET ORAL at 09:01

## 2018-04-27 RX ADMIN — POTASSIUM & SODIUM PHOSPHATES POWDER PACK 280-160-250 MG 250 MG: 280-160-250 PACK at 14:17

## 2018-04-27 RX ADMIN — PREGABALIN 100 MG: 100 CAPSULE ORAL at 09:00

## 2018-04-27 RX ADMIN — OXYCODONE HYDROCHLORIDE 15 MG: 5 SOLUTION ORAL at 11:38

## 2018-04-27 RX ADMIN — CARVEDILOL 25 MG: 25 TABLET, FILM COATED ORAL at 16:25

## 2018-04-27 RX ADMIN — IPRATROPIUM BROMIDE AND ALBUTEROL SULFATE 1 AMPULE: 2.5; .5 SOLUTION RESPIRATORY (INHALATION) at 09:36

## 2018-04-27 RX ADMIN — IPRATROPIUM BROMIDE AND ALBUTEROL SULFATE 1 AMPULE: 2.5; .5 SOLUTION RESPIRATORY (INHALATION) at 12:53

## 2018-04-27 RX ADMIN — PANTOPRAZOLE SODIUM 40 MG: 40 TABLET, DELAYED RELEASE ORAL at 06:29

## 2018-04-27 RX ADMIN — POTASSIUM & SODIUM PHOSPHATES POWDER PACK 280-160-250 MG 250 MG: 280-160-250 PACK at 09:01

## 2018-04-27 RX ADMIN — VERAPAMIL HYDROCHLORIDE 80 MG: 80 TABLET, FILM COATED ORAL at 16:24

## 2018-04-27 RX ADMIN — NYSTATIN 500000 UNITS: 100000 SUSPENSION ORAL at 14:17

## 2018-04-27 RX ADMIN — DOCUSATE SODIUM 100 MG: 100 CAPSULE, LIQUID FILLED ORAL at 09:00

## 2018-04-27 RX ADMIN — GLYCOPYRROLATE 1 MG: 1 TABLET ORAL at 14:17

## 2018-04-27 RX ADMIN — GLYCOPYRROLATE 1 MG: 1 TABLET ORAL at 09:01

## 2018-04-27 RX ADMIN — POLYETHYLENE GLYCOL 3350 17 G: 17 POWDER, FOR SOLUTION ORAL at 09:00

## 2018-04-27 RX ADMIN — ENOXAPARIN SODIUM 30 MG: 30 INJECTION SUBCUTANEOUS at 09:00

## 2018-04-27 ASSESSMENT — PAIN SCALES - GENERAL
PAINLEVEL_OUTOF10: 8
PAINLEVEL_OUTOF10: 0
PAINLEVEL_OUTOF10: 8
PAINLEVEL_OUTOF10: 2
PAINLEVEL_OUTOF10: 10
PAINLEVEL_OUTOF10: 0

## 2018-04-27 ASSESSMENT — PAIN DESCRIPTION - PAIN TYPE
TYPE: ACUTE PAIN
TYPE: SURGICAL PAIN
TYPE: ACUTE PAIN

## 2018-04-27 ASSESSMENT — PAIN DESCRIPTION - ORIENTATION
ORIENTATION: LEFT
ORIENTATION: RIGHT;LEFT
ORIENTATION: RIGHT;LEFT

## 2018-04-27 ASSESSMENT — PAIN DESCRIPTION - LOCATION
LOCATION: LEG
LOCATION: LEG
LOCATION: LEG;ARM

## 2018-04-27 ASSESSMENT — PAIN DESCRIPTION - FREQUENCY: FREQUENCY: CONTINUOUS

## 2018-04-27 ASSESSMENT — PAIN DESCRIPTION - ONSET: ONSET: ON-GOING

## 2018-04-27 ASSESSMENT — PAIN DESCRIPTION - DESCRIPTORS
DESCRIPTORS: ACHING;CONSTANT;DISCOMFORT
DESCRIPTORS: SHARP

## 2018-05-01 ENCOUNTER — TELEPHONE (OUTPATIENT)
Dept: ORTHOPEDIC SURGERY | Age: 53
End: 2018-05-01

## 2018-05-07 DIAGNOSIS — S92.001A: Primary | ICD-10-CM

## 2018-05-07 DIAGNOSIS — S82.202A TIBIA/FIBULA FRACTURE, LEFT, CLOSED, INITIAL ENCOUNTER: ICD-10-CM

## 2018-05-07 DIAGNOSIS — S42.022D CLOSED DISPLACED FRACTURE OF SHAFT OF LEFT CLAVICLE WITH ROUTINE HEALING, SUBSEQUENT ENCOUNTER: ICD-10-CM

## 2018-05-07 DIAGNOSIS — S82.402A TIBIA/FIBULA FRACTURE, LEFT, CLOSED, INITIAL ENCOUNTER: ICD-10-CM

## 2018-05-08 ENCOUNTER — TELEPHONE (OUTPATIENT)
Dept: SURGERY | Age: 53
End: 2018-05-08

## 2018-05-09 ENCOUNTER — HOSPITAL ENCOUNTER (OUTPATIENT)
Age: 53
Discharge: HOME | End: 2018-05-11
Payer: MEDICARE

## 2018-05-09 ENCOUNTER — OFFICE VISIT (OUTPATIENT)
Dept: ORTHOPEDIC SURGERY | Age: 53
End: 2018-05-09
Payer: MEDICARE

## 2018-05-09 ENCOUNTER — HOSPITAL ENCOUNTER (OUTPATIENT)
Dept: ULTRASOUND IMAGING | Age: 53
Discharge: HOME | End: 2018-05-11
Payer: MEDICARE

## 2018-05-09 ENCOUNTER — HOSPITAL ENCOUNTER (OUTPATIENT)
Age: 53
End: 2018-05-09
Payer: MEDICARE

## 2018-05-09 ENCOUNTER — HOSPITAL ENCOUNTER (OUTPATIENT)
Dept: GENERAL RADIOLOGY | Age: 53
Discharge: HOME | End: 2018-05-11
Payer: MEDICARE

## 2018-05-09 VITALS
BODY MASS INDEX: 23.8 KG/M2 | WEIGHT: 170 LBS | HEIGHT: 71 IN | DIASTOLIC BLOOD PRESSURE: 72 MMHG | HEART RATE: 82 BPM | SYSTOLIC BLOOD PRESSURE: 124 MMHG

## 2018-05-09 DIAGNOSIS — S82.142D CLOSED DISPLACED BICONDYLAR FRACTURE OF LEFT TIBIA WITH ROUTINE HEALING: ICD-10-CM

## 2018-05-09 DIAGNOSIS — G57.32 PERONEAL PALSY, LEFT: ICD-10-CM

## 2018-05-09 DIAGNOSIS — S42.022D CLOSED DISPLACED FRACTURE OF SHAFT OF LEFT CLAVICLE WITH ROUTINE HEALING, SUBSEQUENT ENCOUNTER: Primary | ICD-10-CM

## 2018-05-09 DIAGNOSIS — M79.661 BILATERAL CALF PAIN: ICD-10-CM

## 2018-05-09 DIAGNOSIS — S42.022D CLOSED DISPLACED FRACTURE OF SHAFT OF LEFT CLAVICLE WITH ROUTINE HEALING, SUBSEQUENT ENCOUNTER: ICD-10-CM

## 2018-05-09 DIAGNOSIS — S92.001A: ICD-10-CM

## 2018-05-09 DIAGNOSIS — M79.662 BILATERAL CALF PAIN: ICD-10-CM

## 2018-05-09 DIAGNOSIS — S82.202A TIBIA/FIBULA FRACTURE, LEFT, CLOSED, INITIAL ENCOUNTER: ICD-10-CM

## 2018-05-09 DIAGNOSIS — S82.402A TIBIA/FIBULA FRACTURE, LEFT, CLOSED, INITIAL ENCOUNTER: ICD-10-CM

## 2018-05-09 DIAGNOSIS — M79.89 CALF SWELLING: ICD-10-CM

## 2018-05-09 PROCEDURE — 73650 X-RAY EXAM OF HEEL: CPT

## 2018-05-09 PROCEDURE — L4386 NON-PNEUM WALK BOOT PRE CST: HCPCS | Performed by: ORTHOPAEDIC SURGERY

## 2018-05-09 PROCEDURE — 99213 OFFICE O/P EST LOW 20 MIN: CPT | Performed by: ORTHOPAEDIC SURGERY

## 2018-05-09 PROCEDURE — 99024 POSTOP FOLLOW-UP VISIT: CPT | Performed by: NURSE PRACTITIONER

## 2018-05-09 PROCEDURE — 93970 EXTREMITY STUDY: CPT

## 2018-05-09 PROCEDURE — 73000 X-RAY EXAM OF COLLAR BONE: CPT

## 2018-05-09 PROCEDURE — 73560 X-RAY EXAM OF KNEE 1 OR 2: CPT

## 2018-05-09 RX ORDER — OXYCODONE HYDROCHLORIDE 5 MG/1
5 TABLET ORAL EVERY 6 HOURS PRN
COMMUNITY
End: 2020-12-13

## 2018-05-21 LAB
FUNGUS (MYCOLOGY) CULTURE: NORMAL
FUNGUS STAIN: NORMAL

## 2018-05-30 ENCOUNTER — TELEPHONE (OUTPATIENT)
Dept: ORTHOPEDIC SURGERY | Age: 53
End: 2018-05-30

## 2018-06-05 ENCOUNTER — OFFICE VISIT (OUTPATIENT)
Dept: SURGERY | Age: 53
End: 2018-06-05
Payer: MEDICARE

## 2018-06-05 ENCOUNTER — TELEPHONE (OUTPATIENT)
Dept: SURGERY | Age: 53
End: 2018-06-05

## 2018-06-05 VITALS
TEMPERATURE: 98.3 F | BODY MASS INDEX: 26.8 KG/M2 | DIASTOLIC BLOOD PRESSURE: 53 MMHG | SYSTOLIC BLOOD PRESSURE: 104 MMHG | HEIGHT: 71 IN | WEIGHT: 191.4 LBS | RESPIRATION RATE: 18 BRPM | HEART RATE: 67 BPM | OXYGEN SATURATION: 98 %

## 2018-06-05 DIAGNOSIS — J96.02 ACUTE RESPIRATORY FAILURE WITH HYPOXIA AND HYPERCAPNIA (HCC): ICD-10-CM

## 2018-06-05 DIAGNOSIS — J96.01 ACUTE RESPIRATORY FAILURE WITH HYPOXIA AND HYPERCAPNIA (HCC): ICD-10-CM

## 2018-06-05 LAB
AFB CULTURE (MYCOBACTERIA): NORMAL
AFB SMEAR: NORMAL

## 2018-06-05 PROCEDURE — 99212 OFFICE O/P EST SF 10 MIN: CPT | Performed by: SURGERY

## 2018-06-05 PROCEDURE — 3017F COLORECTAL CA SCREEN DOC REV: CPT | Performed by: SURGERY

## 2018-06-05 PROCEDURE — G8419 CALC BMI OUT NRM PARAM NOF/U: HCPCS | Performed by: SURGERY

## 2018-06-05 PROCEDURE — 1036F TOBACCO NON-USER: CPT | Performed by: SURGERY

## 2018-06-05 PROCEDURE — 99211 OFF/OP EST MAY X REQ PHY/QHP: CPT | Performed by: SURGERY

## 2018-06-05 PROCEDURE — G8427 DOCREV CUR MEDS BY ELIG CLIN: HCPCS | Performed by: SURGERY

## 2018-06-06 ENCOUNTER — TELEPHONE (OUTPATIENT)
Dept: ADMINISTRATIVE | Age: 53
End: 2018-06-06

## 2018-06-06 ENCOUNTER — HOSPITAL ENCOUNTER (OUTPATIENT)
Dept: GENERAL RADIOLOGY | Age: 53
Discharge: HOME | End: 2018-06-08
Payer: MEDICARE

## 2018-06-06 ENCOUNTER — HOSPITAL ENCOUNTER (INPATIENT)
Age: 53
LOS: 6 days | Discharge: NURSING, REHAB OR LONG-TERM CARE | DRG: 885 | End: 2018-06-12
Attending: EMERGENCY MEDICINE | Admitting: PSYCHIATRY & NEUROLOGY
Payer: MEDICARE

## 2018-06-06 ENCOUNTER — OFFICE VISIT (OUTPATIENT)
Dept: ORTHOPEDIC SURGERY | Age: 53
End: 2018-06-06
Payer: MEDICARE

## 2018-06-06 VITALS
WEIGHT: 190 LBS | HEIGHT: 71 IN | DIASTOLIC BLOOD PRESSURE: 62 MMHG | TEMPERATURE: 98 F | BODY MASS INDEX: 26.6 KG/M2 | SYSTOLIC BLOOD PRESSURE: 108 MMHG | HEART RATE: 71 BPM

## 2018-06-06 DIAGNOSIS — S82.142D CLOSED DISPLACED BICONDYLAR FRACTURE OF LEFT TIBIA WITH ROUTINE HEALING: ICD-10-CM

## 2018-06-06 DIAGNOSIS — S42.022D CLOSED DISPLACED FRACTURE OF SHAFT OF LEFT CLAVICLE WITH ROUTINE HEALING, SUBSEQUENT ENCOUNTER: ICD-10-CM

## 2018-06-06 DIAGNOSIS — S42.022D CLOSED DISPLACED FRACTURE OF SHAFT OF LEFT CLAVICLE WITH ROUTINE HEALING, SUBSEQUENT ENCOUNTER: Primary | ICD-10-CM

## 2018-06-06 DIAGNOSIS — S92.001A: ICD-10-CM

## 2018-06-06 DIAGNOSIS — R45.851 SUICIDAL IDEATION: Primary | ICD-10-CM

## 2018-06-06 DIAGNOSIS — F32.A DEPRESSION, UNSPECIFIED DEPRESSION TYPE: ICD-10-CM

## 2018-06-06 LAB
ACETAMINOPHEN LEVEL: <5 MCG/ML (ref 10–30)
ALBUMIN SERPL-MCNC: 4.3 G/DL (ref 3.5–5.2)
ALP BLD-CCNC: 166 U/L (ref 40–129)
ALT SERPL-CCNC: 76 U/L (ref 0–40)
AMPHETAMINE SCREEN, URINE: NOT DETECTED
ANION GAP SERPL CALCULATED.3IONS-SCNC: 13 MMOL/L (ref 7–16)
AST SERPL-CCNC: 33 U/L (ref 0–39)
BARBITURATE SCREEN URINE: NOT DETECTED
BASOPHILS ABSOLUTE: 0.06 E9/L (ref 0–0.2)
BASOPHILS RELATIVE PERCENT: 0.6 % (ref 0–2)
BENZODIAZEPINE SCREEN, URINE: NOT DETECTED
BILIRUB SERPL-MCNC: 0.3 MG/DL (ref 0–1.2)
BILIRUBIN URINE: NEGATIVE
BLOOD, URINE: NEGATIVE
BUN BLDV-MCNC: 12 MG/DL (ref 6–20)
CALCIUM SERPL-MCNC: 9.6 MG/DL (ref 8.6–10.2)
CANNABINOID SCREEN URINE: NOT DETECTED
CHLORIDE BLD-SCNC: 97 MMOL/L (ref 98–107)
CLARITY: CLEAR
CO2: 29 MMOL/L (ref 22–29)
COCAINE METABOLITE SCREEN URINE: NOT DETECTED
COLOR: YELLOW
CREAT SERPL-MCNC: 0.9 MG/DL (ref 0.7–1.2)
EOSINOPHILS ABSOLUTE: 0.13 E9/L (ref 0.05–0.5)
EOSINOPHILS RELATIVE PERCENT: 1.4 % (ref 0–6)
ETHANOL: <10 MG/DL (ref 0–0.08)
GFR AFRICAN AMERICAN: >60
GFR NON-AFRICAN AMERICAN: >60 ML/MIN/1.73
GLUCOSE BLD-MCNC: 82 MG/DL (ref 74–109)
GLUCOSE URINE: NEGATIVE MG/DL
HCT VFR BLD CALC: 35.6 % (ref 37–54)
HEMOGLOBIN: 11.3 G/DL (ref 12.5–16.5)
IMMATURE GRANULOCYTES #: 0.06 E9/L
IMMATURE GRANULOCYTES %: 0.6 % (ref 0–5)
KETONES, URINE: NEGATIVE MG/DL
LEUKOCYTE ESTERASE, URINE: NEGATIVE
LYMPHOCYTES ABSOLUTE: 1.59 E9/L (ref 1.5–4)
LYMPHOCYTES RELATIVE PERCENT: 16.6 % (ref 20–42)
MCH RBC QN AUTO: 28.1 PG (ref 26–35)
MCHC RBC AUTO-ENTMCNC: 31.7 % (ref 32–34.5)
MCV RBC AUTO: 88.6 FL (ref 80–99.9)
METHADONE SCREEN, URINE: NOT DETECTED
MONOCYTES ABSOLUTE: 0.76 E9/L (ref 0.1–0.95)
MONOCYTES RELATIVE PERCENT: 7.9 % (ref 2–12)
NEUTROPHILS ABSOLUTE: 6.99 E9/L (ref 1.8–7.3)
NEUTROPHILS RELATIVE PERCENT: 72.9 % (ref 43–80)
NITRITE, URINE: NEGATIVE
OPIATE SCREEN URINE: NOT DETECTED
PDW BLD-RTO: 14 FL (ref 11.5–15)
PH UA: 6 (ref 5–9)
PHENCYCLIDINE SCREEN URINE: NOT DETECTED
PLATELET # BLD: 303 E9/L (ref 130–450)
PMV BLD AUTO: 9.4 FL (ref 7–12)
POTASSIUM SERPL-SCNC: 4.3 MMOL/L (ref 3.5–5)
PROPOXYPHENE SCREEN: NOT DETECTED
PROTEIN UA: NEGATIVE MG/DL
RBC # BLD: 4.02 E12/L (ref 3.8–5.8)
SALICYLATE, SERUM: <0.3 MG/DL (ref 0–30)
SODIUM BLD-SCNC: 139 MMOL/L (ref 132–146)
SPECIFIC GRAVITY UA: 1.02 (ref 1–1.03)
TOTAL PROTEIN: 7.7 G/DL (ref 6.4–8.3)
TRICYCLIC ANTIDEPRESSANTS SCREEN SERUM: NEGATIVE NG/ML
UROBILINOGEN, URINE: 1 E.U./DL
WBC # BLD: 9.6 E9/L (ref 4.5–11.5)

## 2018-06-06 PROCEDURE — 73560 X-RAY EXAM OF KNEE 1 OR 2: CPT

## 2018-06-06 PROCEDURE — 99284 EMERGENCY DEPT VISIT MOD MDM: CPT

## 2018-06-06 PROCEDURE — 6370000000 HC RX 637 (ALT 250 FOR IP): Performed by: PSYCHIATRY & NEUROLOGY

## 2018-06-06 PROCEDURE — 80053 COMPREHEN METABOLIC PANEL: CPT

## 2018-06-06 PROCEDURE — 99024 POSTOP FOLLOW-UP VISIT: CPT | Performed by: PHYSICIAN ASSISTANT

## 2018-06-06 PROCEDURE — 81003 URINALYSIS AUTO W/O SCOPE: CPT

## 2018-06-06 PROCEDURE — 73000 X-RAY EXAM OF COLLAR BONE: CPT

## 2018-06-06 PROCEDURE — 80307 DRUG TEST PRSMV CHEM ANLYZR: CPT

## 2018-06-06 PROCEDURE — 1240000000 HC EMOTIONAL WELLNESS R&B

## 2018-06-06 PROCEDURE — 85025 COMPLETE CBC W/AUTO DIFF WBC: CPT

## 2018-06-06 PROCEDURE — 36415 COLL VENOUS BLD VENIPUNCTURE: CPT

## 2018-06-06 PROCEDURE — G0480 DRUG TEST DEF 1-7 CLASSES: HCPCS

## 2018-06-06 PROCEDURE — 99212 OFFICE O/P EST SF 10 MIN: CPT

## 2018-06-06 PROCEDURE — 73650 X-RAY EXAM OF HEEL: CPT

## 2018-06-06 RX ORDER — GABAPENTIN 100 MG/1
100 CAPSULE ORAL NIGHTLY
Status: ON HOLD | COMMUNITY
End: 2020-12-13

## 2018-06-06 RX ORDER — ASPIRIN 325 MG
325 TABLET ORAL 2 TIMES DAILY
Status: DISCONTINUED | OUTPATIENT
Start: 2018-06-06 | End: 2018-06-12 | Stop reason: HOSPADM

## 2018-06-06 RX ORDER — ACETAMINOPHEN 325 MG/1
650 TABLET ORAL EVERY 4 HOURS PRN
Status: DISCONTINUED | OUTPATIENT
Start: 2018-06-06 | End: 2018-06-06 | Stop reason: SDUPTHER

## 2018-06-06 RX ORDER — DIPHENHYDRAMINE HCL 25 MG
25 TABLET ORAL NIGHTLY PRN
Status: DISCONTINUED | OUTPATIENT
Start: 2018-06-06 | End: 2018-06-12 | Stop reason: HOSPADM

## 2018-06-06 RX ORDER — OXYCODONE HYDROCHLORIDE 5 MG/1
5 TABLET ORAL EVERY 6 HOURS PRN
Status: DISCONTINUED | OUTPATIENT
Start: 2018-06-06 | End: 2018-06-12 | Stop reason: HOSPADM

## 2018-06-06 RX ORDER — PRAVASTATIN SODIUM 20 MG
20 TABLET ORAL NIGHTLY
Status: DISCONTINUED | OUTPATIENT
Start: 2018-06-06 | End: 2018-06-12 | Stop reason: HOSPADM

## 2018-06-06 RX ORDER — ZIPRASIDONE MESYLATE 20 MG/ML
0.5 INJECTION, POWDER, LYOPHILIZED, FOR SOLUTION INTRAMUSCULAR EVERY 6 HOURS PRN
Status: DISCONTINUED | OUTPATIENT
Start: 2018-06-06 | End: 2018-06-12 | Stop reason: HOSPADM

## 2018-06-06 RX ORDER — ASPIRIN 325 MG
325 TABLET ORAL 2 TIMES DAILY
Status: ON HOLD | COMMUNITY
End: 2020-12-15 | Stop reason: HOSPADM

## 2018-06-06 RX ORDER — LOSARTAN POTASSIUM 50 MG/1
100 TABLET ORAL DAILY
Status: DISCONTINUED | OUTPATIENT
Start: 2018-06-06 | End: 2018-06-12 | Stop reason: HOSPADM

## 2018-06-06 RX ORDER — MULTIVITAMIN WITH FOLIC ACID 400 MCG
1 TABLET ORAL DAILY
Status: DISCONTINUED | OUTPATIENT
Start: 2018-06-06 | End: 2018-06-12 | Stop reason: HOSPADM

## 2018-06-06 RX ORDER — CARVEDILOL 25 MG/1
25 TABLET ORAL 2 TIMES DAILY WITH MEALS
Status: DISCONTINUED | OUTPATIENT
Start: 2018-06-06 | End: 2018-06-12 | Stop reason: HOSPADM

## 2018-06-06 RX ORDER — IPRATROPIUM BROMIDE AND ALBUTEROL SULFATE 2.5; .5 MG/3ML; MG/3ML
3 SOLUTION RESPIRATORY (INHALATION)
Status: DISCONTINUED | OUTPATIENT
Start: 2018-06-07 | End: 2018-06-12 | Stop reason: HOSPADM

## 2018-06-06 RX ORDER — ERGOCALCIFEROL 1.25 MG/1
50000 CAPSULE ORAL WEEKLY
Status: DISCONTINUED | OUTPATIENT
Start: 2018-06-07 | End: 2018-06-12 | Stop reason: HOSPADM

## 2018-06-06 RX ORDER — GLYCOPYRROLATE 1 MG/1
1 TABLET ORAL EVERY 8 HOURS
Status: DISCONTINUED | OUTPATIENT
Start: 2018-06-06 | End: 2018-06-12 | Stop reason: HOSPADM

## 2018-06-06 RX ORDER — ACETAMINOPHEN 325 MG/1
650 TABLET ORAL EVERY 4 HOURS PRN
Status: DISCONTINUED | OUTPATIENT
Start: 2018-06-06 | End: 2018-06-12 | Stop reason: HOSPADM

## 2018-06-06 RX ORDER — MAGNESIUM HYDROXIDE/ALUMINUM HYDROXICE/SIMETHICONE 120; 1200; 1200 MG/30ML; MG/30ML; MG/30ML
30 SUSPENSION ORAL PRN
Status: DISCONTINUED | OUTPATIENT
Start: 2018-06-06 | End: 2018-06-12 | Stop reason: HOSPADM

## 2018-06-06 RX ORDER — CHOLECALCIFEROL (VITAMIN D3) 1250 MCG
1 CAPSULE ORAL WEEKLY
COMMUNITY

## 2018-06-06 RX ORDER — VERAPAMIL HYDROCHLORIDE 240 MG/1
240 TABLET, FILM COATED, EXTENDED RELEASE ORAL
Status: DISCONTINUED | OUTPATIENT
Start: 2018-06-07 | End: 2018-06-12 | Stop reason: HOSPADM

## 2018-06-06 RX ORDER — GABAPENTIN 100 MG/1
100 CAPSULE ORAL NIGHTLY
Status: DISCONTINUED | OUTPATIENT
Start: 2018-06-06 | End: 2018-06-12 | Stop reason: HOSPADM

## 2018-06-06 RX ORDER — PANTOPRAZOLE SODIUM 40 MG/1
40 TABLET, DELAYED RELEASE ORAL
Status: DISCONTINUED | OUTPATIENT
Start: 2018-06-07 | End: 2018-06-12 | Stop reason: HOSPADM

## 2018-06-06 RX ADMIN — GLYCOPYRROLATE 1 MG: 1 TABLET ORAL at 21:57

## 2018-06-06 RX ADMIN — CARVEDILOL 25 MG: 25 TABLET, FILM COATED ORAL at 21:56

## 2018-06-06 RX ADMIN — PRAVASTATIN SODIUM 20 MG: 20 TABLET ORAL at 21:57

## 2018-06-06 RX ADMIN — OXYCODONE HYDROCHLORIDE 5 MG: 5 TABLET ORAL at 22:01

## 2018-06-06 RX ADMIN — MULTIVITAMIN TABLET 1 TABLET: TABLET at 21:55

## 2018-06-06 RX ADMIN — POTASSIUM & SODIUM PHOSPHATES POWDER PACK 280-160-250 MG 250 MG: 280-160-250 PACK at 21:57

## 2018-06-06 RX ADMIN — ASPIRIN 325 MG: 325 TABLET ORAL at 21:56

## 2018-06-06 RX ADMIN — GABAPENTIN 100 MG: 100 CAPSULE ORAL at 21:56

## 2018-06-06 RX ADMIN — LOSARTAN POTASSIUM 100 MG: 50 TABLET, FILM COATED ORAL at 21:55

## 2018-06-06 ASSESSMENT — PAIN SCALES - GENERAL: PAINLEVEL_OUTOF10: 10

## 2018-06-07 PROBLEM — S92.001A CLOSED FRACTURE OF RIGHT CALCANEUS: Status: ACTIVE | Noted: 2018-06-07

## 2018-06-07 PROBLEM — Z93.0 TRACHEOSTOMY IN PLACE (HCC): Status: ACTIVE | Noted: 2018-06-07

## 2018-06-07 PROBLEM — F33.3 DEPRESSION, MAJOR, RECURRENT, SEVERE WITH PSYCHOSIS (HCC): Status: ACTIVE | Noted: 2018-06-07

## 2018-06-07 LAB
CHOLESTEROL, TOTAL: 154 MG/DL (ref 0–199)
HBA1C MFR BLD: 5.3 % (ref 4.8–5.9)
HDLC SERPL-MCNC: 33 MG/DL
LDL CHOLESTEROL CALCULATED: 98 MG/DL (ref 0–99)
TRIGL SERPL-MCNC: 113 MG/DL (ref 0–149)
VLDLC SERPL CALC-MCNC: 23 MG/DL

## 2018-06-07 PROCEDURE — 83036 HEMOGLOBIN GLYCOSYLATED A1C: CPT

## 2018-06-07 PROCEDURE — 99222 1ST HOSP IP/OBS MODERATE 55: CPT | Performed by: PSYCHIATRY & NEUROLOGY

## 2018-06-07 PROCEDURE — 1240000000 HC EMOTIONAL WELLNESS R&B

## 2018-06-07 PROCEDURE — 80061 LIPID PANEL: CPT

## 2018-06-07 PROCEDURE — 6370000000 HC RX 637 (ALT 250 FOR IP): Performed by: PSYCHIATRY & NEUROLOGY

## 2018-06-07 PROCEDURE — 94664 DEMO&/EVAL PT USE INHALER: CPT

## 2018-06-07 PROCEDURE — 36415 COLL VENOUS BLD VENIPUNCTURE: CPT

## 2018-06-07 PROCEDURE — 94640 AIRWAY INHALATION TREATMENT: CPT

## 2018-06-07 RX ORDER — PALIPERIDONE 3 MG/1
3 TABLET, EXTENDED RELEASE ORAL DAILY
Status: DISCONTINUED | OUTPATIENT
Start: 2018-06-07 | End: 2018-06-12 | Stop reason: HOSPADM

## 2018-06-07 RX ADMIN — IPRATROPIUM BROMIDE AND ALBUTEROL SULFATE 3 ML: 2.5; .5 SOLUTION RESPIRATORY (INHALATION) at 17:45

## 2018-06-07 RX ADMIN — GABAPENTIN 100 MG: 100 CAPSULE ORAL at 20:47

## 2018-06-07 RX ADMIN — ERGOCALCIFEROL 50000 UNITS: 1.25 CAPSULE ORAL at 10:25

## 2018-06-07 RX ADMIN — POTASSIUM & SODIUM PHOSPHATES POWDER PACK 280-160-250 MG 250 MG: 280-160-250 PACK at 10:25

## 2018-06-07 RX ADMIN — PRAVASTATIN SODIUM 20 MG: 20 TABLET ORAL at 20:47

## 2018-06-07 RX ADMIN — OXYCODONE HYDROCHLORIDE 5 MG: 5 TABLET ORAL at 06:00

## 2018-06-07 RX ADMIN — ACETAMINOPHEN 650 MG: 325 TABLET ORAL at 20:46

## 2018-06-07 RX ADMIN — OXYCODONE HYDROCHLORIDE 5 MG: 5 TABLET ORAL at 12:44

## 2018-06-07 RX ADMIN — DIPHENHYDRAMINE HCL 25 MG: 25 TABLET ORAL at 00:31

## 2018-06-07 RX ADMIN — ASPIRIN 325 MG: 325 TABLET ORAL at 18:04

## 2018-06-07 RX ADMIN — OXYCODONE HYDROCHLORIDE 5 MG: 5 TABLET ORAL at 18:43

## 2018-06-07 RX ADMIN — LOSARTAN POTASSIUM 100 MG: 50 TABLET, FILM COATED ORAL at 10:25

## 2018-06-07 RX ADMIN — IPRATROPIUM BROMIDE AND ALBUTEROL SULFATE 3 ML: 2.5; .5 SOLUTION RESPIRATORY (INHALATION) at 10:30

## 2018-06-07 RX ADMIN — POTASSIUM & SODIUM PHOSPHATES POWDER PACK 280-160-250 MG 250 MG: 280-160-250 PACK at 20:46

## 2018-06-07 RX ADMIN — GLYCOPYRROLATE 1 MG: 1 TABLET ORAL at 20:46

## 2018-06-07 RX ADMIN — ASPIRIN 325 MG: 325 TABLET ORAL at 10:22

## 2018-06-07 RX ADMIN — VERAPAMIL HYDROCHLORIDE 240 MG: 240 TABLET, FILM COATED, EXTENDED RELEASE ORAL at 10:23

## 2018-06-07 RX ADMIN — GLYCOPYRROLATE 1 MG: 1 TABLET ORAL at 12:45

## 2018-06-07 RX ADMIN — MULTIVITAMIN TABLET 1 TABLET: TABLET at 10:22

## 2018-06-07 RX ADMIN — GLYCOPYRROLATE 1 MG: 1 TABLET ORAL at 04:52

## 2018-06-07 RX ADMIN — PANTOPRAZOLE SODIUM 40 MG: 40 TABLET, DELAYED RELEASE ORAL at 06:01

## 2018-06-07 RX ADMIN — CARVEDILOL 25 MG: 25 TABLET, FILM COATED ORAL at 18:04

## 2018-06-07 RX ADMIN — IPRATROPIUM BROMIDE AND ALBUTEROL SULFATE 3 ML: 2.5; .5 SOLUTION RESPIRATORY (INHALATION) at 14:50

## 2018-06-07 RX ADMIN — POTASSIUM & SODIUM PHOSPHATES POWDER PACK 280-160-250 MG 250 MG: 280-160-250 PACK at 18:04

## 2018-06-07 RX ADMIN — CARVEDILOL 25 MG: 25 TABLET, FILM COATED ORAL at 10:25

## 2018-06-07 RX ADMIN — POTASSIUM & SODIUM PHOSPHATES POWDER PACK 280-160-250 MG 250 MG: 280-160-250 PACK at 14:40

## 2018-06-07 RX ADMIN — PALIPERIDONE 3 MG: 3 TABLET, EXTENDED RELEASE ORAL at 10:26

## 2018-06-07 RX ADMIN — ACETAMINOPHEN 650 MG: 325 TABLET ORAL at 10:51

## 2018-06-07 ASSESSMENT — PAIN SCALES - GENERAL
PAINLEVEL_OUTOF10: 10
PAINLEVEL_OUTOF10: 0
PAINLEVEL_OUTOF10: 0
PAINLEVEL_OUTOF10: 10
PAINLEVEL_OUTOF10: 8
PAINLEVEL_OUTOF10: 8
PAINLEVEL_OUTOF10: 3
PAINLEVEL_OUTOF10: 7

## 2018-06-07 ASSESSMENT — SLEEP AND FATIGUE QUESTIONNAIRES
DIFFICULTY ARISING: NO
DIFFICULTY ARISING: NO
AVERAGE NUMBER OF SLEEP HOURS: 2
DIFFICULTY STAYING ASLEEP: YES
RESTFUL SLEEP: NO
SLEEP PATTERN: DIFFICULTY FALLING ASLEEP;DISTURBED/INTERRUPTED SLEEP
DO YOU HAVE DIFFICULTY SLEEPING: YES
DIFFICULTY FALLING ASLEEP: YES
DO YOU HAVE DIFFICULTY SLEEPING: YES
DO YOU USE A SLEEP AID: YES
AVERAGE NUMBER OF SLEEP HOURS: 8
SLEEP PATTERN: DIFFICULTY FALLING ASLEEP
RESTFUL SLEEP: YES
DIFFICULTY STAYING ASLEEP: YES
DO YOU USE A SLEEP AID: YES
DIFFICULTY FALLING ASLEEP: YES

## 2018-06-07 ASSESSMENT — PATIENT HEALTH QUESTIONNAIRE - PHQ9
SUM OF ALL RESPONSES TO PHQ QUESTIONS 1-9: 14
SUM OF ALL RESPONSES TO PHQ QUESTIONS 1-9: 14

## 2018-06-07 ASSESSMENT — LIFESTYLE VARIABLES
HISTORY_ALCOHOL_USE: YES
HISTORY_ALCOHOL_USE: YES

## 2018-06-08 LAB
ALBUMIN SERPL-MCNC: 3.5 G/DL (ref 3.5–5.2)
ALP BLD-CCNC: 140 U/L (ref 40–129)
ALT SERPL-CCNC: 61 U/L (ref 0–40)
ANION GAP SERPL CALCULATED.3IONS-SCNC: 13 MMOL/L (ref 7–16)
AST SERPL-CCNC: 27 U/L (ref 0–39)
BASOPHILS ABSOLUTE: 0.04 E9/L (ref 0–0.2)
BASOPHILS RELATIVE PERCENT: 0.7 % (ref 0–2)
BILIRUB SERPL-MCNC: <0.2 MG/DL (ref 0–1.2)
BUN BLDV-MCNC: 11 MG/DL (ref 6–20)
CALCIUM SERPL-MCNC: 9.7 MG/DL (ref 8.6–10.2)
CHLORIDE BLD-SCNC: 100 MMOL/L (ref 98–107)
CO2: 26 MMOL/L (ref 22–29)
CREAT SERPL-MCNC: 0.7 MG/DL (ref 0.7–1.2)
EOSINOPHILS ABSOLUTE: 0.23 E9/L (ref 0.05–0.5)
EOSINOPHILS RELATIVE PERCENT: 4.1 % (ref 0–6)
GFR AFRICAN AMERICAN: >60
GFR NON-AFRICAN AMERICAN: >60 ML/MIN/1.73
GLUCOSE BLD-MCNC: 128 MG/DL (ref 74–109)
HCT VFR BLD CALC: 33.2 % (ref 37–54)
HEMOGLOBIN: 10.6 G/DL (ref 12.5–16.5)
IMMATURE GRANULOCYTES #: 0.06 E9/L
IMMATURE GRANULOCYTES %: 1.1 % (ref 0–5)
LYMPHOCYTES ABSOLUTE: 1.56 E9/L (ref 1.5–4)
LYMPHOCYTES RELATIVE PERCENT: 27.6 % (ref 20–42)
MAGNESIUM: 2 MG/DL (ref 1.6–2.6)
MCH RBC QN AUTO: 28 PG (ref 26–35)
MCHC RBC AUTO-ENTMCNC: 31.9 % (ref 32–34.5)
MCV RBC AUTO: 87.6 FL (ref 80–99.9)
MONOCYTES ABSOLUTE: 0.52 E9/L (ref 0.1–0.95)
MONOCYTES RELATIVE PERCENT: 9.2 % (ref 2–12)
NEUTROPHILS ABSOLUTE: 3.24 E9/L (ref 1.8–7.3)
NEUTROPHILS RELATIVE PERCENT: 57.3 % (ref 43–80)
PDW BLD-RTO: 13.9 FL (ref 11.5–15)
PHOSPHORUS: 3.9 MG/DL (ref 2.5–4.5)
PLATELET # BLD: 269 E9/L (ref 130–450)
PMV BLD AUTO: 9.7 FL (ref 7–12)
POTASSIUM SERPL-SCNC: 4.1 MMOL/L (ref 3.5–5)
RBC # BLD: 3.79 E12/L (ref 3.8–5.8)
SODIUM BLD-SCNC: 139 MMOL/L (ref 132–146)
TOTAL PROTEIN: 7.1 G/DL (ref 6.4–8.3)
TSH SERPL DL<=0.05 MIU/L-ACNC: 3.14 UIU/ML (ref 0.27–4.2)
WBC # BLD: 5.7 E9/L (ref 4.5–11.5)

## 2018-06-08 PROCEDURE — 94640 AIRWAY INHALATION TREATMENT: CPT

## 2018-06-08 PROCEDURE — 80053 COMPREHEN METABOLIC PANEL: CPT

## 2018-06-08 PROCEDURE — 84443 ASSAY THYROID STIM HORMONE: CPT

## 2018-06-08 PROCEDURE — 36415 COLL VENOUS BLD VENIPUNCTURE: CPT

## 2018-06-08 PROCEDURE — 84100 ASSAY OF PHOSPHORUS: CPT

## 2018-06-08 PROCEDURE — 1240000000 HC EMOTIONAL WELLNESS R&B

## 2018-06-08 PROCEDURE — 85025 COMPLETE CBC W/AUTO DIFF WBC: CPT

## 2018-06-08 PROCEDURE — 83735 ASSAY OF MAGNESIUM: CPT

## 2018-06-08 PROCEDURE — 99232 SBSQ HOSP IP/OBS MODERATE 35: CPT | Performed by: PSYCHIATRY & NEUROLOGY

## 2018-06-08 PROCEDURE — 6370000000 HC RX 637 (ALT 250 FOR IP): Performed by: PSYCHIATRY & NEUROLOGY

## 2018-06-08 RX ADMIN — PANTOPRAZOLE SODIUM 40 MG: 40 TABLET, DELAYED RELEASE ORAL at 06:28

## 2018-06-08 RX ADMIN — ASPIRIN 325 MG: 325 TABLET ORAL at 18:23

## 2018-06-08 RX ADMIN — GLYCOPYRROLATE 1 MG: 1 TABLET ORAL at 05:03

## 2018-06-08 RX ADMIN — SERTRALINE 25 MG: 50 TABLET, FILM COATED ORAL at 14:17

## 2018-06-08 RX ADMIN — OXYCODONE HYDROCHLORIDE 5 MG: 5 TABLET ORAL at 19:04

## 2018-06-08 RX ADMIN — POTASSIUM & SODIUM PHOSPHATES POWDER PACK 280-160-250 MG 250 MG: 280-160-250 PACK at 10:15

## 2018-06-08 RX ADMIN — IPRATROPIUM BROMIDE AND ALBUTEROL SULFATE 3 ML: 2.5; .5 SOLUTION RESPIRATORY (INHALATION) at 16:12

## 2018-06-08 RX ADMIN — OXYCODONE HYDROCHLORIDE 5 MG: 5 TABLET ORAL at 12:56

## 2018-06-08 RX ADMIN — VERAPAMIL HYDROCHLORIDE 240 MG: 240 TABLET, FILM COATED, EXTENDED RELEASE ORAL at 10:09

## 2018-06-08 RX ADMIN — OXYCODONE HYDROCHLORIDE 5 MG: 5 TABLET ORAL at 06:28

## 2018-06-08 RX ADMIN — GLYCOPYRROLATE 1 MG: 1 TABLET ORAL at 20:58

## 2018-06-08 RX ADMIN — PRAVASTATIN SODIUM 20 MG: 20 TABLET ORAL at 20:58

## 2018-06-08 RX ADMIN — MULTIVITAMIN TABLET 1 TABLET: TABLET at 10:09

## 2018-06-08 RX ADMIN — IPRATROPIUM BROMIDE AND ALBUTEROL SULFATE 3 ML: 2.5; .5 SOLUTION RESPIRATORY (INHALATION) at 10:40

## 2018-06-08 RX ADMIN — CARVEDILOL 25 MG: 25 TABLET, FILM COATED ORAL at 18:23

## 2018-06-08 RX ADMIN — LOSARTAN POTASSIUM 100 MG: 50 TABLET, FILM COATED ORAL at 10:09

## 2018-06-08 RX ADMIN — POTASSIUM & SODIUM PHOSPHATES POWDER PACK 280-160-250 MG 250 MG: 280-160-250 PACK at 20:58

## 2018-06-08 RX ADMIN — CARVEDILOL 25 MG: 25 TABLET, FILM COATED ORAL at 10:08

## 2018-06-08 RX ADMIN — POTASSIUM & SODIUM PHOSPHATES POWDER PACK 280-160-250 MG 250 MG: 280-160-250 PACK at 14:18

## 2018-06-08 RX ADMIN — ACETAMINOPHEN 650 MG: 325 TABLET ORAL at 10:09

## 2018-06-08 RX ADMIN — ASPIRIN 325 MG: 325 TABLET ORAL at 10:08

## 2018-06-08 RX ADMIN — GLYCOPYRROLATE 1 MG: 1 TABLET ORAL at 12:59

## 2018-06-08 RX ADMIN — GABAPENTIN 100 MG: 100 CAPSULE ORAL at 20:58

## 2018-06-08 RX ADMIN — POTASSIUM & SODIUM PHOSPHATES POWDER PACK 280-160-250 MG 250 MG: 280-160-250 PACK at 18:23

## 2018-06-08 RX ADMIN — PALIPERIDONE 3 MG: 3 TABLET, EXTENDED RELEASE ORAL at 10:00

## 2018-06-08 ASSESSMENT — PAIN SCALES - GENERAL
PAINLEVEL_OUTOF10: 10
PAINLEVEL_OUTOF10: 0
PAINLEVEL_OUTOF10: 10
PAINLEVEL_OUTOF10: 8

## 2018-06-09 PROCEDURE — 1240000000 HC EMOTIONAL WELLNESS R&B

## 2018-06-09 PROCEDURE — 6370000000 HC RX 637 (ALT 250 FOR IP): Performed by: PSYCHIATRY & NEUROLOGY

## 2018-06-09 PROCEDURE — 99231 SBSQ HOSP IP/OBS SF/LOW 25: CPT | Performed by: PSYCHIATRY & NEUROLOGY

## 2018-06-09 PROCEDURE — 94640 AIRWAY INHALATION TREATMENT: CPT

## 2018-06-09 RX ADMIN — LOSARTAN POTASSIUM 100 MG: 50 TABLET, FILM COATED ORAL at 08:18

## 2018-06-09 RX ADMIN — MULTIVITAMIN TABLET 1 TABLET: TABLET at 08:18

## 2018-06-09 RX ADMIN — ASPIRIN 325 MG: 325 TABLET ORAL at 08:18

## 2018-06-09 RX ADMIN — OXYCODONE HYDROCHLORIDE 5 MG: 5 TABLET ORAL at 15:36

## 2018-06-09 RX ADMIN — GLYCOPYRROLATE 1 MG: 1 TABLET ORAL at 13:32

## 2018-06-09 RX ADMIN — POTASSIUM & SODIUM PHOSPHATES POWDER PACK 280-160-250 MG 250 MG: 280-160-250 PACK at 13:32

## 2018-06-09 RX ADMIN — OXYCODONE HYDROCHLORIDE 5 MG: 5 TABLET ORAL at 08:18

## 2018-06-09 RX ADMIN — POTASSIUM & SODIUM PHOSPHATES POWDER PACK 280-160-250 MG 250 MG: 280-160-250 PACK at 08:18

## 2018-06-09 RX ADMIN — SERTRALINE 25 MG: 50 TABLET, FILM COATED ORAL at 08:18

## 2018-06-09 RX ADMIN — CARVEDILOL 25 MG: 25 TABLET, FILM COATED ORAL at 08:18

## 2018-06-09 RX ADMIN — OXYCODONE HYDROCHLORIDE 5 MG: 5 TABLET ORAL at 21:32

## 2018-06-09 RX ADMIN — IPRATROPIUM BROMIDE AND ALBUTEROL SULFATE 3 ML: 2.5; .5 SOLUTION RESPIRATORY (INHALATION) at 09:13

## 2018-06-09 RX ADMIN — POTASSIUM & SODIUM PHOSPHATES POWDER PACK 280-160-250 MG 250 MG: 280-160-250 PACK at 21:03

## 2018-06-09 RX ADMIN — VERAPAMIL HYDROCHLORIDE 240 MG: 240 TABLET, FILM COATED, EXTENDED RELEASE ORAL at 08:18

## 2018-06-09 RX ADMIN — ASPIRIN 325 MG: 325 TABLET ORAL at 18:27

## 2018-06-09 RX ADMIN — PALIPERIDONE 3 MG: 3 TABLET, EXTENDED RELEASE ORAL at 08:17

## 2018-06-09 RX ADMIN — PRAVASTATIN SODIUM 20 MG: 20 TABLET ORAL at 21:03

## 2018-06-09 RX ADMIN — GABAPENTIN 100 MG: 100 CAPSULE ORAL at 21:03

## 2018-06-09 RX ADMIN — GLYCOPYRROLATE 1 MG: 1 TABLET ORAL at 21:03

## 2018-06-09 RX ADMIN — GLYCOPYRROLATE 1 MG: 1 TABLET ORAL at 06:13

## 2018-06-09 RX ADMIN — PANTOPRAZOLE SODIUM 40 MG: 40 TABLET, DELAYED RELEASE ORAL at 06:13

## 2018-06-09 RX ADMIN — POTASSIUM & SODIUM PHOSPHATES POWDER PACK 280-160-250 MG 250 MG: 280-160-250 PACK at 18:27

## 2018-06-09 RX ADMIN — CARVEDILOL 25 MG: 25 TABLET, FILM COATED ORAL at 18:26

## 2018-06-09 RX ADMIN — OXYCODONE HYDROCHLORIDE 5 MG: 5 TABLET ORAL at 01:56

## 2018-06-09 ASSESSMENT — PAIN SCALES - GENERAL
PAINLEVEL_OUTOF10: 10
PAINLEVEL_OUTOF10: 10
PAINLEVEL_OUTOF10: 4
PAINLEVEL_OUTOF10: 4
PAINLEVEL_OUTOF10: 8
PAINLEVEL_OUTOF10: 6
PAINLEVEL_OUTOF10: 0

## 2018-06-10 PROCEDURE — 1240000000 HC EMOTIONAL WELLNESS R&B

## 2018-06-10 PROCEDURE — 99231 SBSQ HOSP IP/OBS SF/LOW 25: CPT | Performed by: PSYCHIATRY & NEUROLOGY

## 2018-06-10 PROCEDURE — 94640 AIRWAY INHALATION TREATMENT: CPT

## 2018-06-10 PROCEDURE — 6370000000 HC RX 637 (ALT 250 FOR IP): Performed by: PSYCHIATRY & NEUROLOGY

## 2018-06-10 RX ADMIN — ACETAMINOPHEN 650 MG: 325 TABLET ORAL at 23:45

## 2018-06-10 RX ADMIN — PRAVASTATIN SODIUM 20 MG: 20 TABLET ORAL at 20:31

## 2018-06-10 RX ADMIN — PALIPERIDONE 3 MG: 3 TABLET, EXTENDED RELEASE ORAL at 08:41

## 2018-06-10 RX ADMIN — POTASSIUM & SODIUM PHOSPHATES POWDER PACK 280-160-250 MG 250 MG: 280-160-250 PACK at 17:58

## 2018-06-10 RX ADMIN — GLYCOPYRROLATE 1 MG: 1 TABLET ORAL at 06:31

## 2018-06-10 RX ADMIN — POTASSIUM & SODIUM PHOSPHATES POWDER PACK 280-160-250 MG 250 MG: 280-160-250 PACK at 20:31

## 2018-06-10 RX ADMIN — PANTOPRAZOLE SODIUM 40 MG: 40 TABLET, DELAYED RELEASE ORAL at 06:31

## 2018-06-10 RX ADMIN — VERAPAMIL HYDROCHLORIDE 240 MG: 240 TABLET, FILM COATED, EXTENDED RELEASE ORAL at 08:41

## 2018-06-10 RX ADMIN — POTASSIUM & SODIUM PHOSPHATES POWDER PACK 280-160-250 MG 250 MG: 280-160-250 PACK at 08:41

## 2018-06-10 RX ADMIN — SERTRALINE 25 MG: 50 TABLET, FILM COATED ORAL at 08:41

## 2018-06-10 RX ADMIN — OXYCODONE HYDROCHLORIDE 5 MG: 5 TABLET ORAL at 06:31

## 2018-06-10 RX ADMIN — CARVEDILOL 25 MG: 25 TABLET, FILM COATED ORAL at 17:58

## 2018-06-10 RX ADMIN — MULTIVITAMIN TABLET 1 TABLET: TABLET at 08:41

## 2018-06-10 RX ADMIN — OXYCODONE HYDROCHLORIDE 5 MG: 5 TABLET ORAL at 18:36

## 2018-06-10 RX ADMIN — IPRATROPIUM BROMIDE AND ALBUTEROL SULFATE 3 ML: 2.5; .5 SOLUTION RESPIRATORY (INHALATION) at 17:30

## 2018-06-10 RX ADMIN — GLYCOPYRROLATE 1 MG: 1 TABLET ORAL at 13:32

## 2018-06-10 RX ADMIN — ASPIRIN 325 MG: 325 TABLET ORAL at 17:58

## 2018-06-10 RX ADMIN — CARVEDILOL 25 MG: 25 TABLET, FILM COATED ORAL at 08:41

## 2018-06-10 RX ADMIN — GABAPENTIN 100 MG: 100 CAPSULE ORAL at 20:31

## 2018-06-10 RX ADMIN — OXYCODONE HYDROCHLORIDE 5 MG: 5 TABLET ORAL at 12:36

## 2018-06-10 RX ADMIN — ASPIRIN 325 MG: 325 TABLET ORAL at 08:41

## 2018-06-10 RX ADMIN — POTASSIUM & SODIUM PHOSPHATES POWDER PACK 280-160-250 MG 250 MG: 280-160-250 PACK at 13:31

## 2018-06-10 RX ADMIN — DIPHENHYDRAMINE HCL 25 MG: 25 TABLET ORAL at 20:40

## 2018-06-10 RX ADMIN — LOSARTAN POTASSIUM 100 MG: 50 TABLET, FILM COATED ORAL at 08:41

## 2018-06-10 RX ADMIN — GLYCOPYRROLATE 1 MG: 1 TABLET ORAL at 21:22

## 2018-06-10 ASSESSMENT — PAIN SCALES - GENERAL
PAINLEVEL_OUTOF10: 2
PAINLEVEL_OUTOF10: 10
PAINLEVEL_OUTOF10: 2
PAINLEVEL_OUTOF10: 0
PAINLEVEL_OUTOF10: 8
PAINLEVEL_OUTOF10: 10
PAINLEVEL_OUTOF10: 7
PAINLEVEL_OUTOF10: 8

## 2018-06-11 PROCEDURE — 6370000000 HC RX 637 (ALT 250 FOR IP): Performed by: INTERNAL MEDICINE

## 2018-06-11 PROCEDURE — 1240000000 HC EMOTIONAL WELLNESS R&B

## 2018-06-11 PROCEDURE — 99231 SBSQ HOSP IP/OBS SF/LOW 25: CPT | Performed by: PSYCHIATRY & NEUROLOGY

## 2018-06-11 PROCEDURE — 6370000000 HC RX 637 (ALT 250 FOR IP): Performed by: PSYCHIATRY & NEUROLOGY

## 2018-06-11 RX ADMIN — PALIPERIDONE 3 MG: 3 TABLET, EXTENDED RELEASE ORAL at 12:53

## 2018-06-11 RX ADMIN — LOSARTAN POTASSIUM 100 MG: 50 TABLET, FILM COATED ORAL at 10:15

## 2018-06-11 RX ADMIN — POTASSIUM & SODIUM PHOSPHATES POWDER PACK 280-160-250 MG 250 MG: 280-160-250 PACK at 21:15

## 2018-06-11 RX ADMIN — PANTOPRAZOLE SODIUM 40 MG: 40 TABLET, DELAYED RELEASE ORAL at 06:32

## 2018-06-11 RX ADMIN — OXYCODONE HYDROCHLORIDE 5 MG: 5 TABLET ORAL at 18:12

## 2018-06-11 RX ADMIN — VERAPAMIL HYDROCHLORIDE 240 MG: 240 TABLET, FILM COATED, EXTENDED RELEASE ORAL at 10:15

## 2018-06-11 RX ADMIN — GABAPENTIN 100 MG: 100 CAPSULE ORAL at 21:15

## 2018-06-11 RX ADMIN — POTASSIUM & SODIUM PHOSPHATES POWDER PACK 280-160-250 MG 250 MG: 280-160-250 PACK at 17:57

## 2018-06-11 RX ADMIN — CARVEDILOL 25 MG: 25 TABLET, FILM COATED ORAL at 10:15

## 2018-06-11 RX ADMIN — GLYCOPYRROLATE 1 MG: 1 TABLET ORAL at 15:11

## 2018-06-11 RX ADMIN — ACETAMINOPHEN 650 MG: 325 TABLET ORAL at 10:14

## 2018-06-11 RX ADMIN — CARVEDILOL 25 MG: 25 TABLET, FILM COATED ORAL at 17:56

## 2018-06-11 RX ADMIN — GLYCOPYRROLATE 1 MG: 1 TABLET ORAL at 21:15

## 2018-06-11 RX ADMIN — PRAVASTATIN SODIUM 20 MG: 20 TABLET ORAL at 21:15

## 2018-06-11 RX ADMIN — OXYCODONE HYDROCHLORIDE 5 MG: 5 TABLET ORAL at 11:45

## 2018-06-11 RX ADMIN — GLYCOPYRROLATE 1 MG: 1 TABLET ORAL at 06:32

## 2018-06-11 RX ADMIN — CARBAMIDE PEROXIDE 6.5% OTIC SOLN 5 DROP: 6.5 SOLUTION at 17:56

## 2018-06-11 RX ADMIN — MULTIVITAMIN TABLET 1 TABLET: TABLET at 10:15

## 2018-06-11 RX ADMIN — ACETAMINOPHEN 650 MG: 325 TABLET ORAL at 15:11

## 2018-06-11 RX ADMIN — OXYCODONE HYDROCHLORIDE 5 MG: 5 TABLET ORAL at 05:45

## 2018-06-11 RX ADMIN — ASPIRIN 325 MG: 325 TABLET ORAL at 10:15

## 2018-06-11 RX ADMIN — SERTRALINE 25 MG: 50 TABLET, FILM COATED ORAL at 10:15

## 2018-06-11 RX ADMIN — POTASSIUM & SODIUM PHOSPHATES POWDER PACK 280-160-250 MG 250 MG: 280-160-250 PACK at 09:00

## 2018-06-11 RX ADMIN — ASPIRIN 325 MG: 325 TABLET ORAL at 17:57

## 2018-06-11 RX ADMIN — POTASSIUM & SODIUM PHOSPHATES POWDER PACK 280-160-250 MG 250 MG: 280-160-250 PACK at 13:22

## 2018-06-11 ASSESSMENT — PAIN SCALES - GENERAL
PAINLEVEL_OUTOF10: 8
PAINLEVEL_OUTOF10: 10
PAINLEVEL_OUTOF10: 9
PAINLEVEL_OUTOF10: 8
PAINLEVEL_OUTOF10: 7
PAINLEVEL_OUTOF10: 4

## 2018-06-12 VITALS
OXYGEN SATURATION: 96 % | DIASTOLIC BLOOD PRESSURE: 70 MMHG | HEIGHT: 71 IN | TEMPERATURE: 98.8 F | RESPIRATION RATE: 20 BRPM | WEIGHT: 190 LBS | BODY MASS INDEX: 26.6 KG/M2 | HEART RATE: 70 BPM | SYSTOLIC BLOOD PRESSURE: 131 MMHG

## 2018-06-12 PROCEDURE — G8979 MOBILITY GOAL STATUS: HCPCS

## 2018-06-12 PROCEDURE — 6370000000 HC RX 637 (ALT 250 FOR IP): Performed by: PSYCHIATRY & NEUROLOGY

## 2018-06-12 PROCEDURE — 99238 HOSP IP/OBS DSCHRG MGMT 30/<: CPT | Performed by: PSYCHIATRY & NEUROLOGY

## 2018-06-12 PROCEDURE — 97161 PT EVAL LOW COMPLEX 20 MIN: CPT

## 2018-06-12 PROCEDURE — G8978 MOBILITY CURRENT STATUS: HCPCS

## 2018-06-12 PROCEDURE — 94640 AIRWAY INHALATION TREATMENT: CPT

## 2018-06-12 PROCEDURE — 97530 THERAPEUTIC ACTIVITIES: CPT

## 2018-06-12 RX ORDER — SERTRALINE HYDROCHLORIDE 25 MG/1
25 TABLET, FILM COATED ORAL DAILY
Qty: 30 TABLET | Refills: 0 | Status: ON HOLD | OUTPATIENT
Start: 2018-06-13 | End: 2020-12-13

## 2018-06-12 RX ORDER — PALIPERIDONE 3 MG/1
3 TABLET, EXTENDED RELEASE ORAL DAILY
Qty: 30 TABLET | Refills: 0 | Status: ON HOLD | OUTPATIENT
Start: 2018-06-13 | End: 2020-12-13

## 2018-06-12 RX ADMIN — SERTRALINE 25 MG: 50 TABLET, FILM COATED ORAL at 08:44

## 2018-06-12 RX ADMIN — POTASSIUM & SODIUM PHOSPHATES POWDER PACK 280-160-250 MG 250 MG: 280-160-250 PACK at 13:16

## 2018-06-12 RX ADMIN — OXYCODONE HYDROCHLORIDE 5 MG: 5 TABLET ORAL at 13:22

## 2018-06-12 RX ADMIN — OXYCODONE HYDROCHLORIDE 5 MG: 5 TABLET ORAL at 07:01

## 2018-06-12 RX ADMIN — PALIPERIDONE 3 MG: 3 TABLET, EXTENDED RELEASE ORAL at 08:45

## 2018-06-12 RX ADMIN — POTASSIUM & SODIUM PHOSPHATES POWDER PACK 280-160-250 MG 250 MG: 280-160-250 PACK at 08:45

## 2018-06-12 RX ADMIN — OXYCODONE HYDROCHLORIDE 5 MG: 5 TABLET ORAL at 00:34

## 2018-06-12 RX ADMIN — PANTOPRAZOLE SODIUM 40 MG: 40 TABLET, DELAYED RELEASE ORAL at 06:33

## 2018-06-12 RX ADMIN — VERAPAMIL HYDROCHLORIDE 240 MG: 240 TABLET, FILM COATED, EXTENDED RELEASE ORAL at 08:43

## 2018-06-12 RX ADMIN — LOSARTAN POTASSIUM 100 MG: 50 TABLET, FILM COATED ORAL at 08:43

## 2018-06-12 RX ADMIN — CARVEDILOL 25 MG: 25 TABLET, FILM COATED ORAL at 08:43

## 2018-06-12 RX ADMIN — ASPIRIN 325 MG: 325 TABLET ORAL at 08:43

## 2018-06-12 RX ADMIN — MULTIVITAMIN TABLET 1 TABLET: TABLET at 08:45

## 2018-06-12 RX ADMIN — GLYCOPYRROLATE 1 MG: 1 TABLET ORAL at 13:15

## 2018-06-12 RX ADMIN — IPRATROPIUM BROMIDE AND ALBUTEROL SULFATE 3 ML: 2.5; .5 SOLUTION RESPIRATORY (INHALATION) at 10:43

## 2018-06-12 ASSESSMENT — PAIN DESCRIPTION - LOCATION: LOCATION: LEG

## 2018-06-12 ASSESSMENT — PAIN DESCRIPTION - DESCRIPTORS: DESCRIPTORS: ACHING

## 2018-06-12 ASSESSMENT — PAIN DESCRIPTION - PAIN TYPE: TYPE: ACUTE PAIN

## 2018-06-12 ASSESSMENT — PAIN SCALES - GENERAL
PAINLEVEL_OUTOF10: 0
PAINLEVEL_OUTOF10: 10
PAINLEVEL_OUTOF10: 10
PAINLEVEL_OUTOF10: 0
PAINLEVEL_OUTOF10: 10

## 2018-06-12 ASSESSMENT — PAIN DESCRIPTION - ORIENTATION: ORIENTATION: RIGHT;LEFT

## 2018-06-15 ENCOUNTER — TELEPHONE (OUTPATIENT)
Dept: SURGERY | Age: 53
End: 2018-06-15

## 2018-06-19 ENCOUNTER — OFFICE VISIT (OUTPATIENT)
Dept: SURGERY | Age: 53
End: 2018-06-19
Payer: MEDICARE

## 2018-06-19 VITALS
OXYGEN SATURATION: 96 % | RESPIRATION RATE: 16 BRPM | WEIGHT: 201 LBS | BODY MASS INDEX: 28.14 KG/M2 | DIASTOLIC BLOOD PRESSURE: 68 MMHG | SYSTOLIC BLOOD PRESSURE: 134 MMHG | HEART RATE: 56 BPM | HEIGHT: 71 IN | TEMPERATURE: 98.1 F

## 2018-06-19 DIAGNOSIS — Z43.0 TRACHEOSTOMY CARE (HCC): Primary | ICD-10-CM

## 2018-06-19 PROCEDURE — G8427 DOCREV CUR MEDS BY ELIG CLIN: HCPCS | Performed by: SURGERY

## 2018-06-19 PROCEDURE — 99211 OFF/OP EST MAY X REQ PHY/QHP: CPT | Performed by: SURGERY

## 2018-06-19 PROCEDURE — G8419 CALC BMI OUT NRM PARAM NOF/U: HCPCS | Performed by: SURGERY

## 2018-06-19 PROCEDURE — 3017F COLORECTAL CA SCREEN DOC REV: CPT | Performed by: SURGERY

## 2018-07-10 ENCOUNTER — TELEPHONE (OUTPATIENT)
Dept: ADMINISTRATIVE | Age: 53
End: 2018-07-10

## 2018-07-10 NOTE — TELEPHONE ENCOUNTER
Pt has a 10 am appt on 7/18 with Dr Lee Linares. Cindy/Bro at [facility] called to ask if patient's appt can be changed to either 8 am, 12pm, 1pm or 4pm on 7/18. Change is needed due to transportation arrangement. If this can be changed, please call Cindy back at [phone]. Thank you.

## 2018-07-13 ENCOUNTER — TELEPHONE (OUTPATIENT)
Dept: SURGERY | Age: 53
End: 2018-07-13

## 2018-07-13 ENCOUNTER — TELEPHONE (OUTPATIENT)
Dept: ADMINISTRATIVE | Age: 53
End: 2018-07-13

## 2018-07-16 DIAGNOSIS — S82.142D CLOSED DISPLACED BICONDYLAR FRACTURE OF LEFT TIBIA WITH ROUTINE HEALING: ICD-10-CM

## 2018-07-16 DIAGNOSIS — S42.001A CLOSED DISPLACED FRACTURE OF RIGHT CLAVICLE, UNSPECIFIED PART OF CLAVICLE, INITIAL ENCOUNTER: ICD-10-CM

## 2018-07-16 DIAGNOSIS — S92.001A CLOSED DISPLACED FRACTURE OF RIGHT CALCANEUS, UNSPECIFIED PORTION OF CALCANEUS, INITIAL ENCOUNTER: Primary | ICD-10-CM

## 2018-07-18 ENCOUNTER — HOSPITAL ENCOUNTER (OUTPATIENT)
Dept: GENERAL RADIOLOGY | Age: 53
Discharge: HOME | End: 2018-07-20
Payer: MEDICARE

## 2018-07-18 ENCOUNTER — OFFICE VISIT (OUTPATIENT)
Dept: ORTHOPEDIC SURGERY | Age: 53
End: 2018-07-18
Payer: MEDICARE

## 2018-07-18 VITALS — OXYGEN SATURATION: 97 % | BODY MASS INDEX: 29.12 KG/M2 | WEIGHT: 208 LBS | HEART RATE: 78 BPM | HEIGHT: 71 IN

## 2018-07-18 DIAGNOSIS — S92.001A CLOSED DISPLACED FRACTURE OF RIGHT CALCANEUS, UNSPECIFIED PORTION OF CALCANEUS, INITIAL ENCOUNTER: ICD-10-CM

## 2018-07-18 DIAGNOSIS — S42.001A CLOSED DISPLACED FRACTURE OF RIGHT CLAVICLE, UNSPECIFIED PART OF CLAVICLE, INITIAL ENCOUNTER: ICD-10-CM

## 2018-07-18 DIAGNOSIS — S82.142D CLOSED DISPLACED BICONDYLAR FRACTURE OF LEFT TIBIA WITH ROUTINE HEALING: Primary | ICD-10-CM

## 2018-07-18 DIAGNOSIS — S82.142D CLOSED DISPLACED BICONDYLAR FRACTURE OF LEFT TIBIA WITH ROUTINE HEALING: ICD-10-CM

## 2018-07-18 DIAGNOSIS — S92.001S CLOSED DISPLACED FRACTURE OF RIGHT CALCANEUS, UNSPECIFIED PORTION OF CALCANEUS, SEQUELA: ICD-10-CM

## 2018-07-18 PROCEDURE — 99212 OFFICE O/P EST SF 10 MIN: CPT

## 2018-07-18 PROCEDURE — 73560 X-RAY EXAM OF KNEE 1 OR 2: CPT

## 2018-07-18 PROCEDURE — 73650 X-RAY EXAM OF HEEL: CPT

## 2018-07-18 PROCEDURE — 73000 X-RAY EXAM OF COLLAR BONE: CPT

## 2018-07-18 PROCEDURE — 99024 POSTOP FOLLOW-UP VISIT: CPT | Performed by: ORTHOPAEDIC SURGERY

## 2018-07-18 NOTE — PROGRESS NOTES
degrees of flexion and extension     Compartments soft and compressible. NVID. 2/4 DP and PT pulses. Patella tracks nicely    Calves soft and NT.     5/5 EHL, TA, GS. Active motion is -° with force he is -°        X-rayclavicle shows good healing of fracture with hardware in good position. Calcaneus also shows good healing with hardware in good position. Tibia shows some varus collapse although this is been going on for some time he does show callus formation. There is no obvious broken hardware. Discussion: I talked about the fact we can start making him weightbearing as tolerated with a walker for assistance. He is happy to hear this news. I thoroughly is work aggressively as the range of motion he agrees. We'll see him back in 6 weeks' time to see if he is improving with his range of motion left knee get left knee films as well.         Impression: Polytrauma currently still actively following his left bicondylar tibial plateau fracture    Weight-bear as tolerated with walker  Follow up in 6 weeks with x-rays left knee  Call sooner with questions or concerns

## 2018-07-18 NOTE — PATIENT INSTRUCTIONS
Patient may be weightbearing on all 4 extremities as tolerated with a walker. Physical therapywork on weightbearing as tolerated with walker for assistance. Needs aggressive therapy on left knee for extension. Modalities as needed. Follow-up in 6 weeks here for x-rays of left knee. Specific time and they will be seen on the after visit summary. Call with any questions or concerns.

## 2018-07-20 ENCOUNTER — TELEPHONE (OUTPATIENT)
Dept: ORTHOPEDIC SURGERY | Age: 53
End: 2018-07-20

## 2018-07-26 ENCOUNTER — OFFICE VISIT (OUTPATIENT)
Dept: SURGERY | Age: 53
End: 2018-07-26
Payer: MEDICARE

## 2018-07-26 VITALS
WEIGHT: 208 LBS | RESPIRATION RATE: 18 BRPM | BODY MASS INDEX: 29.12 KG/M2 | HEART RATE: 60 BPM | OXYGEN SATURATION: 97 % | HEIGHT: 71 IN | SYSTOLIC BLOOD PRESSURE: 118 MMHG | DIASTOLIC BLOOD PRESSURE: 64 MMHG | TEMPERATURE: 98 F

## 2018-07-26 DIAGNOSIS — J95.09 TRACHEOSTOMY GRANULOMA (HCC): Primary | ICD-10-CM

## 2018-07-26 PROCEDURE — G8427 DOCREV CUR MEDS BY ELIG CLIN: HCPCS | Performed by: SURGERY

## 2018-07-26 PROCEDURE — G8419 CALC BMI OUT NRM PARAM NOF/U: HCPCS | Performed by: SURGERY

## 2018-07-26 PROCEDURE — 3017F COLORECTAL CA SCREEN DOC REV: CPT | Performed by: SURGERY

## 2018-07-26 PROCEDURE — 99212 OFFICE O/P EST SF 10 MIN: CPT | Performed by: SURGERY

## 2018-07-26 PROCEDURE — 1036F TOBACCO NON-USER: CPT | Performed by: SURGERY

## 2018-08-29 ENCOUNTER — HOSPITAL ENCOUNTER (OUTPATIENT)
Dept: GENERAL RADIOLOGY | Age: 53
Discharge: HOME | End: 2018-08-31
Payer: MEDICARE

## 2018-08-29 ENCOUNTER — OFFICE VISIT (OUTPATIENT)
Dept: ORTHOPEDIC SURGERY | Age: 53
End: 2018-08-29
Payer: MEDICARE

## 2018-08-29 VITALS
BODY MASS INDEX: 30.8 KG/M2 | SYSTOLIC BLOOD PRESSURE: 109 MMHG | HEART RATE: 56 BPM | HEIGHT: 71 IN | WEIGHT: 220 LBS | DIASTOLIC BLOOD PRESSURE: 65 MMHG

## 2018-08-29 DIAGNOSIS — S82.142D CLOSED DISPLACED BICONDYLAR FRACTURE OF LEFT TIBIA WITH ROUTINE HEALING: Primary | ICD-10-CM

## 2018-08-29 DIAGNOSIS — S82.142D CLOSED DISPLACED BICONDYLAR FRACTURE OF LEFT TIBIA WITH ROUTINE HEALING: ICD-10-CM

## 2018-08-29 PROCEDURE — 3017F COLORECTAL CA SCREEN DOC REV: CPT | Performed by: ORTHOPAEDIC SURGERY

## 2018-08-29 PROCEDURE — G8417 CALC BMI ABV UP PARAM F/U: HCPCS | Performed by: ORTHOPAEDIC SURGERY

## 2018-08-29 PROCEDURE — 1036F TOBACCO NON-USER: CPT | Performed by: ORTHOPAEDIC SURGERY

## 2018-08-29 PROCEDURE — 73560 X-RAY EXAM OF KNEE 1 OR 2: CPT

## 2018-08-29 PROCEDURE — G8427 DOCREV CUR MEDS BY ELIG CLIN: HCPCS | Performed by: ORTHOPAEDIC SURGERY

## 2018-08-29 PROCEDURE — 99213 OFFICE O/P EST LOW 20 MIN: CPT | Performed by: ORTHOPAEDIC SURGERY

## 2018-08-29 PROCEDURE — 99212 OFFICE O/P EST SF 10 MIN: CPT

## 2018-08-29 NOTE — PATIENT INSTRUCTIONS
Impression: Polytrauma with issues with his left bicondylar tibial plateau fracture      CT scan left knee without contrast, 3 mm cuts with 3-D reconstruction    Follow-up after CT is done      Call sooner if questions or concerns

## 2018-09-05 ENCOUNTER — HOSPITAL ENCOUNTER (OUTPATIENT)
Dept: CT IMAGING | Age: 53
Discharge: HOME | End: 2018-09-07
Payer: MEDICARE

## 2018-09-05 DIAGNOSIS — S82.142D CLOSED DISPLACED BICONDYLAR FRACTURE OF LEFT TIBIA WITH ROUTINE HEALING: ICD-10-CM

## 2018-09-05 DIAGNOSIS — S82.145D CLOSED NONDISPLACED BICONDYLAR FRACTURE OF LEFT TIBIA WITH ROUTINE HEALING, SUBSEQUENT ENCOUNTER: ICD-10-CM

## 2018-09-05 PROCEDURE — 76376 3D RENDER W/INTRP POSTPROCES: CPT

## 2018-09-05 PROCEDURE — 73700 CT LOWER EXTREMITY W/O DYE: CPT

## 2018-09-26 ENCOUNTER — OFFICE VISIT (OUTPATIENT)
Dept: ORTHOPEDIC SURGERY | Age: 53
End: 2018-09-26
Payer: MEDICARE

## 2018-09-26 ENCOUNTER — TELEPHONE (OUTPATIENT)
Dept: ADMINISTRATIVE | Age: 53
End: 2018-09-26

## 2018-09-26 VITALS
HEART RATE: 55 BPM | HEIGHT: 71 IN | SYSTOLIC BLOOD PRESSURE: 109 MMHG | DIASTOLIC BLOOD PRESSURE: 66 MMHG | WEIGHT: 220 LBS | RESPIRATION RATE: 18 BRPM | BODY MASS INDEX: 30.8 KG/M2

## 2018-09-26 DIAGNOSIS — S82.142D CLOSED DISPLACED BICONDYLAR FRACTURE OF LEFT TIBIA WITH ROUTINE HEALING: Primary | ICD-10-CM

## 2018-09-26 PROCEDURE — G8417 CALC BMI ABV UP PARAM F/U: HCPCS | Performed by: ORTHOPAEDIC SURGERY

## 2018-09-26 PROCEDURE — G8427 DOCREV CUR MEDS BY ELIG CLIN: HCPCS | Performed by: ORTHOPAEDIC SURGERY

## 2018-09-26 PROCEDURE — 99212 OFFICE O/P EST SF 10 MIN: CPT

## 2018-09-26 PROCEDURE — 99214 OFFICE O/P EST MOD 30 MIN: CPT | Performed by: ORTHOPAEDIC SURGERY

## 2018-09-26 PROCEDURE — 1036F TOBACCO NON-USER: CPT | Performed by: ORTHOPAEDIC SURGERY

## 2018-09-26 PROCEDURE — 3017F COLORECTAL CA SCREEN DOC REV: CPT | Performed by: ORTHOPAEDIC SURGERY

## 2018-09-26 RX ORDER — AMOXICILLIN 500 MG/1
500 CAPSULE ORAL 3 TIMES DAILY
COMMUNITY
End: 2020-12-13

## 2018-09-26 ASSESSMENT — ENCOUNTER SYMPTOMS
BLOOD IN STOOL: 0
SINUS PRESSURE: 1
WHEEZING: 0
DIARRHEA: 0
VOMITING: 0
SORE THROAT: 0
COUGH: 0
SHORTNESS OF BREATH: 0
EYE PAIN: 0
BACK PAIN: 1
EYE ITCHING: 0
NAUSEA: 0
EYE DISCHARGE: 0
CONSTIPATION: 1
EYE REDNESS: 0
ABDOMINAL PAIN: 0

## 2018-09-26 NOTE — PATIENT INSTRUCTIONS
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 Sheryle Ripper' surgery is scheduled for repair nonunion of the left knee with manipulation and ORIF of the medial proximal tibia at the [facility] in [facility] . Office will call with date and time  2. Preadmission Testing (PAT) department at [facility] will contact you with all the details prior to surgery. 3.  Nothing to eat or drink after midnight the night before surgery. They may take a pain pill and any other medicine PAT instructs you to take with small sip of water if needed. 4. Hold Aspirin the day before and the day of surgery   5. Patient is having outpatient surgery so they will be returning back to your facility the same day     ORDERS  · Can continue with WBAT left leg  · Take pain medicine as instructed  · Continue with ASA as ordered except for day of surgery  · Patient will need medical clearance prior to surgery. Please fax to [facility] at [phone]  · Call office with any question or concerns: [phone]    MUST DRAW LABs: SR AND CRP THIS WEEK AND FAX TO OFFICE -to check for infection prior to surgery. PAT may require other labs as well   .

## 2018-09-26 NOTE — TELEPHONE ENCOUNTER
Patient called and same day cancelled his appt for this afternoon because of transportation. He was unable to reschedule at this time.
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
## 2018-12-06 ENCOUNTER — TELEPHONE (OUTPATIENT)
Dept: ORTHOPEDIC SURGERY | Age: 53
End: 2018-12-06

## 2020-12-13 ENCOUNTER — HOSPITAL ENCOUNTER (INPATIENT)
Age: 55
LOS: 2 days | Discharge: HOME | DRG: 247 | End: 2020-12-15
Attending: EMERGENCY MEDICINE | Admitting: INTERNAL MEDICINE
Payer: COMMERCIAL

## 2020-12-13 ENCOUNTER — APPOINTMENT (OUTPATIENT)
Dept: CT IMAGING | Age: 55
DRG: 247 | End: 2020-12-13
Payer: COMMERCIAL

## 2020-12-13 ENCOUNTER — APPOINTMENT (OUTPATIENT)
Dept: GENERAL RADIOLOGY | Age: 55
DRG: 247 | End: 2020-12-13
Payer: COMMERCIAL

## 2020-12-13 PROBLEM — I21.4 NSTEMI (NON-ST ELEVATED MYOCARDIAL INFARCTION) (HCC): Status: ACTIVE | Noted: 2020-12-13

## 2020-12-13 LAB
ALBUMIN SERPL-MCNC: 4.3 G/DL (ref 3.5–5.2)
ALP BLD-CCNC: 130 U/L (ref 40–129)
ALT SERPL-CCNC: 77 U/L (ref 0–40)
ANION GAP SERPL CALCULATED.3IONS-SCNC: 10 MMOL/L (ref 7–16)
APTT: 145.1 SEC (ref 24.5–35.1)
AST SERPL-CCNC: 52 U/L (ref 0–39)
BASOPHILS ABSOLUTE: 0.06 E9/L (ref 0–0.2)
BASOPHILS RELATIVE PERCENT: 0.8 % (ref 0–2)
BILIRUB SERPL-MCNC: 0.3 MG/DL (ref 0–1.2)
BUN BLDV-MCNC: 13 MG/DL (ref 6–20)
CALCIUM SERPL-MCNC: 9.7 MG/DL (ref 8.6–10.2)
CHLORIDE BLD-SCNC: 101 MMOL/L (ref 98–107)
CK MB: 44.6 NG/ML (ref 0–7.7)
CO2: 26 MMOL/L (ref 22–29)
CREAT SERPL-MCNC: 0.8 MG/DL (ref 0.7–1.2)
D DIMER: 3404 NG/ML DDU
EKG ATRIAL RATE: 63 BPM
EKG ATRIAL RATE: 68 BPM
EKG P AXIS: 48 DEGREES
EKG P AXIS: 49 DEGREES
EKG P-R INTERVAL: 194 MS
EKG P-R INTERVAL: 194 MS
EKG Q-T INTERVAL: 404 MS
EKG Q-T INTERVAL: 428 MS
EKG QRS DURATION: 106 MS
EKG QRS DURATION: 110 MS
EKG QTC CALCULATION (BAZETT): 429 MS
EKG QTC CALCULATION (BAZETT): 437 MS
EKG R AXIS: 44 DEGREES
EKG R AXIS: 46 DEGREES
EKG T AXIS: 29 DEGREES
EKG T AXIS: 32 DEGREES
EKG VENTRICULAR RATE: 63 BPM
EKG VENTRICULAR RATE: 68 BPM
EOSINOPHILS ABSOLUTE: 0.17 E9/L (ref 0.05–0.5)
EOSINOPHILS RELATIVE PERCENT: 2.2 % (ref 0–6)
GFR AFRICAN AMERICAN: >60
GFR NON-AFRICAN AMERICAN: >60 ML/MIN/1.73
GLUCOSE BLD-MCNC: 154 MG/DL (ref 74–99)
HCT VFR BLD CALC: 45.5 % (ref 37–54)
HCT VFR BLD CALC: 45.9 % (ref 37–54)
HEMOGLOBIN: 15.5 G/DL (ref 12.5–16.5)
HEMOGLOBIN: 15.7 G/DL (ref 12.5–16.5)
IMMATURE GRANULOCYTES #: 0.04 E9/L
IMMATURE GRANULOCYTES %: 0.5 % (ref 0–5)
LACTIC ACID: 1 MMOL/L (ref 0.5–2.2)
LIPASE: 36 U/L (ref 13–60)
LYMPHOCYTES ABSOLUTE: 1.88 E9/L (ref 1.5–4)
LYMPHOCYTES RELATIVE PERCENT: 23.9 % (ref 20–42)
MCH RBC QN AUTO: 30.9 PG (ref 26–35)
MCH RBC QN AUTO: 31.2 PG (ref 26–35)
MCHC RBC AUTO-ENTMCNC: 34.1 % (ref 32–34.5)
MCHC RBC AUTO-ENTMCNC: 34.2 % (ref 32–34.5)
MCV RBC AUTO: 90.8 FL (ref 80–99.9)
MCV RBC AUTO: 91.1 FL (ref 80–99.9)
MONOCYTES ABSOLUTE: 0.67 E9/L (ref 0.1–0.95)
MONOCYTES RELATIVE PERCENT: 8.5 % (ref 2–12)
NEUTROPHILS ABSOLUTE: 5.06 E9/L (ref 1.8–7.3)
NEUTROPHILS RELATIVE PERCENT: 64.1 % (ref 43–80)
PDW BLD-RTO: 13.6 FL (ref 11.5–15)
PDW BLD-RTO: 13.6 FL (ref 11.5–15)
PLATELET # BLD: 203 E9/L (ref 130–450)
PLATELET # BLD: 205 E9/L (ref 130–450)
PMV BLD AUTO: 10.1 FL (ref 7–12)
PMV BLD AUTO: 9.7 FL (ref 7–12)
POTASSIUM SERPL-SCNC: 4.6 MMOL/L (ref 3.5–5)
RBC # BLD: 5.01 E12/L (ref 3.8–5.8)
RBC # BLD: 5.04 E12/L (ref 3.8–5.8)
REASON FOR REJECTION: NORMAL
REASON FOR REJECTION: NORMAL
REJECTED TEST: NORMAL
REJECTED TEST: NORMAL
SODIUM BLD-SCNC: 137 MMOL/L (ref 132–146)
TOTAL CK: 360 U/L (ref 20–200)
TOTAL PROTEIN: 7.6 G/DL (ref 6.4–8.3)
TROPONIN: 0.09 NG/ML (ref 0–0.03)
TROPONIN: 0.09 NG/ML (ref 0–0.03)
TROPONIN: 0.34 NG/ML (ref 0–0.03)
WBC # BLD: 7.6 E9/L (ref 4.5–11.5)
WBC # BLD: 7.9 E9/L (ref 4.5–11.5)

## 2020-12-13 PROCEDURE — 84484 ASSAY OF TROPONIN QUANT: CPT

## 2020-12-13 PROCEDURE — 2500000003 HC RX 250 WO HCPCS: Performed by: EMERGENCY MEDICINE

## 2020-12-13 PROCEDURE — 6370000000 HC RX 637 (ALT 250 FOR IP): Performed by: INTERNAL MEDICINE

## 2020-12-13 PROCEDURE — 36415 COLL VENOUS BLD VENIPUNCTURE: CPT

## 2020-12-13 PROCEDURE — 80053 COMPREHEN METABOLIC PANEL: CPT

## 2020-12-13 PROCEDURE — 6360000002 HC RX W HCPCS: Performed by: STUDENT IN AN ORGANIZED HEALTH CARE EDUCATION/TRAINING PROGRAM

## 2020-12-13 PROCEDURE — 96374 THER/PROPH/DIAG INJ IV PUSH: CPT

## 2020-12-13 PROCEDURE — 85027 COMPLETE CBC AUTOMATED: CPT

## 2020-12-13 PROCEDURE — 82550 ASSAY OF CK (CPK): CPT

## 2020-12-13 PROCEDURE — 85730 THROMBOPLASTIN TIME PARTIAL: CPT

## 2020-12-13 PROCEDURE — 2140000000 HC CCU INTERMEDIATE R&B

## 2020-12-13 PROCEDURE — 2580000003 HC RX 258: Performed by: INTERNAL MEDICINE

## 2020-12-13 PROCEDURE — 99285 EMERGENCY DEPT VISIT HI MDM: CPT

## 2020-12-13 PROCEDURE — 6370000000 HC RX 637 (ALT 250 FOR IP): Performed by: EMERGENCY MEDICINE

## 2020-12-13 PROCEDURE — 71275 CT ANGIOGRAPHY CHEST: CPT

## 2020-12-13 PROCEDURE — 6360000004 HC RX CONTRAST MEDICATION: Performed by: RADIOLOGY

## 2020-12-13 PROCEDURE — 74174 CTA ABD&PLVS W/CONTRAST: CPT

## 2020-12-13 PROCEDURE — 6360000002 HC RX W HCPCS: Performed by: EMERGENCY MEDICINE

## 2020-12-13 PROCEDURE — 85025 COMPLETE CBC W/AUTO DIFF WBC: CPT

## 2020-12-13 PROCEDURE — 85378 FIBRIN DEGRADE SEMIQUANT: CPT

## 2020-12-13 PROCEDURE — 83605 ASSAY OF LACTIC ACID: CPT

## 2020-12-13 PROCEDURE — 96375 TX/PRO/DX INJ NEW DRUG ADDON: CPT

## 2020-12-13 PROCEDURE — 83690 ASSAY OF LIPASE: CPT

## 2020-12-13 PROCEDURE — 93010 ELECTROCARDIOGRAM REPORT: CPT | Performed by: INTERNAL MEDICINE

## 2020-12-13 PROCEDURE — 71045 X-RAY EXAM CHEST 1 VIEW: CPT

## 2020-12-13 PROCEDURE — 93005 ELECTROCARDIOGRAM TRACING: CPT | Performed by: EMERGENCY MEDICINE

## 2020-12-13 PROCEDURE — 82553 CREATINE MB FRACTION: CPT

## 2020-12-13 RX ORDER — MIRTAZAPINE 15 MG/1
30 TABLET, FILM COATED ORAL NIGHTLY
Status: DISCONTINUED | OUTPATIENT
Start: 2020-12-13 | End: 2020-12-15 | Stop reason: HOSPADM

## 2020-12-13 RX ORDER — BUPROPION HYDROCHLORIDE 75 MG/1
150 TABLET ORAL 2 TIMES DAILY
COMMUNITY

## 2020-12-13 RX ORDER — ASPIRIN 325 MG
325 TABLET ORAL DAILY
Status: DISCONTINUED | OUTPATIENT
Start: 2020-12-14 | End: 2020-12-14

## 2020-12-13 RX ORDER — HEPARIN SODIUM 1000 [USP'U]/ML
4000 INJECTION, SOLUTION INTRAVENOUS; SUBCUTANEOUS PRN
Status: DISCONTINUED | OUTPATIENT
Start: 2020-12-13 | End: 2020-12-14

## 2020-12-13 RX ORDER — BUPROPION HYDROCHLORIDE 75 MG/1
75 TABLET ORAL 2 TIMES DAILY
Status: DISCONTINUED | OUTPATIENT
Start: 2020-12-13 | End: 2020-12-15 | Stop reason: HOSPADM

## 2020-12-13 RX ORDER — ACETAMINOPHEN 325 MG/1
650 TABLET ORAL EVERY 4 HOURS PRN
Status: DISCONTINUED | OUTPATIENT
Start: 2020-12-13 | End: 2020-12-13

## 2020-12-13 RX ORDER — TRAMADOL HYDROCHLORIDE 50 MG/1
50 TABLET ORAL EVERY 6 HOURS PRN
Status: DISCONTINUED | OUTPATIENT
Start: 2020-12-13 | End: 2020-12-15 | Stop reason: HOSPADM

## 2020-12-13 RX ORDER — ACETAMINOPHEN 325 MG/1
650 TABLET ORAL EVERY 6 HOURS PRN
Status: DISCONTINUED | OUTPATIENT
Start: 2020-12-13 | End: 2020-12-15 | Stop reason: HOSPADM

## 2020-12-13 RX ORDER — SODIUM CHLORIDE 0.9 % (FLUSH) 0.9 %
10 SYRINGE (ML) INJECTION PRN
Status: DISCONTINUED | OUTPATIENT
Start: 2020-12-13 | End: 2020-12-15 | Stop reason: HOSPADM

## 2020-12-13 RX ORDER — IBUPROFEN 800 MG/1
800 TABLET ORAL EVERY 6 HOURS PRN
Status: ON HOLD | COMMUNITY
End: 2020-12-15 | Stop reason: HOSPADM

## 2020-12-13 RX ORDER — ARIPIPRAZOLE 15 MG/1
10 TABLET ORAL DAILY
COMMUNITY
End: 2021-03-17 | Stop reason: DRUGHIGH

## 2020-12-13 RX ORDER — SODIUM CHLORIDE 0.9 % (FLUSH) 0.9 %
10 SYRINGE (ML) INJECTION EVERY 12 HOURS SCHEDULED
Status: DISCONTINUED | OUTPATIENT
Start: 2020-12-13 | End: 2020-12-15 | Stop reason: HOSPADM

## 2020-12-13 RX ORDER — CARVEDILOL 25 MG/1
25 TABLET ORAL 2 TIMES DAILY WITH MEALS
Status: DISCONTINUED | OUTPATIENT
Start: 2020-12-13 | End: 2020-12-15 | Stop reason: HOSPADM

## 2020-12-13 RX ORDER — HEPARIN SODIUM 1000 [USP'U]/ML
2000 INJECTION, SOLUTION INTRAVENOUS; SUBCUTANEOUS PRN
Status: DISCONTINUED | OUTPATIENT
Start: 2020-12-13 | End: 2020-12-14

## 2020-12-13 RX ORDER — MIRTAZAPINE 30 MG/1
15 TABLET, FILM COATED ORAL NIGHTLY
COMMUNITY

## 2020-12-13 RX ORDER — PANTOPRAZOLE SODIUM 40 MG/1
40 TABLET, DELAYED RELEASE ORAL
Status: DISCONTINUED | OUTPATIENT
Start: 2020-12-14 | End: 2020-12-15 | Stop reason: HOSPADM

## 2020-12-13 RX ORDER — NITROGLYCERIN 20 MG/100ML
5 INJECTION INTRAVENOUS CONTINUOUS
Status: DISCONTINUED | OUTPATIENT
Start: 2020-12-13 | End: 2020-12-14

## 2020-12-13 RX ORDER — NITROGLYCERIN 0.4 MG/1
0.4 TABLET SUBLINGUAL EVERY 5 MIN PRN
Status: DISCONTINUED | OUTPATIENT
Start: 2020-12-13 | End: 2020-12-15 | Stop reason: HOSPADM

## 2020-12-13 RX ORDER — ARIPIPRAZOLE 15 MG/1
15 TABLET ORAL DAILY
Status: DISCONTINUED | OUTPATIENT
Start: 2020-12-14 | End: 2020-12-15 | Stop reason: HOSPADM

## 2020-12-13 RX ORDER — HEPARIN SODIUM 1000 [USP'U]/ML
4000 INJECTION, SOLUTION INTRAVENOUS; SUBCUTANEOUS ONCE
Status: COMPLETED | OUTPATIENT
Start: 2020-12-13 | End: 2020-12-13

## 2020-12-13 RX ORDER — ONDANSETRON 2 MG/ML
4 INJECTION INTRAMUSCULAR; INTRAVENOUS EVERY 6 HOURS PRN
Status: DISCONTINUED | OUTPATIENT
Start: 2020-12-13 | End: 2020-12-15 | Stop reason: HOSPADM

## 2020-12-13 RX ORDER — ASPIRIN 325 MG
325 TABLET ORAL 2 TIMES DAILY
Status: DISCONTINUED | OUTPATIENT
Start: 2020-12-13 | End: 2020-12-13

## 2020-12-13 RX ORDER — ASPIRIN 81 MG/1
324 TABLET, CHEWABLE ORAL ONCE
Status: COMPLETED | OUTPATIENT
Start: 2020-12-13 | End: 2020-12-13

## 2020-12-13 RX ORDER — VERAPAMIL HYDROCHLORIDE 240 MG/1
240 TABLET, FILM COATED, EXTENDED RELEASE ORAL
Status: DISCONTINUED | OUTPATIENT
Start: 2020-12-14 | End: 2020-12-15 | Stop reason: HOSPADM

## 2020-12-13 RX ORDER — FENTANYL CITRATE 50 UG/ML
25 INJECTION, SOLUTION INTRAMUSCULAR; INTRAVENOUS ONCE
Status: COMPLETED | OUTPATIENT
Start: 2020-12-13 | End: 2020-12-13

## 2020-12-13 RX ORDER — HEPARIN SODIUM 10000 [USP'U]/100ML
10.25 INJECTION, SOLUTION INTRAVENOUS CONTINUOUS
Status: DISCONTINUED | OUTPATIENT
Start: 2020-12-13 | End: 2020-12-14

## 2020-12-13 RX ORDER — KETOROLAC TROMETHAMINE 30 MG/ML
30 INJECTION, SOLUTION INTRAMUSCULAR; INTRAVENOUS ONCE
Status: COMPLETED | OUTPATIENT
Start: 2020-12-13 | End: 2020-12-13

## 2020-12-13 RX ORDER — ACETAMINOPHEN 650 MG/1
650 SUPPOSITORY RECTAL EVERY 6 HOURS PRN
Status: DISCONTINUED | OUTPATIENT
Start: 2020-12-13 | End: 2020-12-15 | Stop reason: HOSPADM

## 2020-12-13 RX ORDER — POLYETHYLENE GLYCOL 3350 17 G/17G
17 POWDER, FOR SOLUTION ORAL DAILY PRN
Status: DISCONTINUED | OUTPATIENT
Start: 2020-12-13 | End: 2020-12-15 | Stop reason: HOSPADM

## 2020-12-13 RX ORDER — PROMETHAZINE HYDROCHLORIDE 25 MG/1
12.5 TABLET ORAL EVERY 6 HOURS PRN
Status: DISCONTINUED | OUTPATIENT
Start: 2020-12-13 | End: 2020-12-15 | Stop reason: HOSPADM

## 2020-12-13 RX ORDER — MORPHINE SULFATE 4 MG/ML
4 INJECTION, SOLUTION INTRAMUSCULAR; INTRAVENOUS ONCE
Status: COMPLETED | OUTPATIENT
Start: 2020-12-13 | End: 2020-12-13

## 2020-12-13 RX ORDER — PRAVASTATIN SODIUM 20 MG
20 TABLET ORAL NIGHTLY
Status: DISCONTINUED | OUTPATIENT
Start: 2020-12-13 | End: 2020-12-14

## 2020-12-13 RX ADMIN — SODIUM CHLORIDE, PRESERVATIVE FREE 10 ML: 5 INJECTION INTRAVENOUS at 22:11

## 2020-12-13 RX ADMIN — ASPIRIN 324 MG: 81 TABLET, CHEWABLE ORAL at 12:51

## 2020-12-13 RX ADMIN — SODIUM CHLORIDE, PRESERVATIVE FREE 10 ML: 5 INJECTION INTRAVENOUS at 22:51

## 2020-12-13 RX ADMIN — NITROGLYCERIN 1.5 MCG/MIN: 20 INJECTION INTRAVENOUS at 15:37

## 2020-12-13 RX ADMIN — BUPROPION HYDROCHLORIDE 75 MG: 75 TABLET, FILM COATED ORAL at 22:01

## 2020-12-13 RX ADMIN — MORPHINE SULFATE 4 MG: 4 INJECTION, SOLUTION INTRAMUSCULAR; INTRAVENOUS at 13:41

## 2020-12-13 RX ADMIN — FENTANYL CITRATE 25 MCG: 50 INJECTION, SOLUTION INTRAMUSCULAR; INTRAVENOUS at 22:50

## 2020-12-13 RX ADMIN — CARVEDILOL 25 MG: 25 TABLET, FILM COATED ORAL at 22:02

## 2020-12-13 RX ADMIN — HEPARIN SODIUM 4000 UNITS: 1000 INJECTION INTRAVENOUS; SUBCUTANEOUS at 15:18

## 2020-12-13 RX ADMIN — HEPARIN SODIUM AND DEXTROSE 10.25 UNITS/KG/HR: 10000; 5 INJECTION INTRAVENOUS at 15:16

## 2020-12-13 RX ADMIN — NITROGLYCERIN 0.4 MG: 0.4 TABLET, ORALLY DISINTEGRATING SUBLINGUAL at 12:56

## 2020-12-13 RX ADMIN — NITROGLYCERIN 0.4 MG: 0.4 TABLET, ORALLY DISINTEGRATING SUBLINGUAL at 12:51

## 2020-12-13 RX ADMIN — KETOROLAC TROMETHAMINE 30 MG: 30 INJECTION, SOLUTION INTRAMUSCULAR at 11:08

## 2020-12-13 RX ADMIN — MIRTAZAPINE 30 MG: 15 TABLET, FILM COATED ORAL at 22:02

## 2020-12-13 RX ADMIN — IOPAMIDOL 100 ML: 755 INJECTION, SOLUTION INTRAVENOUS at 14:27

## 2020-12-13 RX ADMIN — NITROGLYCERIN 26.67 MCG/MIN: 20 INJECTION INTRAVENOUS at 22:08

## 2020-12-13 RX ADMIN — PRAVASTATIN SODIUM 20 MG: 20 TABLET ORAL at 22:01

## 2020-12-13 RX ADMIN — LIDOCAINE HYDROCHLORIDE: 20 SOLUTION ORAL; TOPICAL at 14:40

## 2020-12-13 RX ADMIN — TRAMADOL HYDROCHLORIDE 50 MG: 50 TABLET ORAL at 22:17

## 2020-12-13 ASSESSMENT — PAIN SCALES - GENERAL
PAINLEVEL_OUTOF10: 10
PAINLEVEL_OUTOF10: 8
PAINLEVEL_OUTOF10: 10
PAINLEVEL_OUTOF10: 4
PAINLEVEL_OUTOF10: 8
PAINLEVEL_OUTOF10: 6
PAINLEVEL_OUTOF10: 8
PAINLEVEL_OUTOF10: 8
PAINLEVEL_OUTOF10: 10
PAINLEVEL_OUTOF10: 5
PAINLEVEL_OUTOF10: 5
PAINLEVEL_OUTOF10: 10

## 2020-12-13 ASSESSMENT — PAIN DESCRIPTION - LOCATION
LOCATION: CHEST;SHOULDER
LOCATION: CHEST;BACK
LOCATION: CHEST;BACK
LOCATION: CHEST;NECK
LOCATION: CHEST;SHOULDER
LOCATION: BACK;CHEST
LOCATION: CHEST;BACK
LOCATION: BACK;CHEST
LOCATION: CHEST;BACK

## 2020-12-13 ASSESSMENT — PAIN DESCRIPTION - PAIN TYPE
TYPE: ACUTE PAIN

## 2020-12-13 ASSESSMENT — PAIN DESCRIPTION - PROGRESSION
CLINICAL_PROGRESSION: NOT CHANGED
CLINICAL_PROGRESSION: NOT CHANGED
CLINICAL_PROGRESSION: GRADUALLY IMPROVING
CLINICAL_PROGRESSION: NOT CHANGED
CLINICAL_PROGRESSION: NOT CHANGED
CLINICAL_PROGRESSION: GRADUALLY WORSENING

## 2020-12-13 ASSESSMENT — PAIN DESCRIPTION - ORIENTATION
ORIENTATION: RIGHT
ORIENTATION: LEFT;RIGHT;LOWER;UPPER
ORIENTATION: LEFT;RIGHT;UPPER
ORIENTATION: UPPER
ORIENTATION: LEFT;RIGHT;UPPER
ORIENTATION: MID

## 2020-12-13 ASSESSMENT — PAIN DESCRIPTION - FREQUENCY
FREQUENCY: CONTINUOUS

## 2020-12-13 ASSESSMENT — PAIN DESCRIPTION - ONSET
ONSET: ON-GOING
ONSET: ON-GOING

## 2020-12-13 ASSESSMENT — PAIN DESCRIPTION - DESCRIPTORS
DESCRIPTORS: SQUEEZING
DESCRIPTORS: DISCOMFORT;STABBING
DESCRIPTORS: DISCOMFORT
DESCRIPTORS: SQUEEZING

## 2020-12-13 NOTE — ED PROVIDER NOTES
clavicular fracture internal fx (Left, 4/12/2018); Neck surgery (04/16/2018); pr airways surgical procedure unlisted (N/A, 4/17/2018); pr bronchoscopy w/ther aspir trachbrncl tree 1st (4/17/2018); and open tx trimalleolar ankle fx w fix pst lip (Right, 4/19/2018). Social History:  reports that he quit smoking about 2 years ago. He started smoking about 39 years ago. He smoked 1.00 pack per day. He has never used smokeless tobacco. He reports that he does not drink alcohol or use drugs. Family History: family history is not on file. The patients home medications have been reviewed. Allergies: Patient has no known allergies. ----------------------------------------PHYSICAL EXAM--------------------------------------  Constitutional:  Well developed, well nourished, no acute distress, non-toxic appearance   Eyes:  PERRL, conjunctiva normal, EOMI  HENT:  Atraumatic, external ears normal, nose normal, oropharynx moist, no pharyngeal exudates. Neck- normal range of motion, no nuchal rigidity   Respiratory:  No respiratory distress, normal breath sounds, no rales, no wheezing   Cardiovascular:  Normal rate, normal rhythm, no murmurs, no gallops, no rubs. Radial and DP pulses 2+ bilaterally. GI:  Soft, nondistended, normal bowel sounds, nontender, no organomegaly, no mass, no rebound, no guarding   :  No costovertebral angle tenderness   Musculoskeletal:  No edema, no tenderness, no deformities. Back-no midline or paraspinal cervical, thoracic or lumbar tenderness. No step-offs. Chest stable. Pelvis stable. Moves all 4 extremities without difficulty or pain. Integument:  Well hydrated, no rash. Adequate perfusion. Lymphatic:  No cervical lymphadenopathy noted   Neurologic:  Alert & oriented x 3, CN 2-12 normal, no focal deficits noted. Normal gait.     Psychiatric:  Speech and behavior appropriate       -------------------------------------------------- RESULTS -------------------------------------------------  I have personally reviewed all laboratory and imaging results for this patient. Results are listed below.      LABS:  Results for orders placed or performed during the hospital encounter of 12/13/20   Lipase   Result Value Ref Range    Lipase 36 13 - 60 U/L   Lactic Acid, Plasma   Result Value Ref Range    Lactic Acid 1.0 0.5 - 2.2 mmol/L   CBC auto differential   Result Value Ref Range    WBC 7.9 4.5 - 11.5 E9/L    RBC 5.04 3.80 - 5.80 E12/L    Hemoglobin 15.7 12.5 - 16.5 g/dL    Hematocrit 45.9 37.0 - 54.0 %    MCV 91.1 80.0 - 99.9 fL    MCH 31.2 26.0 - 35.0 pg    MCHC 34.2 32.0 - 34.5 %    RDW 13.6 11.5 - 15.0 fL    Platelets 136 154 - 071 E9/L    MPV 10.1 7.0 - 12.0 fL    Neutrophils % 64.1 43.0 - 80.0 %    Immature Granulocytes % 0.5 0.0 - 5.0 %    Lymphocytes % 23.9 20.0 - 42.0 %    Monocytes % 8.5 2.0 - 12.0 %    Eosinophils % 2.2 0.0 - 6.0 %    Basophils % 0.8 0.0 - 2.0 %    Neutrophils Absolute 5.06 1.80 - 7.30 E9/L    Immature Granulocytes # 0.04 E9/L    Lymphocytes Absolute 1.88 1.50 - 4.00 E9/L    Monocytes Absolute 0.67 0.10 - 0.95 E9/L    Eosinophils Absolute 0.17 0.05 - 0.50 E9/L    Basophils Absolute 0.06 0.00 - 0.20 E9/L   Comprehensive Metabolic Panel   Result Value Ref Range    Sodium 137 132 - 146 mmol/L    Potassium 4.6 3.5 - 5.0 mmol/L    Chloride 101 98 - 107 mmol/L    CO2 26 22 - 29 mmol/L    Anion Gap 10 7 - 16 mmol/L    Glucose 154 (H) 74 - 99 mg/dL    BUN 13 6 - 20 mg/dL    CREATININE 0.8 0.7 - 1.2 mg/dL    GFR Non-African American >60 >=60 mL/min/1.73    GFR African American >60     Calcium 9.7 8.6 - 10.2 mg/dL    Total Protein 7.6 6.4 - 8.3 g/dL    Alb 4.3 3.5 - 5.2 g/dL    Total Bilirubin 0.3 0.0 - 1.2 mg/dL    Alkaline Phosphatase 130 (H) 40 - 129 U/L    ALT 77 (H) 0 - 40 U/L    AST 52 (H) 0 - 39 U/L   Troponin   Result Value Ref Range    Troponin 0.09 (H) 0.00 - 0.03 ng/mL   CK   Result Value Ref Range    Total  (H) 20 - 200 U/L CKMB   Result Value Ref Range    CK-MB 44.6 (H) 0.0 - 7.7 ng/mL   SPECIMEN REJECTION   Result Value Ref Range    Rejected Test dimer     Reason for Rejection see below    SPECIMEN REJECTION   Result Value Ref Range    Rejected Test dimer     Reason for Rejection see below    D-Dimer, Quantitative   Result Value Ref Range    D-Dimer, Quant 3404 ng/mL DDU   EKG 12 Lead   Result Value Ref Range    Ventricular Rate 63 BPM    Atrial Rate 63 BPM    P-R Interval 194 ms    QRS Duration 110 ms    Q-T Interval 428 ms    QTc Calculation (Bazett) 437 ms    P Axis 48 degrees    R Axis 44 degrees    T Axis 29 degrees       RADIOLOGY:  Interpreted by Radiologist.  CTA CHEST W CONTRAST   Final Result   1. No aneurysm or dissection is seen in the 1aorta or its major branches. 2. No significant major arterial stenosis seen in the chest, abdomen and   pelvis. 3. No acute abnormality seen in the chest, abdomen and pelvis. 4. Mild cardiomegaly. 5. Prominent sigmoid diverticulosis without diverticulitis. 6. 2 mm focus of punctate enhancement in the medial segment of the left lobe   of the liver is nonspecific but likely benign in etiology (hemangioma or FNH). CTA ABDOMEN PELVIS W CONTRAST   Final Result   1. No aneurysm or dissection is seen in the 1aorta or its major branches. 2. No significant major arterial stenosis seen in the chest, abdomen and   pelvis. 3. No acute abnormality seen in the chest, abdomen and pelvis. 4. Mild cardiomegaly. 5. Prominent sigmoid diverticulosis without diverticulitis. 6. 2 mm focus of punctate enhancement in the medial segment of the left lobe   of the liver is nonspecific but likely benign in etiology (hemangioma or FNH). XR CHEST PORTABLE   Final Result   No acute process.             EKG #1 Interpretation  Time: 1041  Rhythm: normal sinus   Rate: 68  Axis: normal  Conduction: normal  ST Segments: no acute change  T Waves: no acute change  Clinical Impression: no acute changes  Comparison to prior EKG: no previous available    EKG #2 Interpretation  Time: 1231  Rhythm: normal sinus   Rate: 63  Axis: normal  Conduction: normal  ST Segments: no acute change  T Waves: no acute change  Clinical Impression: no acute changes  Comparison to prior EKG: no previous available    ------------------------- NURSING NOTES AND VITALS REVIEWED ---------------------------  The nursing notes within the ED encounter and vital signs as below have been reviewed by myself. BP (!) 168/84   Pulse 74   Temp 98.2 °F (36.8 °C) (Temporal)   Resp 16   Ht 5' 11\" (1.803 m)   Wt 215 lb (97.5 kg)   SpO2 100%   BMI 29.99 kg/m²   Oxygen Saturation Interpretation: Normal      The patients available past medical records and past encounters were reviewed. ------------------------------ ED COURSE/MEDICAL DECISION MAKING----------------------  Medications   nitroGLYCERIN (NITROSTAT) SL tablet 0.4 mg (0.4 mg Sublingual Given 20 1256)   heparin (porcine) injection 4,000 Units (has no administration in time range)   heparin (porcine) injection 4,000 Units (has no administration in time range)   heparin (porcine) injection 2,000 Units (has no administration in time range)   heparin 25,000 units in dextrose 5% 250 mL infusion (has no administration in time range)   ketorolac (TORADOL) injection 30 mg (30 mg Intravenous Given 20 1108)   aspirin chewable tablet 324 mg (324 mg Oral Given 20 1251)   morphine sulfate (PF) injection 4 mg (4 mg Intravenous Given 20 1341)   iopamidol (ISOVUE-370) 76 % injection 100 mL (100 mLs Intravenous Given 20 1427)   aluminum & magnesium hydroxide-simethicone (MAALOX) 30 mL, lidocaine viscous hcl (XYLOCAINE) 5 mL (GI COCKTAIL) ( Oral Given 20 1440)           Procedures:   none      Medical Decision Makin  Patient reported subsernal chest pain.  EKG repeat done, no STEMI criteria, ASA 324mg ordered and SL NTG ordered (no relief), had relief with Morphine. Trop/ck/ckmb all mildly eleveted. CTA negative for dissection. Will proceed with NSTEMI diagnosis and start heparin. 1433  Patient c/o back pain and chest pain again but asking for a meal tray. . Will start IV NTG. Repeat trop due at 16:30. Dr Zack Zepeda to follow up on it. This patient's ED course included: re-evaluation prior to disposition, IV medications, cardiac monitoring, continuous pulse oximetry, complex medical decision making and emergency management and a personal history and physicial eaxmination    This patient has remained hemodynamically stable, improved and been closely monitored during their ED course. Re-Evaluations:  Time: 2:17 PM EST  Improved after morphine, asking for food   Re-evaluation. Patients symptoms are improving  Repeat physical examination is not changed      Consultations:   2:17 PM EST  Spoke with Dr Tano Sylvester, discussed case, accepts admission    Critical Care: Please note that the withdrawal or failure to initiate urgent interventions for this patient would likely result in a life threatening deterioration or permanent disability. Accordingly this patient received 30 minutes of critical care time, excluding separately billable procedures. Kevin Baker DO, am the Primary Provider of Record    Counseling: The emergency provider has spoken with the patient and discussed todays results, in addition to providing specific details for the plan of care and counseling regarding the diagnosis and prognosis. Questions are answered at this time and they are agreeable with the plan.    --------------------------- IMPRESSION AND DISPOSITION ---------------------------------    IMPRESSION  1. NSTEMI (non-ST elevated myocardial infarction) ([facility]    2.  Dissection of abdominal aorta ([facility]        DISPOSITION  Disposition: Admit to telemetry  Patient condition is stable             Jazzy Wren DO  12/13/20 [id number]

## 2020-12-13 NOTE — ED NOTES
Dr Rae Carvalho notified of pt continued c/o pain and no relief post morphine, no new orders.      Mala Duong RN  12/13/20 [id number]

## 2020-12-14 LAB
ABO/RH: NORMAL
ANION GAP SERPL CALCULATED.3IONS-SCNC: 12 MMOL/L (ref 7–16)
ANTIBODY SCREEN: NORMAL
APTT: 31.6 SEC (ref 24.5–35.1)
BUN BLDV-MCNC: 12 MG/DL (ref 6–20)
CALCIUM SERPL-MCNC: 9.1 MG/DL (ref 8.6–10.2)
CHLORIDE BLD-SCNC: 102 MMOL/L (ref 98–107)
CK MB: 96 NG/ML (ref 0–7.7)
CO2: 23 MMOL/L (ref 22–29)
CREAT SERPL-MCNC: 0.7 MG/DL (ref 0.7–1.2)
EKG ATRIAL RATE: 78 BPM
EKG ATRIAL RATE: 78 BPM
EKG P AXIS: 51 DEGREES
EKG P AXIS: 59 DEGREES
EKG P-R INTERVAL: 182 MS
EKG P-R INTERVAL: 184 MS
EKG Q-T INTERVAL: 392 MS
EKG Q-T INTERVAL: 406 MS
EKG QRS DURATION: 106 MS
EKG QRS DURATION: 112 MS
EKG QTC CALCULATION (BAZETT): 446 MS
EKG QTC CALCULATION (BAZETT): 462 MS
EKG R AXIS: -3 DEGREES
EKG R AXIS: 0 DEGREES
EKG T AXIS: 15 DEGREES
EKG T AXIS: 24 DEGREES
EKG VENTRICULAR RATE: 78 BPM
EKG VENTRICULAR RATE: 78 BPM
GFR AFRICAN AMERICAN: >60
GFR NON-AFRICAN AMERICAN: >60 ML/MIN/1.73
GLUCOSE BLD-MCNC: 131 MG/DL (ref 74–99)
HCT VFR BLD CALC: 39.8 % (ref 37–54)
HEMOGLOBIN: 13.7 G/DL (ref 12.5–16.5)
MCH RBC QN AUTO: 30.2 PG (ref 26–35)
MCHC RBC AUTO-ENTMCNC: 34.4 % (ref 32–34.5)
MCV RBC AUTO: 87.7 FL (ref 80–99.9)
PDW BLD-RTO: 13.2 FL (ref 11.5–15)
PLATELET # BLD: 193 E9/L (ref 130–450)
PMV BLD AUTO: 10 FL (ref 7–12)
POC ACT LR: 192 SECONDS
POC ACT LR: 231 SECONDS
POTASSIUM REFLEX MAGNESIUM: 4 MMOL/L (ref 3.5–5)
RBC # BLD: 4.54 E12/L (ref 3.8–5.8)
SODIUM BLD-SCNC: 137 MMOL/L (ref 132–146)
TOTAL CK: 892 U/L (ref 20–200)
TROPONIN: 0.59 NG/ML (ref 0–0.03)
TROPONIN: 4.09 NG/ML (ref 0–0.03)
WBC # BLD: 11.7 E9/L (ref 4.5–11.5)

## 2020-12-14 PROCEDURE — 86900 BLOOD TYPING SEROLOGIC ABO: CPT

## 2020-12-14 PROCEDURE — 99223 1ST HOSP IP/OBS HIGH 75: CPT | Performed by: INTERNAL MEDICINE

## 2020-12-14 PROCEDURE — 93010 ELECTROCARDIOGRAM REPORT: CPT | Performed by: INTERNAL MEDICINE

## 2020-12-14 PROCEDURE — 4A023N7 MEASUREMENT OF CARDIAC SAMPLING AND PRESSURE, LEFT HEART, PERCUTANEOUS APPROACH: ICD-10-PCS | Performed by: INTERNAL MEDICINE

## 2020-12-14 PROCEDURE — 2500000003 HC RX 250 WO HCPCS

## 2020-12-14 PROCEDURE — C9600 PERC DRUG-EL COR STENT SING: HCPCS | Performed by: INTERNAL MEDICINE

## 2020-12-14 PROCEDURE — 86901 BLOOD TYPING SEROLOGIC RH(D): CPT

## 2020-12-14 PROCEDURE — 84484 ASSAY OF TROPONIN QUANT: CPT

## 2020-12-14 PROCEDURE — 6370000000 HC RX 637 (ALT 250 FOR IP): Performed by: INTERNAL MEDICINE

## 2020-12-14 PROCEDURE — 85730 THROMBOPLASTIN TIME PARTIAL: CPT

## 2020-12-14 PROCEDURE — B2111ZZ FLUOROSCOPY OF MULTIPLE CORONARY ARTERIES USING LOW OSMOLAR CONTRAST: ICD-10-PCS | Performed by: INTERNAL MEDICINE

## 2020-12-14 PROCEDURE — 2140000000 HC CCU INTERMEDIATE R&B

## 2020-12-14 PROCEDURE — 027034Z DILATION OF CORONARY ARTERY, ONE ARTERY WITH DRUG-ELUTING INTRALUMINAL DEVICE, PERCUTANEOUS APPROACH: ICD-10-PCS | Performed by: INTERNAL MEDICINE

## 2020-12-14 PROCEDURE — C1769 GUIDE WIRE: HCPCS

## 2020-12-14 PROCEDURE — 6370000000 HC RX 637 (ALT 250 FOR IP)

## 2020-12-14 PROCEDURE — APPSS60 APP SPLIT SHARED TIME 46-60 MINUTES: Performed by: NURSE PRACTITIONER

## 2020-12-14 PROCEDURE — 85347 COAGULATION TIME ACTIVATED: CPT

## 2020-12-14 PROCEDURE — 2709999900 HC NON-CHARGEABLE SUPPLY

## 2020-12-14 PROCEDURE — 2580000003 HC RX 258: Performed by: INTERNAL MEDICINE

## 2020-12-14 PROCEDURE — 86850 RBC ANTIBODY SCREEN: CPT

## 2020-12-14 PROCEDURE — 92928 PRQ TCAT PLMT NTRAC ST 1 LES: CPT | Performed by: INTERNAL MEDICINE

## 2020-12-14 PROCEDURE — 93458 L HRT ARTERY/VENTRICLE ANGIO: CPT | Performed by: INTERNAL MEDICINE

## 2020-12-14 PROCEDURE — C1725 CATH, TRANSLUMIN NON-LASER: HCPCS

## 2020-12-14 PROCEDURE — 93005 ELECTROCARDIOGRAM TRACING: CPT | Performed by: INTERNAL MEDICINE

## 2020-12-14 PROCEDURE — 93005 ELECTROCARDIOGRAM TRACING: CPT | Performed by: FAMILY MEDICINE

## 2020-12-14 PROCEDURE — 6360000002 HC RX W HCPCS

## 2020-12-14 PROCEDURE — 6360000002 HC RX W HCPCS: Performed by: FAMILY MEDICINE

## 2020-12-14 PROCEDURE — C1887 CATHETER, GUIDING: HCPCS

## 2020-12-14 PROCEDURE — 82550 ASSAY OF CK (CPK): CPT

## 2020-12-14 PROCEDURE — C1874 STENT, COATED/COV W/DEL SYS: HCPCS

## 2020-12-14 PROCEDURE — 6360000002 HC RX W HCPCS: Performed by: EMERGENCY MEDICINE

## 2020-12-14 PROCEDURE — B2151ZZ FLUOROSCOPY OF LEFT HEART USING LOW OSMOLAR CONTRAST: ICD-10-PCS | Performed by: INTERNAL MEDICINE

## 2020-12-14 PROCEDURE — C1894 INTRO/SHEATH, NON-LASER: HCPCS

## 2020-12-14 PROCEDURE — 36415 COLL VENOUS BLD VENIPUNCTURE: CPT

## 2020-12-14 PROCEDURE — 85027 COMPLETE CBC AUTOMATED: CPT

## 2020-12-14 PROCEDURE — 82553 CREATINE MB FRACTION: CPT

## 2020-12-14 PROCEDURE — 2700000000 HC OXYGEN THERAPY PER DAY

## 2020-12-14 PROCEDURE — 80048 BASIC METABOLIC PNL TOTAL CA: CPT

## 2020-12-14 PROCEDURE — 94640 AIRWAY INHALATION TREATMENT: CPT

## 2020-12-14 RX ORDER — ACETAMINOPHEN 325 MG/1
650 TABLET ORAL EVERY 4 HOURS PRN
Status: DISCONTINUED | OUTPATIENT
Start: 2020-12-14 | End: 2020-12-14 | Stop reason: ALTCHOICE

## 2020-12-14 RX ORDER — PRASUGREL 10 MG/1
10 TABLET, FILM COATED ORAL DAILY
Status: DISCONTINUED | OUTPATIENT
Start: 2020-12-15 | End: 2020-12-15 | Stop reason: HOSPADM

## 2020-12-14 RX ORDER — MORPHINE SULFATE 2 MG/ML
4 INJECTION, SOLUTION INTRAMUSCULAR; INTRAVENOUS ONCE
Status: COMPLETED | OUTPATIENT
Start: 2020-12-14 | End: 2020-12-14

## 2020-12-14 RX ORDER — IPRATROPIUM BROMIDE AND ALBUTEROL SULFATE 2.5; .5 MG/3ML; MG/3ML
1 SOLUTION RESPIRATORY (INHALATION)
Status: DISCONTINUED | OUTPATIENT
Start: 2020-12-14 | End: 2020-12-15 | Stop reason: HOSPADM

## 2020-12-14 RX ORDER — PRAVASTATIN SODIUM 20 MG
80 TABLET ORAL NIGHTLY
Status: DISCONTINUED | OUTPATIENT
Start: 2020-12-14 | End: 2020-12-15 | Stop reason: HOSPADM

## 2020-12-14 RX ORDER — MORPHINE SULFATE 2 MG/ML
2 INJECTION, SOLUTION INTRAMUSCULAR; INTRAVENOUS EVERY 4 HOURS PRN
Status: DISCONTINUED | OUTPATIENT
Start: 2020-12-14 | End: 2020-12-14

## 2020-12-14 RX ORDER — SODIUM CHLORIDE 9 MG/ML
INJECTION, SOLUTION INTRAVENOUS CONTINUOUS
Status: ACTIVE | OUTPATIENT
Start: 2020-12-14 | End: 2020-12-14

## 2020-12-14 RX ORDER — ASPIRIN 81 MG/1
81 TABLET, CHEWABLE ORAL DAILY
Status: DISCONTINUED | OUTPATIENT
Start: 2020-12-15 | End: 2020-12-15 | Stop reason: HOSPADM

## 2020-12-14 RX ORDER — ISOSORBIDE MONONITRATE 30 MG/1
30 TABLET, EXTENDED RELEASE ORAL DAILY
Status: DISCONTINUED | OUTPATIENT
Start: 2020-12-14 | End: 2020-12-15 | Stop reason: HOSPADM

## 2020-12-14 RX ADMIN — HEPARIN SODIUM 2000 UNITS: 1000 INJECTION INTRAVENOUS; SUBCUTANEOUS at 02:47

## 2020-12-14 RX ADMIN — SODIUM CHLORIDE, PRESERVATIVE FREE 10 ML: 5 INJECTION INTRAVENOUS at 09:47

## 2020-12-14 RX ADMIN — LIDOCAINE HYDROCHLORIDE: 20 SOLUTION ORAL; TOPICAL at 00:51

## 2020-12-14 RX ADMIN — SODIUM CHLORIDE: 9 INJECTION, SOLUTION INTRAVENOUS at 09:51

## 2020-12-14 RX ADMIN — MIRTAZAPINE 30 MG: 15 TABLET, FILM COATED ORAL at 22:48

## 2020-12-14 RX ADMIN — VERAPAMIL HYDROCHLORIDE 240 MG: 240 TABLET, FILM COATED, EXTENDED RELEASE ORAL at 09:47

## 2020-12-14 RX ADMIN — BUPROPION HYDROCHLORIDE 75 MG: 75 TABLET, FILM COATED ORAL at 09:47

## 2020-12-14 RX ADMIN — HEPARIN SODIUM AND DEXTROSE 12 UNITS/KG/HR: 10000; 5 INJECTION INTRAVENOUS at 02:48

## 2020-12-14 RX ADMIN — MORPHINE SULFATE 4 MG: 2 INJECTION, SOLUTION INTRAMUSCULAR; INTRAVENOUS at 00:16

## 2020-12-14 RX ADMIN — IPRATROPIUM BROMIDE AND ALBUTEROL SULFATE 1 AMPULE: .5; 3 SOLUTION RESPIRATORY (INHALATION) at 16:45

## 2020-12-14 RX ADMIN — TRAMADOL HYDROCHLORIDE 50 MG: 50 TABLET ORAL at 09:56

## 2020-12-14 RX ADMIN — SODIUM CHLORIDE, PRESERVATIVE FREE 10 ML: 5 INJECTION INTRAVENOUS at 22:48

## 2020-12-14 RX ADMIN — ASPIRIN 325 MG: 325 TABLET, COATED ORAL at 08:03

## 2020-12-14 RX ADMIN — IPRATROPIUM BROMIDE AND ALBUTEROL SULFATE 1 AMPULE: .5; 3 SOLUTION RESPIRATORY (INHALATION) at 20:25

## 2020-12-14 RX ADMIN — MORPHINE SULFATE 2 MG: 2 INJECTION, SOLUTION INTRAMUSCULAR; INTRAVENOUS at 05:10

## 2020-12-14 RX ADMIN — PRAVASTATIN SODIUM 80 MG: 20 TABLET ORAL at 22:48

## 2020-12-14 RX ADMIN — SODIUM CHLORIDE, PRESERVATIVE FREE 10 ML: 5 INJECTION INTRAVENOUS at 00:17

## 2020-12-14 RX ADMIN — BUPROPION HYDROCHLORIDE 75 MG: 75 TABLET, FILM COATED ORAL at 22:48

## 2020-12-14 RX ADMIN — CARVEDILOL 25 MG: 25 TABLET, FILM COATED ORAL at 09:47

## 2020-12-14 RX ADMIN — ARIPIPRAZOLE 15 MG: 15 TABLET ORAL at 09:47

## 2020-12-14 RX ADMIN — PANTOPRAZOLE SODIUM 40 MG: 40 TABLET, DELAYED RELEASE ORAL at 09:47

## 2020-12-14 RX ADMIN — ISOSORBIDE MONONITRATE 30 MG: 30 TABLET ORAL at 12:22

## 2020-12-14 RX ADMIN — SODIUM CHLORIDE, PRESERVATIVE FREE 10 ML: 5 INJECTION INTRAVENOUS at 05:11

## 2020-12-14 RX ADMIN — CARVEDILOL 25 MG: 25 TABLET, FILM COATED ORAL at 17:57

## 2020-12-14 ASSESSMENT — PAIN DESCRIPTION - PAIN TYPE
TYPE: ACUTE PAIN
TYPE: ACUTE PAIN

## 2020-12-14 ASSESSMENT — PAIN SCALES - GENERAL
PAINLEVEL_OUTOF10: 10
PAINLEVEL_OUTOF10: 0
PAINLEVEL_OUTOF10: 8
PAINLEVEL_OUTOF10: 9

## 2020-12-14 NOTE — CARE COORDINATION
S/p NICHOLAS x 1 to OM. Attempted to meet with pt. Pt resting quietly on bed.  Sw/cm will attempt again later

## 2020-12-14 NOTE — PROGRESS NOTES
Evaluated the patient at bedside around 12:30 AM.  EKG was done which did not show any ST segment elevation. Repeat troponin was 0.59. Cardiology was notified of this and the cath lab team was activated. Currently patient is on aspirin, heparin and nitroglycerin IV. He also is receiving morphine for pain.

## 2020-12-14 NOTE — SIGNIFICANT EVENT
Called by nursing staff about this patient who is Dr Chris Guzman private patient to evaluate him for uncontrolled chest pain in the setting of NSTEMI and make determination if cath lab should be activated. His nitro is being titrated and he is on heparin drip. CTA was done earlier given high D dimer. I ordered morphine, stat EKG and STAT labs. BP mildly elevated. I have informed Dr Kely Sinclair who is the Hospitalist in [facility] to personally evaluate him and decide if cath lab needs called. I have discussed with the nurse caring for the patient multiple times about his care and what needs to be done to stabilize him. The patient's admitting physician Dr Ebony Vigil needs to be notified ASAP as well.

## 2020-12-14 NOTE — PROGRESS NOTES
Dr. Mat Burrell returned call and stated that Dr. Whitney Linda should be called regarding the chest pain, as it his private patient not a [facility] patient. Dr. Whitney Linda returned the message and asked if [facility] would see the patient. Dr. Anjum Christensen came to the room to evaluate the patient.

## 2020-12-14 NOTE — CARE COORDINATION
SOCIAL WORK/CASEMANAGEMENT TRANSITION OF CARE [id number], [address], [place], [phone]): pt lives alone in a 1st floor apt with a ramp to enter the building. He has a mother and 2 sisters that check on him and 2 friends that help with filling his medications and cleaning. No [facility]hc pta. Pt has a fww, cane, shower chair and raised toilet seat. Pt is independent with bathing, dressing and meals. Family helps with that sometimes. Sister transports pt when needed. Pt is not a . He goes to dr. Jackson Reza which use to be a turning point but has been tele-counseling about 1x a month. Plan is home with no needs. Pt said no issues with ambulation. Sw/cm to follow.  Pippa Levi  12/14/2020
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
## 2020-12-14 NOTE — PROCEDURES
[address]                  [address] [place],  [facility]                            CARDIAC CATHETERIZATION    PATIENT NAME: Naila Armstrong                    :        1965  MED REC NO:   [id number]                            ROOM:       [id number]  ACCOUNT NO:   [de-identified]                           ADMIT DATE: 2020  PROVIDER:     Joel Mix MD    DATE OF PROCEDURE:  2020    CARDIAC CATHETERIZATION AND ANGIOPLASTY REPORT    PROCEDURES:  Left heart catheterization, selective coronary angiography,  left ventriculography, balloon angioplasty with the deployment of  drug-eluting coronary stent to the obtuse marginal branch of the left  circumflex. The procedure was done through right radial approach using ultrasound  guidance. The patient received intravenous Versed and intravenous fentanyl for  sedation. INDICATION:  Non-ST-elevation myocardial infarction with continued chest  pain. PRESSURES:  1. Aorta 121/72 with a mean of 94.  2.  Left ventricular systolic pressure 516. Left ventricular  end-diastolic pressure 24.  3.  There was no gradient across the aortic valve. CORONARY ANGIOGRAPHY:  LEFT MAIN:  The left main artery did not appear to have any angiographic  disease. LAD:  The left anterior descending artery is a large vessel that wraps  around the left ventricular apex. It has around 30% eccentric luminal  narrowing in its proximal segment. The rest of the LAD did not appear  to have any significant disease. The first significant bifurcating  diagonal branch has 60% luminal narrowing involving its larger  subdivision. LCX:  The left circumflex is a nondominant vessel. It gives rise to  trivial first and second marginal branches. The third marginal branch  has a 99% subtotal occlusion shortly after its origin with ONIEL-2 flow  in the distal vessel. The AV groove branch is a small vessel.   There extra support exchange wire was then successfully advanced through the  subtotal occlusion in the proximal segment of the large marginal branch  and further distally into that vessel. The site of subtotal occlusion  was dilated with a 2.5 mm x 15 mm balloon inflated to 6 atmospheres. This was followed by advancing a 2.5 mm x 18 mm [place] Resolute  drug-eluting coronary stent which was deployed at the site at 16  atmospheres. Contrast injection at the end of the procedure after the  deployment of the stent revealed very good results without any  significant residual stenosis with no evidence of dissections or flaps  and with ONIEL-3 flow in the vessel. The right radial arterial sheath was removed at the end of the procedure  and a TR band was applied with adequate hemostasis and with preservation  of pulse. The patient tolerated the procedure well and left the cardiac  catheterization laboratory in a stable condition. CONCLUSIONS:  1. Coronary artery disease:  a. Left main:  No significant angiographic disease. b.  LAD:  30% proximal vessel narrowing. 60% proximal narrowing of the  larger subdivision of the diagonal branch.  c.  LCX:  40% to 50% ostial narrowing and 30% to 40% proximal vessel  narrowing before the marginal branch which itself had 99% subtotal  occlusion with ONIEL-2 flow in this marginal branch. d.  RCA:  Dominant vessel with 50% proximal vessel narrowing, 50% to 60%  focal mid vessel narrowing and 30% to 40% disease of the large  posterolateral branch. 2.  Normal left ventricular size with no regional wall motion  abnormality noted on the 35-degree BARRAZA view with an estimated ejection  fraction of 60%. 3.  Elevated left ventricular end-diastolic pressure consistent with LV  diastolic dysfunction. 4.  Successful balloon angioplasty with deployment of drug-eluting  coronary stent to the obtuse marginal branch of the left circumflex with  very good results.         Radha Giang MD

## 2020-12-14 NOTE — PROGRESS NOTES
Sent message Via [employer] to Dr Magda Hutchinson RE: pt Chest Pain remains constant no relief from Fentanyl or increasing Tridil . Per Dr Kelly Zapata order consulted Hospitalist ( Dr. Magda Hutchinson on Call ) and have see patient and activate Cath Lab  If needed.   Awaiting return response
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
## 2020-12-15 VITALS
HEIGHT: 71 IN | OXYGEN SATURATION: 96 % | TEMPERATURE: 98.9 F | WEIGHT: 212.2 LBS | SYSTOLIC BLOOD PRESSURE: 116 MMHG | RESPIRATION RATE: 18 BRPM | DIASTOLIC BLOOD PRESSURE: 64 MMHG | HEART RATE: 71 BPM | BODY MASS INDEX: 29.71 KG/M2

## 2020-12-15 LAB
ANION GAP SERPL CALCULATED.3IONS-SCNC: 8 MMOL/L (ref 7–16)
BUN BLDV-MCNC: 12 MG/DL (ref 6–20)
CALCIUM SERPL-MCNC: 8.9 MG/DL (ref 8.6–10.2)
CHLORIDE BLD-SCNC: 106 MMOL/L (ref 98–107)
CO2: 27 MMOL/L (ref 22–29)
CREAT SERPL-MCNC: 0.9 MG/DL (ref 0.7–1.2)
EKG ATRIAL RATE: 72 BPM
EKG P AXIS: 54 DEGREES
EKG P-R INTERVAL: 186 MS
EKG Q-T INTERVAL: 400 MS
EKG QRS DURATION: 108 MS
EKG QTC CALCULATION (BAZETT): 438 MS
EKG R AXIS: 64 DEGREES
EKG T AXIS: 43 DEGREES
EKG VENTRICULAR RATE: 72 BPM
GFR AFRICAN AMERICAN: >60
GFR NON-AFRICAN AMERICAN: >60 ML/MIN/1.73
GLUCOSE BLD-MCNC: 97 MG/DL (ref 74–99)
POTASSIUM REFLEX MAGNESIUM: 3.8 MMOL/L (ref 3.5–5)
SODIUM BLD-SCNC: 141 MMOL/L (ref 132–146)

## 2020-12-15 PROCEDURE — 6370000000 HC RX 637 (ALT 250 FOR IP): Performed by: INTERNAL MEDICINE

## 2020-12-15 PROCEDURE — 99233 SBSQ HOSP IP/OBS HIGH 50: CPT | Performed by: INTERNAL MEDICINE

## 2020-12-15 PROCEDURE — 2580000003 HC RX 258: Performed by: INTERNAL MEDICINE

## 2020-12-15 PROCEDURE — 80048 BASIC METABOLIC PNL TOTAL CA: CPT

## 2020-12-15 PROCEDURE — 93010 ELECTROCARDIOGRAM REPORT: CPT | Performed by: INTERNAL MEDICINE

## 2020-12-15 PROCEDURE — 36415 COLL VENOUS BLD VENIPUNCTURE: CPT

## 2020-12-15 PROCEDURE — 93005 ELECTROCARDIOGRAM TRACING: CPT | Performed by: INTERNAL MEDICINE

## 2020-12-15 PROCEDURE — 94640 AIRWAY INHALATION TREATMENT: CPT

## 2020-12-15 RX ORDER — ASPIRIN 81 MG/1
81 TABLET, CHEWABLE ORAL DAILY
Qty: 30 TABLET | Refills: 3 | Status: SHIPPED | OUTPATIENT
Start: 2020-12-16 | End: 2021-03-17 | Stop reason: SDUPTHER

## 2020-12-15 RX ORDER — PRAVASTATIN SODIUM 80 MG/1
80 TABLET ORAL NIGHTLY
Qty: 30 TABLET | Refills: 3 | Status: SHIPPED | OUTPATIENT
Start: 2020-12-15 | End: 2021-03-17 | Stop reason: SDUPTHER

## 2020-12-15 RX ORDER — PRASUGREL 10 MG/1
10 TABLET, FILM COATED ORAL DAILY
Qty: 30 TABLET | Refills: 1 | Status: SHIPPED | OUTPATIENT
Start: 2020-12-16 | End: 2021-03-17 | Stop reason: SDUPTHER

## 2020-12-15 RX ORDER — ISOSORBIDE MONONITRATE 30 MG/1
30 TABLET, EXTENDED RELEASE ORAL DAILY
Qty: 30 TABLET | Refills: 3 | Status: SHIPPED | OUTPATIENT
Start: 2020-12-16 | End: 2021-03-17 | Stop reason: SDUPTHER

## 2020-12-15 RX ORDER — NITROGLYCERIN 0.4 MG/1
TABLET SUBLINGUAL
Qty: 25 TABLET | Refills: 3 | Status: SHIPPED | OUTPATIENT
Start: 2020-12-15

## 2020-12-15 RX ADMIN — VERAPAMIL HYDROCHLORIDE 240 MG: 240 TABLET, FILM COATED, EXTENDED RELEASE ORAL at 08:46

## 2020-12-15 RX ADMIN — IPRATROPIUM BROMIDE AND ALBUTEROL SULFATE 1 AMPULE: .5; 3 SOLUTION RESPIRATORY (INHALATION) at 07:47

## 2020-12-15 RX ADMIN — IPRATROPIUM BROMIDE AND ALBUTEROL SULFATE 1 AMPULE: .5; 3 SOLUTION RESPIRATORY (INHALATION) at 12:20

## 2020-12-15 RX ADMIN — CARVEDILOL 25 MG: 25 TABLET, FILM COATED ORAL at 08:46

## 2020-12-15 RX ADMIN — ARIPIPRAZOLE 15 MG: 15 TABLET ORAL at 08:46

## 2020-12-15 RX ADMIN — PANTOPRAZOLE SODIUM 40 MG: 40 TABLET, DELAYED RELEASE ORAL at 07:47

## 2020-12-15 RX ADMIN — PRASUGREL 10 MG: 10 TABLET, FILM COATED ORAL at 08:47

## 2020-12-15 RX ADMIN — ASPIRIN 81 MG: 81 TABLET, CHEWABLE ORAL at 08:47

## 2020-12-15 RX ADMIN — BUPROPION HYDROCHLORIDE 75 MG: 75 TABLET, FILM COATED ORAL at 08:47

## 2020-12-15 RX ADMIN — ISOSORBIDE MONONITRATE 30 MG: 30 TABLET ORAL at 08:46

## 2020-12-15 RX ADMIN — SODIUM CHLORIDE, PRESERVATIVE FREE 10 ML: 5 INJECTION INTRAVENOUS at 08:47

## 2020-12-15 RX ADMIN — IPRATROPIUM BROMIDE AND ALBUTEROL SULFATE 1 AMPULE: .5; 3 SOLUTION RESPIRATORY (INHALATION) at 16:59

## 2020-12-15 ASSESSMENT — PAIN SCALES - GENERAL
PAINLEVEL_OUTOF10: 0

## 2020-12-15 NOTE — CARE COORDINATION
Spoke with pt regarding transportation home. Pt stated his sister is going to call him an [employer] ride home when he is ready for discharge. Pt's nurse updated. Pt requested a phone  and one was provided.  Sw/cm will follow

## 2020-12-15 NOTE — CONSULTS
Met with patient and discussed that their physician has ordered a referral to our outpatient Phase II Cardiac Rehabilitation program. Reviewed the benefits of cardiac rehabilitation based on their diagnosis and personal risk factors. Patient demonstrates strong interest in Cardiac Rehabilitation at this time. Cardiac Rehabilitation brochure provided to patient/family. The Cardiac Rehabilitation Program has been provided the patient's referral information and pertinent patient details and history. The patient may call [facility] [place] at [phone] for additional information or questions. Contact information for [facility] [employer] and other choices close to the patient's residence have been provided in the discharge instructions so that the patient may call and schedule an appointment when cleared by their physician.  Thank you for the referral.

## 2020-12-15 NOTE — PLAN OF CARE
Problem: Infection - Surgical Site:  Goal: Will show no infection signs and symptoms  Description: Will show no infection signs and symptoms  Outcome: Met This Shift
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
## 2020-12-16 ENCOUNTER — TELEPHONE (OUTPATIENT)
Dept: CARDIAC CATH/INVASIVE PROCEDURES | Age: 55
End: 2020-12-16
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
## 2021-01-04 ENCOUNTER — TELEPHONE (OUTPATIENT)
Dept: CARDIAC REHAB | Age: 56
End: 2021-01-04

## 2021-01-04 NOTE — TELEPHONE ENCOUNTER
Pt called to cancel cardiac rehab eval d/t COVID-19 concern. Pt states he wants to wait until he receives the vaccine.
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
## 2021-03-17 ENCOUNTER — OFFICE VISIT (OUTPATIENT)
Dept: CARDIOLOGY CLINIC | Age: 56
End: 2021-03-17
Payer: COMMERCIAL

## 2021-03-17 VITALS
RESPIRATION RATE: 20 BRPM | BODY MASS INDEX: 32.26 KG/M2 | OXYGEN SATURATION: 96 % | HEART RATE: 77 BPM | DIASTOLIC BLOOD PRESSURE: 64 MMHG | HEIGHT: 71 IN | WEIGHT: 230.4 LBS | SYSTOLIC BLOOD PRESSURE: 110 MMHG

## 2021-03-17 DIAGNOSIS — I25.10 CORONARY ARTERY DISEASE INVOLVING NATIVE CORONARY ARTERY OF NATIVE HEART WITHOUT ANGINA PECTORIS: Primary | ICD-10-CM

## 2021-03-17 PROCEDURE — G8484 FLU IMMUNIZE NO ADMIN: HCPCS | Performed by: NURSE PRACTITIONER

## 2021-03-17 PROCEDURE — 1036F TOBACCO NON-USER: CPT | Performed by: NURSE PRACTITIONER

## 2021-03-17 PROCEDURE — 93000 ELECTROCARDIOGRAM COMPLETE: CPT | Performed by: INTERNAL MEDICINE

## 2021-03-17 PROCEDURE — G8427 DOCREV CUR MEDS BY ELIG CLIN: HCPCS | Performed by: NURSE PRACTITIONER

## 2021-03-17 PROCEDURE — G8417 CALC BMI ABV UP PARAM F/U: HCPCS | Performed by: NURSE PRACTITIONER

## 2021-03-17 PROCEDURE — 3017F COLORECTAL CA SCREEN DOC REV: CPT | Performed by: NURSE PRACTITIONER

## 2021-03-17 PROCEDURE — 99213 OFFICE O/P EST LOW 20 MIN: CPT | Performed by: NURSE PRACTITIONER

## 2021-03-17 RX ORDER — ASPIRIN 81 MG/1
81 TABLET, CHEWABLE ORAL DAILY
Qty: 30 TABLET | Refills: 5 | Status: SHIPPED | OUTPATIENT
Start: 2021-03-17

## 2021-03-17 RX ORDER — ARIPIPRAZOLE 5 MG/1
5 TABLET ORAL NIGHTLY
COMMUNITY

## 2021-03-17 RX ORDER — CARVEDILOL 25 MG/1
25 TABLET ORAL 2 TIMES DAILY WITH MEALS
Qty: 60 TABLET | Refills: 5 | Status: SHIPPED | OUTPATIENT
Start: 2021-03-17

## 2021-03-17 RX ORDER — TRAZODONE HYDROCHLORIDE 100 MG/1
100 TABLET ORAL NIGHTLY
COMMUNITY

## 2021-03-17 RX ORDER — PANTOPRAZOLE SODIUM 40 MG/1
40 GRANULE, DELAYED RELEASE ORAL
COMMUNITY

## 2021-03-17 RX ORDER — LEVOCETIRIZINE DIHYDROCHLORIDE 5 MG/1
5 TABLET, FILM COATED ORAL DAILY
COMMUNITY

## 2021-03-17 RX ORDER — OXCARBAZEPINE 300 MG/1
300 TABLET, FILM COATED ORAL 2 TIMES DAILY
COMMUNITY

## 2021-03-17 RX ORDER — IBUPROFEN 800 MG/1
800 TABLET ORAL 2 TIMES DAILY
COMMUNITY

## 2021-03-17 RX ORDER — HYDROXYZINE HYDROCHLORIDE 25 MG/1
25 TABLET, FILM COATED ORAL 4 TIMES DAILY PRN
COMMUNITY

## 2021-03-17 RX ORDER — PRASUGREL 10 MG/1
TABLET, FILM COATED ORAL
Qty: 30 TABLET | Refills: 5 | Status: SHIPPED | OUTPATIENT
Start: 2021-03-17

## 2021-03-17 RX ORDER — AMLODIPINE BESYLATE 10 MG/1
10 TABLET ORAL DAILY
COMMUNITY
End: 2021-03-17 | Stop reason: SDUPTHER

## 2021-03-17 RX ORDER — AMLODIPINE BESYLATE 10 MG/1
10 TABLET ORAL DAILY
Qty: 30 TABLET | Refills: 5 | Status: SHIPPED | OUTPATIENT
Start: 2021-03-17

## 2021-03-17 RX ORDER — ARIPIPRAZOLE 10 MG/1
10 TABLET ORAL EVERY MORNING
COMMUNITY

## 2021-03-17 RX ORDER — ISOSORBIDE MONONITRATE 30 MG/1
30 TABLET, EXTENDED RELEASE ORAL DAILY
Qty: 30 TABLET | Refills: 5 | Status: SHIPPED | OUTPATIENT
Start: 2021-03-17

## 2021-03-17 RX ORDER — PRAVASTATIN SODIUM 80 MG/1
80 TABLET ORAL NIGHTLY
Qty: 30 TABLET | Refills: 5 | Status: SHIPPED | OUTPATIENT
Start: 2021-03-17

## 2021-03-17 SDOH — HEALTH STABILITY: MENTAL HEALTH: HOW OFTEN DO YOU HAVE A DRINK CONTAINING ALCOHOL?: NOT ASKED

## 2021-03-19 ENCOUNTER — TELEPHONE (OUTPATIENT)
Dept: CARDIOLOGY CLINIC | Age: 56
End: 2021-03-19

## 2021-03-19 NOTE — TELEPHONE ENCOUNTER
Pt called and is having teeth removed and wondering if he can stop using his blood thinner when he has it done in two weeks? He is faxing a request as well that will need signed .  He can be reached at [phone]

## 2022-01-03 ENCOUNTER — HOSPITAL ENCOUNTER (EMERGENCY)
Age: 57
Discharge: HOME | End: 2022-01-04
Attending: EMERGENCY MEDICINE
Payer: COMMERCIAL

## 2022-01-03 ENCOUNTER — APPOINTMENT (OUTPATIENT)
Dept: CT IMAGING | Age: 57
End: 2022-01-03
Payer: COMMERCIAL

## 2022-01-03 VITALS
BODY MASS INDEX: 33.47 KG/M2 | SYSTOLIC BLOOD PRESSURE: 152 MMHG | TEMPERATURE: 98.3 F | RESPIRATION RATE: 16 BRPM | HEART RATE: 64 BPM | OXYGEN SATURATION: 96 % | WEIGHT: 240 LBS | DIASTOLIC BLOOD PRESSURE: 91 MMHG

## 2022-01-03 DIAGNOSIS — K80.80 BILIARY CALCULUS OF OTHER SITE WITHOUT OBSTRUCTION: ICD-10-CM

## 2022-01-03 DIAGNOSIS — R07.81 RIB PAIN: Primary | ICD-10-CM

## 2022-01-03 DIAGNOSIS — I70.0 THORACIC AORTA ATHEROSCLEROSIS (HCC): ICD-10-CM

## 2022-01-03 LAB
ALBUMIN SERPL-MCNC: 3.9 G/DL (ref 3.5–5.2)
ALP BLD-CCNC: 112 U/L (ref 40–129)
ALT SERPL-CCNC: 50 U/L (ref 0–40)
ANION GAP SERPL CALCULATED.3IONS-SCNC: 12 MMOL/L (ref 7–16)
AST SERPL-CCNC: 23 U/L (ref 0–39)
BASOPHILS ABSOLUTE: 0.04 E9/L (ref 0–0.2)
BASOPHILS RELATIVE PERCENT: 0.6 % (ref 0–2)
BILIRUB SERPL-MCNC: 0.3 MG/DL (ref 0–1.2)
BUN BLDV-MCNC: 12 MG/DL (ref 6–20)
CALCIUM SERPL-MCNC: 9.1 MG/DL (ref 8.6–10.2)
CHLORIDE BLD-SCNC: 104 MMOL/L (ref 98–107)
CO2: 22 MMOL/L (ref 22–29)
CREAT SERPL-MCNC: 0.8 MG/DL (ref 0.7–1.2)
EOSINOPHILS ABSOLUTE: 0.12 E9/L (ref 0.05–0.5)
EOSINOPHILS RELATIVE PERCENT: 1.8 % (ref 0–6)
GFR AFRICAN AMERICAN: >60
GFR NON-AFRICAN AMERICAN: >60 ML/MIN/1.73
GLUCOSE BLD-MCNC: 287 MG/DL (ref 74–99)
HCT VFR BLD CALC: 41.5 % (ref 37–54)
HEMOGLOBIN: 13.9 G/DL (ref 12.5–16.5)
IMMATURE GRANULOCYTES #: 0.04 E9/L
IMMATURE GRANULOCYTES %: 0.6 % (ref 0–5)
LYMPHOCYTES ABSOLUTE: 1.57 E9/L (ref 1.5–4)
LYMPHOCYTES RELATIVE PERCENT: 23.9 % (ref 20–42)
MCH RBC QN AUTO: 30 PG (ref 26–35)
MCHC RBC AUTO-ENTMCNC: 33.5 % (ref 32–34.5)
MCV RBC AUTO: 89.6 FL (ref 80–99.9)
MONOCYTES ABSOLUTE: 0.59 E9/L (ref 0.1–0.95)
MONOCYTES RELATIVE PERCENT: 9 % (ref 2–12)
NEUTROPHILS ABSOLUTE: 4.21 E9/L (ref 1.8–7.3)
NEUTROPHILS RELATIVE PERCENT: 64.1 % (ref 43–80)
PDW BLD-RTO: 13.8 FL (ref 11.5–15)
PLATELET # BLD: 199 E9/L (ref 130–450)
PMV BLD AUTO: 10.1 FL (ref 7–12)
POTASSIUM REFLEX MAGNESIUM: 4.1 MMOL/L (ref 3.5–5)
PRO-BNP: 73 PG/ML (ref 0–125)
RBC # BLD: 4.63 E12/L (ref 3.8–5.8)
SODIUM BLD-SCNC: 138 MMOL/L (ref 132–146)
TOTAL PROTEIN: 6.9 G/DL (ref 6.4–8.3)
TROPONIN, HIGH SENSITIVITY: 6 NG/L (ref 0–11)
WBC # BLD: 6.6 E9/L (ref 4.5–11.5)

## 2022-01-03 PROCEDURE — 84484 ASSAY OF TROPONIN QUANT: CPT

## 2022-01-03 PROCEDURE — 96375 TX/PRO/DX INJ NEW DRUG ADDON: CPT

## 2022-01-03 PROCEDURE — 36415 COLL VENOUS BLD VENIPUNCTURE: CPT

## 2022-01-03 PROCEDURE — 85025 COMPLETE CBC W/AUTO DIFF WBC: CPT

## 2022-01-03 PROCEDURE — 6360000002 HC RX W HCPCS: Performed by: STUDENT IN AN ORGANIZED HEALTH CARE EDUCATION/TRAINING PROGRAM

## 2022-01-03 PROCEDURE — 93005 ELECTROCARDIOGRAM TRACING: CPT | Performed by: STUDENT IN AN ORGANIZED HEALTH CARE EDUCATION/TRAINING PROGRAM

## 2022-01-03 PROCEDURE — 6360000004 HC RX CONTRAST MEDICATION: Performed by: RADIOLOGY

## 2022-01-03 PROCEDURE — 74174 CTA ABD&PLVS W/CONTRAST: CPT

## 2022-01-03 PROCEDURE — 99284 EMERGENCY DEPT VISIT MOD MDM: CPT

## 2022-01-03 PROCEDURE — 96374 THER/PROPH/DIAG INJ IV PUSH: CPT

## 2022-01-03 PROCEDURE — 83880 ASSAY OF NATRIURETIC PEPTIDE: CPT

## 2022-01-03 PROCEDURE — 80053 COMPREHEN METABOLIC PANEL: CPT

## 2022-01-03 PROCEDURE — 71275 CT ANGIOGRAPHY CHEST: CPT

## 2022-01-03 RX ORDER — FENTANYL CITRATE 50 UG/ML
50 INJECTION, SOLUTION INTRAMUSCULAR; INTRAVENOUS ONCE
Status: COMPLETED | OUTPATIENT
Start: 2022-01-03 | End: 2022-01-03

## 2022-01-03 RX ORDER — KETOROLAC TROMETHAMINE 30 MG/ML
15 INJECTION, SOLUTION INTRAMUSCULAR; INTRAVENOUS ONCE
Status: COMPLETED | OUTPATIENT
Start: 2022-01-03 | End: 2022-01-03

## 2022-01-03 RX ORDER — KETOROLAC TROMETHAMINE 10 MG/1
10 TABLET, FILM COATED ORAL EVERY 6 HOURS PRN
Qty: 20 TABLET | Refills: 0 | Status: SHIPPED | OUTPATIENT
Start: 2022-01-03

## 2022-01-03 RX ADMIN — FENTANYL CITRATE 50 MCG: 50 INJECTION INTRAMUSCULAR; INTRAVENOUS at 22:48

## 2022-01-03 RX ADMIN — KETOROLAC TROMETHAMINE 15 MG: 30 INJECTION, SOLUTION INTRAMUSCULAR; INTRAVENOUS at 20:59

## 2022-01-03 RX ADMIN — IOPAMIDOL 90 ML: 755 INJECTION, SOLUTION INTRAVENOUS at 19:00

## 2022-01-03 ASSESSMENT — PAIN SCALES - GENERAL
PAINLEVEL_OUTOF10: 8

## 2022-01-03 ASSESSMENT — ENCOUNTER SYMPTOMS
DIARRHEA: 0
SINUS PAIN: 0
SHORTNESS OF BREATH: 0
VOMITING: 0
BACK PAIN: 0
NAUSEA: 0
RHINORRHEA: 0
SORE THROAT: 0
CHEST TIGHTNESS: 0
ABDOMINAL PAIN: 0
COUGH: 0
EYE PAIN: 0

## 2022-01-03 NOTE — ED NOTES
Bed:   Expected date:   Expected time:   Means of arrival:   Comments:  Bang Reddy, GRACIELA  01/03/22 9905

## 2022-01-03 NOTE — PROGRESS NOTES
ekg done and shown to Dr Chris Argueta, residents to assess. Pt states took 3 nitro at home and wants to take another now, ems gave pt 324mg aspirin en route.

## 2022-01-04 LAB
EKG ATRIAL RATE: 73 BPM
EKG P AXIS: 64 DEGREES
EKG P-R INTERVAL: 178 MS
EKG Q-T INTERVAL: 428 MS
EKG QRS DURATION: 106 MS
EKG QTC CALCULATION (BAZETT): 471 MS
EKG R AXIS: -14 DEGREES
EKG T AXIS: 45 DEGREES
EKG VENTRICULAR RATE: 73 BPM

## 2022-01-04 NOTE — ED PROVIDER NOTES
58-year-old male presents today to the emergency department with complaints of left-sided rib pain status post fall that occurred 4 days ago. Symptoms are described as a sharp ache that is worse with movement and better with rest.  His symptoms are moderate in severity and have been progressively getting worse. Patient is worried that he may have fractured a rib. He denies any other pain, trauma or injuries. He denies any associated shortness of breath. He states that the pain was initially localized to the left side of his chest and then moved across his chest and down his abdomen. He states that he had an MI last year and has 1 stent placed in his heart. His symptoms do not feel like his previous MI. He did take nitroglycerin at home with relief of symptoms. He denies any associated dizziness, lightheadedness. His fall was purely mechanical in nature. Review of Systems   Constitutional: Negative for diaphoresis and fever. HENT: Negative for ear pain, rhinorrhea, sinus pain and sore throat. Eyes: Negative for pain. Respiratory: Negative for cough, chest tightness and shortness of breath. Cardiovascular: Positive for chest pain. Negative for leg swelling. Gastrointestinal: Negative for abdominal pain, diarrhea, nausea and vomiting. Genitourinary: Negative for dysuria, flank pain and frequency. Musculoskeletal: Negative for back pain, neck pain and neck stiffness. Neurological: Negative for dizziness, weakness, light-headedness and headaches. Psychiatric/Behavioral: Negative for agitation and confusion. Physical Exam  Constitutional:       General: He is not in acute distress. Appearance: He is well-developed. He is not toxic-appearing or diaphoretic. HENT:      Head: Normocephalic and atraumatic. Right Ear: Hearing and external ear normal.      Left Ear: Hearing and external ear normal.      Nose: Nose normal.   Eyes:      General: No scleral icterus. Conjunctiva/sclera: Conjunctivae normal.   Cardiovascular:      Rate and Rhythm: Normal rate and regular rhythm. Heart sounds: Normal heart sounds, S1 normal and S2 normal.      Comments: Mild tenderness to palpation over the left lateral chest wall  Pulmonary:      Effort: Pulmonary effort is normal. No respiratory distress. Breath sounds: Normal breath sounds. No stridor. No decreased breath sounds, wheezing, rhonchi or rales. Chest:      Chest wall: No tenderness. Abdominal:      Palpations: Abdomen is soft. Abdomen is not rigid. Tenderness: There is no abdominal tenderness. There is no guarding or rebound. Musculoskeletal:         General: Normal range of motion. Cervical back: Normal range of motion. Skin:     General: Skin is warm and dry. Neurological:      General: No focal deficit present. Mental Status: He is alert and oriented to person, place, and time. GCS: GCS eye subscore is 4. GCS verbal subscore is 5. GCS motor subscore is 6. Psychiatric:         Mood and Affect: Mood normal.         Behavior: Behavior normal.          Procedures     MDM  Number of Diagnoses or Management Options  Biliary calculus of other site without obstruction  Rib pain  Thoracic aorta atherosclerosis ([facility] [address]  Diagnosis management comments: This is a 68-year-old male who presents today to the emergency department status post fall 4 days ago with complaints of left-sided rib pain. On arrival to the emergency department he is nontoxic, no acute distress. Vital signs are reviewed and within normal limits. On exam patient has tenderness to palpation over the left ribs. Otherwise normal exam.  Lungs are clear to auscultation. Work-up was ordered including an EKG which showed sinus rhythm with no ST elevations. High-sensitivity troponin is within normal limits. No concern for ACS at this time.   CBC, CMP and BNP are normal.  CTA of the chest, abdomen pelvis Show no evidence of aneurysm or dissection. Patient does have mild atherosclerosis in the abdominal aorta. He also has thickening in the distal esophagus which is concerning for esophagitis. Also has cholelithiasis and diverticulosis without diverticulitis. All these findings are noncontributory and are incidental.  Discussed findings with the patient and informed him that his symptoms are likely secondary to contusion. Recommended supportive care at home. He was prescribed Toradol for pain control. Patient is stable for discharge today from the ED. Follow-up was discussed with PCP. Return precautions were given.                 --------------------------------------------- PAST HISTORY ---------------------------------------------  Past Medical History:  has a past medical history of Anxiety, Bipolar 1 disorder ([facility] [address] CAD (coronary artery disease), Closed fracture of right calcaneus, Depression, Encounter for screening colonoscopy, Hyperlipidemia, Hypertension, Post traumatic stress disorder (PTSD), Schizo affective schizophrenia ([facility] [address] Smoker unmotivated to quit, and Tracheostomy in place [facility]). Past Surgical History:  has a past surgical history that includes Colonoscopy (1/21/16); pr apply bone multiplan,ext fix dev (Left, 4/7/2018); open tx tibial fracture proximal unicondylar (Left, 4/12/2018); open treatment clavicular fracture internal fx (Left, 4/12/2018); Neck surgery (04/16/2018); pr airways surgical procedure unlisted (N/A, 4/17/2018); pr bronchoscopy w/ther aspir trachbrncl tree 1st (4/17/2018); open tx trimalleolar ankle fx w fix pst lip (Right, 4/19/2018); fracture surgery; and Coronary angioplasty with stent (12/14/2020). Social History:  reports that he quit smoking about 3 years ago. He started smoking about 41 years ago. He smoked 1.00 pack per day. He has never used smokeless tobacco. He reports previous alcohol use. He reports that he does not use drugs.     Family History: family history includes Cancer in his pg/mL       Radiology:  CTA CHEST W CONTRAST   Final Result   CT ANGIOGRAPHY OF THE CHEST, ABDOMEN AND PELVIS:      1. No evidence of aortic aneurysm, dissection or rupture. 2. Mild atherosclerosis in the thoracic aorta. Moderate atherosclerosis in   the abdominal aorta. CT OF THE CHEST, ABDOMEN AND PELVIS (NON VASCULAR):      1. Mild apparent circumferential thickening of the distal esophagus which may   esophagitis. 2. No acute abnormality seen abdomen or the pelvis. 3. Stable tiny enhancing focus in the liver likely benign (hemangioma or FNH). 4. Cholelithiasis. 5. Prominent distal colonic diverticulosis without diverticulitis. RECOMMENDATIONS:   Unavailable         CTA ABDOMEN PELVIS W CONTRAST   Final Result   CT ANGIOGRAPHY OF THE CHEST, ABDOMEN AND PELVIS:      1. No evidence of aortic aneurysm, dissection or rupture. 2. Mild atherosclerosis in the thoracic aorta. Moderate atherosclerosis in   the abdominal aorta. CT OF THE CHEST, ABDOMEN AND PELVIS (NON VASCULAR):      1. Mild apparent circumferential thickening of the distal esophagus which may   esophagitis. 2. No acute abnormality seen abdomen or the pelvis. 3. Stable tiny enhancing focus in the liver likely benign (hemangioma or FNH). 4. Cholelithiasis. 5. Prominent distal colonic diverticulosis without diverticulitis. RECOMMENDATIONS:   Unavailable             . EKG: This EKG is signed and interpreted by me. Normal sinus rhythm rate of 73, DC interval 178 MS,  MS, QTc 471 MS, no ST segment elevation or depression  Comparison: stable as compared to patient's most recent EKG    ------------------------- NURSING NOTES AND VITALS REVIEWED ---------------------------  Date / Time Roomed:  1/3/2022  4:54 PM  ED Bed Assignment:  [place] F/    The nursing notes within the ED encounter and vital signs as below have been reviewed.    BP (!) 152/91   Pulse 64   Temp 98.3 °F (36.8 °C) (Oral)   Resp 16   Wt 240 lb (108.9 kg)   SpO2 96%   BMI 33.47 kg/m²   Oxygen Saturation Interpretation: Normal      ------------------------------------------ PROGRESS NOTES ------------------------------------------  12:29 AM EST  I have spoken with the patient and discussed todays results, in addition to providing specific details for the plan of care and counseling regarding the diagnosis and prognosis. Their questions are answered at this time and they are agreeable with the plan. I discussed at length with them reasons for immediate return here for re evaluation. They will followup with their primary care physician by calling their office tomorrow. --------------------------------- ADDITIONAL PROVIDER NOTES ---------------------------------  At this time the patient is without objective evidence of an acute process requiring hospitalization or inpatient management. They have remained hemodynamically stable throughout their entire ED visit and are stable for discharge with outpatient follow-up. The plan has been discussed in detail and they are aware of the specific conditions for emergent return, as well as the importance of follow-up. Discharge Medication List as of 1/3/2022  8:34 PM      START taking these medications    Details   ketorolac (TORADOL) 10 MG tablet Take 1 tablet by mouth every 6 hours as needed for Pain (Pt recieved IV dose in the ED), Disp-20 tablet, R-0Print             Diagnosis:  1. Rib pain    2. Biliary calculus of other site without obstruction    3. Thoracic aorta atherosclerosis ([address]        Disposition:  Patient's disposition: Discharge to home  Patient's condition is stable. Rubina Hernández DO  Resident  01/04/22 9976     ATTENDING PROVIDER ATTESTATION:     I,  Dr. Lucas Shone am the primary physician of record for this patient.     Sanford Carmona presented to the emergency department for evaluation of Chest Pain (cp x 2 days hx MI w stent 1 year ago, ) and Fall (fell on New years elise c/o left rib pain )   and was initially evaluated by the Medical Resident. See Original ED Note for H&P and ED course above. I have reviewed and discussed the case, including pertinent history (medical, surgical, family and social) and exam findings with the Medical Resident assigned to Ariane Oneill. I have personally performed and/or participated in the history, exam, medical decision making, and procedures and agree with all pertinent clinical information. I have reviewed my findings and recommendations with the assigned Medical Resident, Ariane Oneill and members of family present at the time of disposition. My findings/plan: The primary encounter diagnosis was Rib pain. Diagnoses of Biliary calculus of other site without obstruction and Thoracic aorta atherosclerosis ([facility] [address] were also pertinent to this visit.   Discharge Medication List as of 1/3/2022  8:34 PM      START taking these medications    Details   ketorolac (TORADOL) 10 MG tablet Take 1 tablet by mouth every 6 hours as needed for Pain (Pt recieved IV dose in the ED), Disp-20 tablet, R-0Print           Karthikeyan Luna, [address] [place] [address],   01/06/22 [id number]

## 2022-01-04 NOTE — ED NOTES
Pt states he is feeling much better after being medicated.       Andrés Stallings, GRACIELA  01/04/22 0906

## (undated) DEVICE — SET ORTHO STD STORTSTD1

## (undated) DEVICE — DRAPE,REIN 53X77,STERILE: Brand: MEDLINE

## (undated) DEVICE — BANDAGE GZ W45INXL4 1 10YD FLUF RL 6 PLY DERMACEA

## (undated) DEVICE — TUBING SUCT 12FR MAL ALUM SHFT FN CAP VENT UNIV CONN W/ OBT

## (undated) DEVICE — PADDING,UNDERCAST,COTTON, 4"X4YD STERILE: Brand: MEDLINE

## (undated) DEVICE — 4-PORT MANIFOLD: Brand: NEPTUNE 2

## (undated) DEVICE — Device

## (undated) DEVICE — PADDING CAST W6INXL4YD COT LO LINTING WYTEX

## (undated) DEVICE — GOWN,AURORA,BRTHSLV,2XL,18/CS: Brand: MEDLINE

## (undated) DEVICE — Z DISCONTINUED USE 2744636  DRESSING AQUACEL 14 IN ALG W3.5XL14IN POLYUR FLM CVR W/ HYDRCOLL

## (undated) DEVICE — Z DISCONTINUED PER MEDLINE USE 2741943 DRESSING AQUACEL 10 IN ALG W9XL25CM SIL CVR WTRPRF VIR BACT BARR ANTIMIC

## (undated) DEVICE — DISCONTINUED NO SUB IDED TG GLOVE SURG SENSICARE ALOE LT LF PF ST GRN SZ 8

## (undated) DEVICE — AGENT HEMSTAT W4XL8IN OXIDIZED REGENERATED CELOS ABSRB

## (undated) DEVICE — Z CONVERTED USE 2275207 CLOTH PREP W7.5XL7.5IN 2% CHG SKIN ALC AND RNS FREE

## (undated) DEVICE — BIT DRL L145MM DIA3.2MM ST QUIK CPL W/O STP REUSE

## (undated) DEVICE — STANDARD HYPODERMIC NEEDLE,POLYPROPYLENE HUB: Brand: MONOJECT

## (undated) DEVICE — ELECTRODE PT RET AD L9FT HI MOIST COND ADH HYDRGEL CORDED

## (undated) DEVICE — SPONGE,LAP,4"X18",XR,ST,5/PK,40PK/CS: Brand: MEDLINE INDUSTRIES, INC.

## (undated) DEVICE — HOLDER TRACH TB W1IN FIT UPTO 19.5IN NK 2 PC ADJ BLU

## (undated) DEVICE — TOTAL KNEE PK

## (undated) DEVICE — Z INACTIVE USE 2660664 SOLUTION IRRIG 3000ML 0.9% SOD CHL USP UROMATIC PLAS CONT

## (undated) DEVICE — Z DISCONTINUED BY MEDLINE USE 2280062 TUBE TRACH SZ 8 L79MM OD12.2MM ID7.6MM CUF DISP INNR CANN

## (undated) DEVICE — BIT DRL 3 FLUT 2.7X125 MM QC SS STRL LCP

## (undated) DEVICE — Z DISCONTINUED PER MEDLINE USE 2741942 DRESSING AQUACEL 6 IN ALG W9XL15CM SIL CVR WTRPRF VIR BACT BARR ANTIMIC

## (undated) DEVICE — BANDAGE COBAN 4 IN COMPR W4INXL5YD FOAM COHESIVE QUIK STK SELF ADH SFT

## (undated) DEVICE — SET SURG INSTR MINI VASC

## (undated) DEVICE — INTENDED FOR TISSUE SEPARATION, AND OTHER PROCEDURES THAT REQUIRE A SHARP SURGICAL BLADE TO PUNCTURE OR CUT.: Brand: BARD-PARKER ® STAINLESS STEEL BLADES

## (undated) DEVICE — STRIP,CLOSURE,WOUND,MEDI-STRIP,1/4X3: Brand: MEDLINE

## (undated) DEVICE — GAUZE,PACKING STRIP,PLAIN,1/2"X5YD,STRL: Brand: CURAD

## (undated) DEVICE — RACK TUBE CURETTE

## (undated) DEVICE — TOWEL,OR,DSP,ST,BLUE,DLX,10/PK,8PK/CS: Brand: MEDLINE

## (undated) DEVICE — CHLORAPREP 26ML ORANGE

## (undated) DEVICE — SHEET,DRAPE,53X77,STERILE: Brand: MEDLINE

## (undated) DEVICE — CONVERTORS STOCKINETTE: Brand: CONVERTORS

## (undated) DEVICE — BIT DRL L110MM DIA2.5MM ST G QUIK CPL NONRADIOPAQUE W/O STP

## (undated) DEVICE — DRAPE C ARM W41XL74IN UNIV MOB W RUBBERBAND CLP

## (undated) DEVICE — BANDAGE COMPR W6INXL10YD ST M E WHITE/BEIGE

## (undated) DEVICE — SCREW FIX L200MM DIA5MM THRD L80MM S STL SELF DRL SCHNZ
Type: IMPLANTABLE DEVICE | Site: ANKLE | Status: NON-FUNCTIONAL
Removed: 2018-04-19

## (undated) DEVICE — DRIP REDUCTION MANIFOLD

## (undated) DEVICE — 3M™ IOBAN™ 2 ANTIMICROBIAL INCISE DRAPE 6650EZ: Brand: IOBAN™ 2

## (undated) DEVICE — SPLINT ORTH W5INXL2.5FT PLSTR UNPD PRECUT FAST CRM

## (undated) DEVICE — BNDG,ELSTC,MATRIX,STRL,6"X5YD,LF,HOOK&LP: Brand: MEDLINE

## (undated) DEVICE — GLOVE ORANGE PI 7   MSG9070

## (undated) DEVICE — CONTROL SYRINGE LUER-LOCK TIP: Brand: MONOJECT

## (undated) DEVICE — CLAMP EXT FIX L TI ALLY COMB CLP ON SELF HLD

## (undated) DEVICE — GAUZE,SPONGE,AVANT,4"X4",4PLY,STRL,10/TR: Brand: MEDLINE

## (undated) DEVICE — ZIMMER® STERILE DISPOSABLE TOURNIQUET CUFF WITH PROTECTIVE SLEEVE AND PLC, DUAL PORT, SINGLE BLADDER, 34 IN. (86 CM)

## (undated) DEVICE — PACK,SHOULDER SPLIT: Brand: MEDLINE

## (undated) DEVICE — BANDAGE COMPR W6INXL12FT SMOOTH FOR LIMB EXSANG ESMARCH

## (undated) DEVICE — SET ORTHO STD STORTSTD2

## (undated) DEVICE — POST EXT FIX DIA11MM 90DEG OUTRIG MAG RESONANCE CONDITIONAL

## (undated) DEVICE — HANDPIECE SET WITH BONE CLEANING TIP AND SUCTION TUBE: Brand: INTERPULSE

## (undated) DEVICE — Z DISCONTINUED USE 2275686 GLOVE SURG SZ 8 L12IN FNGR THK13MIL WHT ISOLEX POLYISOPRENE

## (undated) DEVICE — CLAMP EXT FIX L PIN 6 POS MAG RESONANCE CONDITIONAL

## (undated) DEVICE — 1000 S-DRAPE TOWEL DRAPE 10/BX: Brand: STERI-DRAPE™

## (undated) DEVICE — CATHETER,SUCTION,18FR,WHISTL,STR PK,CAL: Brand: MEDLINE

## (undated) DEVICE — MAGNETIC DRAPE WITH STAYS,ONE NEUTRAL ZONE: Brand: DEVON

## (undated) DEVICE — GOWN,BREATHABLE SLV,AURORA,XLG,STRL: Brand: MEDLINE

## (undated) DEVICE — GLOVE SURG SZ 65 THK91MIL LTX FREE SYN POLYISOPRENE

## (undated) DEVICE — BIT DRL L140MM DIA2MM QUIK CPL 3 FLUT CALIB DEPTH MRK W/O

## (undated) DEVICE — SOLUTION IV IRRIG WATER 1000ML POUR BRL 2F7114

## (undated) DEVICE — BANDAGE COMPR W4INXL10YD WHITE/BEIGE E MTRX HK LOOP CLSR

## (undated) DEVICE — DRESSING,GAUZE,XEROFORM,CURAD,5"X9",ST: Brand: CURAD

## (undated) DEVICE — TOWEL,OR,DSP,ST,BLUE,STD,6/PK,12PK/CS: Brand: MEDLINE

## (undated) DEVICE — 3M™ STERI-DRAPE™ U-DRAPE 1015: Brand: STERI-DRAPE™

## (undated) DEVICE — SYRINGE MED 20ML STD CLR PLAS LUERLOCK TIP N CTRL DISP

## (undated) DEVICE — BLADE CLIPPER GEN PURP NS

## (undated) DEVICE — SOLUTION IV IRRIG POUR BRL 0.9% SODIUM CHL 2F7124

## (undated) DEVICE — GUIDEWIRE ORTH DIA2.5MM LOK SMOOTH DRL TIP FLX THRD FOR

## (undated) DEVICE — GAUZE,SPONGE,4"X4",8PLY,STRL,LF,10/TRAY: Brand: MEDLINE

## (undated) DEVICE — DRILL SYSTEM 7

## (undated) DEVICE — SPONGE,DISSECTOR,ROUND CHERRY,XR,ST,5/PK: Brand: MEDLINE

## (undated) DEVICE — SYRINGE MED 10ML LUERLOCK TIP W/O SFTY DISP

## (undated) DEVICE — SET INSTR TRACH

## (undated) DEVICE — BIT DRL L180MM DIA4.3MM QUIK CPL W/O STP REUSE

## (undated) DEVICE — ELECTRODE NDL 2.8IN COAT VALLEYLAB

## (undated) DEVICE — Z DISCONTINUED PER MEDLINE USE 2741944 DRESSING AQUACEL 12 IN SURG W9XL30CM SIL CVR WTRPRF VIR BACT BARR ANTIMIC

## (undated) DEVICE — SET LAMBOTTE

## (undated) DEVICE — SURGICAL PROCEDURE PACK BASIC

## (undated) DEVICE — SURGICAL PROCEDURE PACK EENT CUST